# Patient Record
Sex: MALE | Race: WHITE
[De-identification: names, ages, dates, MRNs, and addresses within clinical notes are randomized per-mention and may not be internally consistent; named-entity substitution may affect disease eponyms.]

---

## 2022-02-19 ENCOUNTER — HOSPITAL ENCOUNTER (EMERGENCY)
Dept: HOSPITAL 97 - ER | Age: 37
Discharge: HOME | End: 2022-02-19
Payer: SELF-PAY

## 2022-02-19 VITALS — SYSTOLIC BLOOD PRESSURE: 136 MMHG | TEMPERATURE: 98.6 F | DIASTOLIC BLOOD PRESSURE: 89 MMHG | OXYGEN SATURATION: 100 %

## 2022-02-19 DIAGNOSIS — K02.7: ICD-10-CM

## 2022-02-19 DIAGNOSIS — F17.210: ICD-10-CM

## 2022-02-19 DIAGNOSIS — K05.00: Primary | ICD-10-CM

## 2022-02-19 PROCEDURE — 99283 EMERGENCY DEPT VISIT LOW MDM: CPT

## 2022-02-19 PROCEDURE — 96372 THER/PROPH/DIAG INJ SC/IM: CPT

## 2022-02-19 NOTE — ER
Nurse's Notes                                                                                     

 [facility]                                                                 

Name: Sohail Coughlin                                                                            

Age: 36 yrs                                                                                       

Sex: Male                                                                                         

: 1985                                                                                   

MRN: [id number]                                                                                   

Arrival Date: 2022                                                                          

Time: 09:49                                                                                       

Account#: [id number]                                                                            

Bed 15                                                                                            

Private MD:                                                                                       

Diagnosis: Acute gingivitis;Dental root caries                                                    

                                                                                                  

Presentation:                                                                                     

                                                                                             

10:02 Chief complaint: Patient states: C/O left bottom teeth pain x 3 days. Coronavirus       jl7 

      screen: At this time, the client does not indicate any symptoms associated with             

      coronavirus-19. Ebola Screen: No symptoms or risks identified at this time. Initial         

      Sepsis Screen: Does the patient meet any 2 criteria? No. Patient's initial sepsis           

      screen is negative. Does the patient have a suspected source of infection? No.              

      Patient's initial sepsis screen is negative. Risk Assessment: Do you want to hurt           

      yourself or someone else? Patient reports no desire to harm self or others. Onset of        

      symptoms was 2022.                                                             

10:02 Method Of Arrival: Ambulatory                                                           [facility] 

10:02 Acuity: ANUSHA 4                                                                           jl7 

                                                                                                  

Triage Assessment:                                                                                

10:06 General: Appears in no apparent distress. uncomfortable, Behavior is calm, cooperative, jl7 

      appropriate for age. Pain: Complains of pain in lower left third molar and lower left       

      cuspid. EENT: Reports pain in mouth.                                                        

                                                                                                  

Historical:                                                                                       

- Allergies:                                                                                      

10:06 No Known Allergies;                                                                     jl7 

- Home Meds:                                                                                      

10:06 None [Active];                                                                          jl7 

- PMHx:                                                                                           

10:06 None;                                                                                   jl7 

- PSHx:                                                                                           

10:06 None;                                                                                   jl7 

                                                                                                  

- Immunization history:: Client reports having NOT received the Covid vaccine.                    

- Social history:: Smoking status: Patient reports the use of cigarette tobacco                   

  products, smokes one-half pack cigarettes per day.                                              

- Family history:: not pertinent.                                                                 

- Hospitalizations: : No recent hospitalization is reported.                                      

                                                                                                  

                                                                                                  

Screening:                                                                                        

10:10 Abuse screen: Denies threats or abuse.                                                  6 

10:10 Nutritional screening: No deficits noted. Tuberculosis screening: No symptoms or risk   jh6 

      factors identified. Fall Risk None identified.                                              

                                                                                                  

Assessment:                                                                                       

10:10 General: Appears in no apparent distress. comfortable, Behavior is calm, cooperative.   jh6 

10:10 Pain: Complains of pain in right jaw Pain currently is 7 out of 10 on a pain scale.     jh6 

      Quality of pain is described as throbbing.                                                  

10:33 General: no reaction to med given. verbal understanding of dc instructions and meds. .  6 

                                                                                                  

Vital Signs:                                                                                      

10:02  / 100; Pulse 94; Resp 17; Temp 97.1(TE); Pulse Ox 98% on R/A; Weight 105.23 kg;  jl7 

      Height 6 ft. 1 in. (185.42 cm); Pain 9/10;                                                  

10:31  / 89; Pulse 86; Resp 17; Temp 98.6; Pulse Ox 100% ; Pain 7/10;                   jh6 

10:02 Body Mass Index 30.61 (105.23 kg, 185.42 cm)                                            7 

                                                                                                  

ED Course:                                                                                        

09:49 Patient arrived in ED.                                                                  as  

09:54 Conrado Allred MD is Attending Physician.                                                rn  

10:04 Marybeth Narayan, RN is Primary Nurse.                                                 6 

10:05 Triage completed.                                                                       7 

10:06 Arm band placed on right wrist.                                                         7 

10:06 Bed in low position. Call light in reach. Side rails up X 1.                            6 

10:32 No provider procedures requiring assistance completed. Patient did not have IV access   6 

      during this emergency room visit.                                                           

                                                                                                  

Administered Medications:                                                                         

10:08 Drug: Ketorolac 30 mg Route: IM; Site: right deltoid;                                   [facility] 

                                                                                                  

                                                                                                  

Outcome:                                                                                          

10:21 Discharge ordered by MD.                                                                rn  

10:32 Discharged to home ambulatory.                                                          6 

10:32 Condition: good                                                                             

10:32 Discharge instructions given to patient, Instructed on discharge instructions, follow       

      up and referral plans. Demonstrated understanding of instructions, follow-up care,          

      medications, Prescriptions given X 3.                                                       

10:36 Patient left the ED.                                                                    [facility] 

                                                                                                  

Signatures:                                                                                       

Nilda Escamilla                             as                                                   

Conrado Allred MD MD rn Leal, Jahala, RN RN   7                                                  

Marybeth Narayan RN                   RN   [facility]                                                  

                                                                                                  

**************************************************************************************************

## 2022-02-19 NOTE — EDPHYS
Physician Documentation                                                                           

 [facility]                                                                 

Name: Sohail Coughlin                                                                            

Age: 36 yrs                                                                                       

Sex: Male                                                                                         

: 1985                                                                                   

MRN: [id number]                                                                                   

Arrival Date: 2022                                                                          

Time: 09:49                                                                                       

Account#: [id number]                                                                            

Bed 15                                                                                            

Private MD:                                                                                       

ED Physician Conrado Allred                                                                         

HPI:                                                                                              

                                                                                             

10:04 This 36 yrs old Male presents to ER via [facility] with complaints of Toothache,        rn  

      Bleeding Gums.                                                                              

10:04 The patient presents with pain, redness. The problem is located in the Left upper and   rn  

      lower teeth. Onset: The symptoms/episode began/occurred 3 day(s) ago. Duration: The         

      symptoms are continuous. Modifying factors: The symptoms are alleviated by nothing, the     

      symptoms are aggravated by air, chewing, food. Severity of symptoms: At their worst the     

      symptoms were moderate, in the emergency department the symptoms are unchanged. The         

      patient has experienced similar episodes in the past, chronically. Reports has bad          

      teeth but worse over the last 3 days. Worse with air/fluid and food. Has not seen a         

      dentist. Has had this chronically but got worse recently. No difficulty swallowing or       

      breathing. No facial swelling.                                                              

                                                                                                  

Historical:                                                                                       

- Allergies:                                                                                      

10:06 No Known Allergies;                                                                     jl7 

- Home Meds:                                                                                      

10:06 None [Active];                                                                          jl7 

- PMHx:                                                                                           

10:06 None;                                                                                   jl7 

- PSHx:                                                                                           

10:06 None;                                                                                   jl7 

                                                                                                  

- Immunization history:: Client reports having NOT received the Covid vaccine.                    

- Social history:: Smoking status: Patient reports the use of cigarette tobacco                   

  products, smokes one-half pack cigarettes per day.                                              

- Family history:: not pertinent.                                                                 

- Hospitalizations: : No recent hospitalization is reported.                                      

                                                                                                  

                                                                                                  

ROS:                                                                                              

10:04 Constitutional: Negative for fever, chills, and weight loss, Eyes: Negative for injury, rn  

      pain, redness, and discharge, ENT: Positive for dental pain and caries                      

                                                                                                  

Exam:                                                                                             

10:20 Constitutional:  This is a well developed, well nourished patient who is awake, alert,  rn  

      and in no acute distress. ENT:  Poor dentition with inflamed gingiva, no sign of            

      intraoral abscess or drainage Neck:  Trachea midline, no masses palpated, and no            

      cervical lymphadenopathy.  Supple, full range of motion without nuchal rigidity, or         

      vertebral point tenderness.  No Meningismus.                                                

                                                                                                  

Vital Signs:                                                                                      

10:02  / 100; Pulse 94; Resp 17; Temp 97.1(TE); Pulse Ox 98% on R/A; Weight 105.23 kg;  jl7 

      Height 6 ft. 1 in. (185.42 cm); Pain 9/10;                                                  

10:31  / 89; Pulse 86; Resp 17; Temp 98.6; Pulse Ox 100% ; Pain 7/10;                   jh6 

10:02 Body Mass Index 30.61 (105.23 kg, 185.42 cm)                                            7 

                                                                                                  

MDM:                                                                                              

09:54 Patient medically screened.                                                             rn  

10:20 Differential diagnosis: dental caries, gingivitis, dental abscess. Data reviewed: vital rn  

      signs, nurses notes, and as a result, I will discharge patient. Counseling: I had a         

      detailed discussion with the patient and/or guardian regarding: the historical points,      

      exam findings, and any diagnostic results supporting the discharge/admit diagnosis, the     

      need for outpatient follow up, to return to the emergency department if symptoms worsen     

      or persist or if there are any questions or concerns that arise at home. Response to        

      treatment: the patient's symptoms have mildly improved after treatment, and as a            

      result, I will discharge patient. Special discussion: I discussed with the                  

      patient/guardian in detail that at this point there is no indication for admission to       

      the hospital. It is understood, however, that if the symptoms persist or worsen the         

      patient needs to return immediately for re-evaluation. Based on the history and exam        

      findings, there is no indication for further emergent testing or inpatient evaluation.      

      I discussed with the patient/guardian the need to see a dentist for further evaluation      

      of the symptoms.                                                                            

                                                                                                  

Administered Medications:                                                                         

10:08 Drug: Ketorolac 30 mg Route: IM; Site: right deltoid;                                   [facility] 

                                                                                                  

                                                                                                  

Disposition Summary:                                                                              

22 10:21                                                                                    

Discharge Ordered                                                                                 

      Location: Home                                                                          rn  

      Problem: chronic                                                                        rn  

      Symptoms: have improved                                                                 rn  

      Condition: Stable                                                                       rn  

      Diagnosis                                                                                   

        - Acute gingivitis                                                                    rn  

        - Dental root caries                                                                  rn  

      Followup:                                                                               rn  

        - With: Private Physician                                                                  

        - When: As needed                                                                          

        - Reason: Recheck today's complaints, Re-evaluation by your physician                      

      Discharge Instructions:                                                                     

        - Discharge Summary Sheet                                                             rn  

        - Dental Caries, Adult                                                                rn  

        - Dental Pain                                                                         rn  

      Forms:                                                                                      

        - Medication Reconciliation Form                                                      rn  

        - Thank You Letter                                                                    rn  

        - Antibiotic Education                                                                rn  

        - Prescription Opioid Use                                                             rn  

        - Work release form                                                                   eb  

      Prescriptions:                                                                              

        - Augmentin 875-125 mg Oral Tablet                                                         

            - take 1 tablet by ORAL route every 12 hours for 10 days; 20 tablet; Refills: 0,  rn  

      Product Selection Permitted                                                                 

        - Tramadol 50 mg Oral Tablet                                                               

            - take 1 tablet by ORAL route every 8 hours as needed; 12 tablet; Refills: 0,     rn  

      Product Selection Permitted                                                                 

        - Medrol (Kalin) 4 mg Oral Tablets, Dose Pack                                                

            - take 1 tablet by ORAL route as directed - follow package instructions; 1        rn  

      packet; Refills: 0, Product Selection Permitted                                             

Signatures:                                                                                       

Conrado Allred MD MD rn Leal, Jahala RN                        RN   jl7                                                  

Marybeth Narayan RN                   RN   jh6                                                  

                                                                                                  

**************************************************************************************************

## 2022-03-25 ENCOUNTER — HOSPITAL ENCOUNTER (EMERGENCY)
Dept: HOSPITAL 97 - ER | Age: 37
Discharge: HOME | End: 2022-03-25
Payer: SELF-PAY

## 2022-03-25 VITALS — TEMPERATURE: 98.1 F | SYSTOLIC BLOOD PRESSURE: 130 MMHG | OXYGEN SATURATION: 99 % | DIASTOLIC BLOOD PRESSURE: 107 MMHG

## 2022-03-25 DIAGNOSIS — F17.210: ICD-10-CM

## 2022-03-25 DIAGNOSIS — R10.32: Primary | ICD-10-CM

## 2022-03-25 DIAGNOSIS — Z04.89: ICD-10-CM

## 2022-03-25 DIAGNOSIS — Z87.442: ICD-10-CM

## 2022-03-25 DIAGNOSIS — R11.2: ICD-10-CM

## 2022-03-25 PROCEDURE — 99281 EMR DPT VST MAYX REQ PHY/QHP: CPT

## 2022-03-25 NOTE — EDPHYS
Physician Documentation                                                                           

 [facility]                                                                 

Name: Sohail Coughlin                                                                            

Age: 36 yrs                                                                                       

Sex: Male                                                                                         

: 1985                                                                                   

MRN: [id number]                                                                                   

Arrival Date: 2022                                                                          

Time: 10:01                                                                                       

Account#: [id number]                                                                            

Bed 20                                                                                            

Private MD:                                                                                       

ED Physician Conrado Allred                                                                         

HPI:                                                                                              

                                                                                             

10:15 This 36 yrs old Male presents to ER via Unassigned with complaints of Flank Pain,       cp  

      Possible Kidney Stone, Nausea/Vomiting.                                                     

10:15 The patient complains of pain in the left flank. The pain does not radiate. Onset: The  cp  

      symptoms/episode began/occurred this morning. Associated signs and symptoms: Pertinent      

      positives: nausea, Pertinent negatives: fever, pain radiating to the lower extremities,     

      vomiting. The patient has experienced similar episodes in the past, today's symptoms        

      are similar, to when the patient was apparently diagnosed with kidney stones. Patient       

      declines any testing at this time. Reports history of kidney stones in the past and         

      pain is similar. Requesting note for work due to missing work today.                        

                                                                                                  

Historical:                                                                                       

- Allergies:                                                                                      

10:21 No Known Allergies;                                                                     ph  

- PMHx:                                                                                           

10:21 kidney stones;                                                                          ph  

                                                                                                  

- Immunization history:: Adult Immunizations unknown.                                             

- Social history:: Smoking status: Patient reports the use of cigarette tobacco                   

  products, smokes one pack cigarettes per day.                                                   

                                                                                                  

                                                                                                  

ROS:                                                                                              

10:17 Constitutional: Negative for body aches, chills, fever, poor PO intake.                 cp  

10:17 Cardiovascular: Negative for chest pain.                                                    

10:17 Respiratory: Negative for cough, shortness of breath, wheezing.                             

10:17 Abdomen/GI: Positive for nausea, Negative for vomiting, diarrhea, constipation.             

10:17 Back: Positive for flank pain, on the left.                                                 

10:17 : Negative for difficulty urinating, testicular pain                                      

10:17 Neuro: Negative for headache, weakness.                                                     

10:17 All other systems are negative.                                                             

                                                                                                  

Exam:                                                                                             

10:18 Head/Face:  Normocephalic, atraumatic.                                                  cp  

10:18 Constitutional: The patient appears in no acute distress, alert, awake, non-toxic, well     

      developed, well nourished.                                                                  

10:18 Eyes: Periorbital structures: appear normal, Conjunctiva: normal, no exudate, no            

      injection, Lids and lashes: appear normal, bilaterally.                                     

10:18 ENT: External ear(s): are unremarkable, Nose: is normal, Mouth: Lips: moist, Oral           

      mucosa: moist, Posterior pharynx: Airway: no evidence of obstruction, patent.               

10:18 Chest/axilla: Inspection: normal.                                                           

10:18 Respiratory: the patient does not display signs of respiratory distress,  Respirations:     

      normal, no use of accessory muscles, no retractions, labored breathing, is not present,     

      Breath sounds: are clear throughout, no decreased breath sounds.                            

10:18 Abdomen/GI: Exam negative for discomfort, distension, guarding, Inspection: abdomen         

      appears normal.                                                                             

10:18 Back: CVA tenderness, is absent.                                                            

10:18 Neuro: Orientation: to person, place \T\ time. Mentation: is normal, Motor: moves all       

      fours, strength is normal.                                                                  

10:20 Cardiovascular: Rate: normal.                                                           cp  

                                                                                                  

Vital Signs:                                                                                      

10:17  / 107; Pulse 93; Resp 18; Temp 98.1; Pulse Ox 99% on R/A; Weight 105.23 kg;      ph  

      Height 6 ft. 2 in. (187.96 cm); Pain 7/10;                                                  

10:17 Body Mass Index 29.79 (105.23 kg, 187.96 cm)                                            ph  

                                                                                                  

MDM:                                                                                              

10:07 Patient medically screened.                                                             cp  

10:20 Data reviewed: vital signs, nurses notes, and as a result, I will discharge patient.    cp  

10:21 Differential diagnosis: nephrolithiasis, pyelonephritis, UTI. Counseling: I had a       cp  

      detailed discussion with the patient and/or guardian regarding: the historical points,      

      exam findings, and any diagnostic results supporting the discharge/admit diagnosis, the     

      presence of at least one elevated blood pressure reading (>120/80) during this              

      emergency department visit, to return to the emergency department if symptoms worsen or     

      persist or if there are any questions or concerns that arise at home.                       

                                                                                                  

Administered Medications:                                                                         

No medications were administered                                                                  

                                                                                                  

                                                                                                  

Disposition:                                                                                      

10:38 Co-signature as Attending Physician, Conrado Allred MD.                                    rn  

                                                                                                  

Disposition Summary:                                                                              

22 10:23                                                                                    

Discharge Ordered                                                                                 

      Location: Home                                                                          cp  

      Problem: new                                                                            cp  

      Symptoms: are unchanged                                                                 cp  

      Condition: Stable                                                                       cp  

      Diagnosis                                                                                   

        - Encounter for examination and observation for other specified reasons - work excuse cp  

      Followup:                                                                               cp  

        - With: Private Physician                                                                  

        - When: 1 - 2 days                                                                         

        - Reason: Worsening of condition                                                           

      Discharge Instructions:                                                                     

        - Discharge Summary Sheet                                                             cp  

        - Form - Excuse from Work, School, or Physical Activity                               cp  

      Forms:                                                                                      

        - Medication Reconciliation Form                                                      cp  

        - Thank You Letter                                                                    cp  

        - Antibiotic Education                                                                cp  

        - Work release form                                                                   ph  

        - Prescription Opioid Use                                                             cp  

Signatures:                                                                                       

Conrado Allred MD MD   rn                                                   

Kiana Schmitt RN                      RN   ph                                                   

Will Sim PA PA   cp                                                   

                                                                                                  

**************************************************************************************************

## 2022-03-25 NOTE — XMS REPORT
Continuity of Care Document

                            Created on:2022



Patient:KILO MELISSA

Sex:Male

:1985

External Reference #:[id number]





Demographics







                          Address                   [address]



                                                    [place] [address]

 

                          Home Phone                [phone]

 

                          Work Phone                [phone]

 

                          Email Address             NONE

 

                          Preferred Language        English

 

                          Marital Status            Unknown

 

                          Anabaptist Affiliation     Unknown

 

                          Race                      Unknown

 

                          Ethnic Group              Unknown









Author







                          Organization              [facility]

t

 

                          Address                   [address]



                                                    [place] [address]

 

                          Phone                     [phone]









Support







                Name            Relationship    Address         Phone

 

                SIRISHA          Unavailable     N/A             [phone]



                                                [place] [address] 

 

                SIRISHA          Unavailable     N/A             [phone]



                                                [place] [address] 

 

                OTHER           Unavailable     N/A             [phone]



                                                [place] [address] 

 

                BHATTROMARIO  Unavailable     [address] [facility]   [phone]



                                                [place] [address] 

 

                SIRISHA          Unavailable     [facility]             [phone]



                                                [place] [address] 









Care Team Providers







                    Name                Role                Phone

 

                    DAVID Das          Attending Clinician Unavailable

 

                    GORGE Wilson            Attending Clinician Unavailable

 

                    Physician,  Primary or Family Admitting Clinician Unavailabl

e









Payers







           Payer Name Policy Type Policy Number Effective Date Expiration Date S

ource







Problems

This patient has no known problems.



Allergies, Adverse Reactions, Alerts







       Allergy Allergy Status Severity Reaction(s) Onset  Inactive Treating Comm

ents 

Source



       Name   Type                        Date   Date   Clinician        

 

       adhesive DA     Active U      SWELLING -0                      [facility]



                                          4-17                        [facility]



                                          00:00:                      e



                                          00                          Medical



                                                                      Center

 

       adhesive DA     Active U             -0                      [facility]



                                          4-17                        [facility]



                                          00:00:                      e



                                          00                          Medical



                                                                      Center

 

       adhesive DA     Active U             2020-0                      [facility]



                                          3-10                        [facility]



                                          00:00:                      e



                                          00                          Medical



                                                                      Center

 

       adhesive DA     Active U      SWELLING 2020-0                      [facility]



                                          3-10                        [facility]



                                          00:00:                      e



                                          00                          Medical



                                                                      Center

 

       adhesive DA     Active U             2019-0                      [facility]



                                          8-22                        [place]



                                          00:00:                      [place]



                                          00                          [facility]

 

       adhesive DA     Active U      SWELLING -0                      [facility]



                                          8-22                        [facility]



                                          00:00:                      [place]



                                          00                          [facility]

 

       adhesive DA     Active U             -                      [facility]



                                          2-09                        [facility]



                                          00:00:                      [place]



                                          00                          [facility]







Medications

This patient has no known medications.



Procedures

This patient has no known procedures.



Encounters







        Start   End     Encounter Admission Attending Care    Care    Encounter 

Source



        Date/Time Date/Time Type    Type    Clinicians Facility Department ID   

   

 

        2021-05-15         Inpatient                 [facility]   [facility]    [id number] 

[facility]



        07:34:00                                                 [id number]  [facility]

 

        2021         Inpatient                 [facility]   [facility]    [id number] 

[facility]



        11:22:00                                                 [id number]  [facility]

 

        2021         Inpatient                 [facility]   [facility]    [id number] 

[facility]



        11:36:00                                                 [id number]  [facility]

 

        2021         Inpatient                 [facility]   [facility]    [id number] 

[facility]



        14:09:00                                                 [id number]  [facility]

 

        2021         Inpatient                 [facility]   [facility]    [id number] 

[facility]



        16:15:00                                                 [id number]  [facility]

 

        2020         Inpatient                 [facility]   [facility]    [id number] 

[facility]



        15:05:00                                                   [facility]

 

        2020         Inpatient                 [facility]   IRINA    [id number] 

[facility]



        09:31:00                                                 2008  [facility]

 

        2020-03-10         Inpatient                 [facility]   [facility]    [id number] 

[facility]



        12:54:00                                                   [facility]

 

        2020         Inpatient                 [facility]   [facility]    [id number] 

[facility]



        19:29:00                                                   [facility]

 

        2021 Emergency EM      Pippa [facility]   [facility]    [id number]

[id number] [facility]



        14:21:00 16:20:00                 Tommie                 [id number]  [facility]

 

        2021 Emergency EM      Katie   [facility]   [facility]    [id number]

[id number] [facility]



        18:50:00 20:01:00                 Gissel                  [id number]  [facility]







Results







           Test Description Test Time  Test Comments Results    Result     [facility]

e



                                                       Comments   

 

           - XR ANKLE 3 + V 2021            *********************         

   



           LT         15:48:00              *********************            



                                            ******************[facility]             



                                            ([facility])Name:            



                                            WORKIZERTHOM            



                                                : 1985            



                                                 Sex:             



                                            M********************            



                                            *********************            



                                            *******************            



                                            Name:                 



                                            WORKIZERTHOM            



                                            Imaging [facility]    :            



                                            1985 Age/S:36            



                                            /M            [address]            



                                            [facility]            



                                                 Unit#:[id number]            



                                                Loc: [place] [address]            



                                                          Phys:            



                                            Amauri Avalos NP            



                                                                  



                                                                  



                                             Acct#: [id number]            



                                            Dis Date:             



                                                  PHONE #:            



                                            [phone]            



                                            Status: PRE ER            



                                                                  



                                                 FAX #:            



                                            [phone]            



                                            Exam Date: 2021            



                                                      Reason:            



                                            LEFT ANKLE PAIN            



                                                                  



                                                    EXAMS:            



                                                                  



                                                             CPT            



                                            CODE:      [id number]            



                                            XR ANKLE 3 + V LT            



                                                                  



                                            [id number]                 



                                               HISTORY: Left            



                                            ankle pain.            



                                                COMPARISON: X-ray            



                                            from 2019.            



                                                                  



                                            Location: formerly [facility].            



                                                   No acute            



                                            fracture or            



                                            dislocation. Old            



                                            fracture deformity of            



                                            the medial            



                                            malleolus with            



                                            nonunion. Marginal            



                                            osteophytes from the            



                                            talus along its            



                                            lateral inferior            



                                            margin as well is            



                                            unchanged. Prominent            



                                            posterior       talar            



                                            process. Achilles and            



                                            plantar calcaneal            



                                            enthesophytes. No            



                                              osteochondral            



                                            defects of the talus.            



                                            Tibial plafond is            



                                            unremarkable. No            



                                             soft tissue            



                                            swelling. No joint            



                                            fluid.                



                                             IMPRESSION:            



                                                     No acute            



                                            fracture or            



                                            dislocation. DJD.            



                                                 **               



                                            Electronically Signed            



                                            by JED Cleary            



                                            on 2021 at 1548            



                                            **                    



                                              Reported and signed            



                                            by: Rc Cleary M.D.                  



                                                  CC:             



                                            Tommie Das MD; Amauri Avalos NP            



                                                                  



                                                                  



                                                                  



                                                                  



                                                Technologist:            



                                            WILFREDO ENRIQUE(R),RDMS,CT            



                                                                  



                                            Trnscrpt Data:            



                                            2021 (1548)            



                                            [id number]             



                                                         Orig            



                                            Print D/T: S:            



                                            2021 (7616)            



                                                                  



                                            PAGE  1               



                                                    Signed Report            



                                                                  



                                                                  

 

           - XR FOOT 3 + V RT 2021            *********************       

     



                      19:50:00              *********************            



                                            ******************[facility]             



                                            ([facility])Name:            



                                            THOM MELISSA            



                                                : 1985            



                                                 Sex:             



                                            M********************            



                                            *********************            



                                            *******************            



                                            Name:                 



                                            THOM MELISSAwood            



                                            Imaging [facility]    :            



                                            1985 Age/S:36            



                                            /M            [address]            



                                            [facility]            



                                                 Unit#:[id number]            



                                                Loc: [place] [address]            



                                                          Phys:            



                                            Lady Limon  NP            



                                                                  



                                                                  



                                             Acct#: [id number]            



                                            Dis Date:             



                                                  PHONE #:            



                                            [phone]            



                                            Status: REG ER            



                                                                  



                                                 FAX #:            



                                            [phone]            



                                            Exam Date: 2021            



                                                      Reason:            



                                            pain                  



                                                                  



                                                    EXAMS:            



                                                                  



                                                             CPT            



                                            CODE:      [id number]            



                                            XR FOOT 3 + V RT            



                                                                  



                                            [id number]                 



                                                                  



                                                 REASON FOR EXAM:            



                                            pain adn swelling            



                                                       EXAM ORDER            



                                            DATE: 2021 7:09            



                                            PM                    



                                            Ordering: Lady Limon NP            



                                            Location:formerly [facility]            



                                                 PROCEDURE:  - XR            



                                            ANKLE 3 + V RT,  - XR            



                                            FOOT 3 + V RT            



                                                  FINDINGS: 3            



                                            views of the right            



                                            ankle and 3 views of            



                                            the right foot            



                                            were obtained. The            



                                            osseous structures            



                                            are unremarkable in            



                                            size and       shape.            



                                            The joint spaces are            



                                            maintained. No            



                                            evidence of fracture.            



                                             The                  



                                            syndesmosis is            



                                            intact.               



                                                IMPRESSION: No            



                                            acute abnormalities.            



                                                    **            



                                            Electronically Signed            



                                            by Gino De La Rosa M.D.            



                                            on 2021 at 1950            



                                            **                    



                                              Reported and signed            



                                            by: Gino De La Rosa M.D.            



                                                                  



                                            CC: Lady Limon NP                    



                                                                  



                                                                  



                                                                  



                                                                  



                                                    Technologist:            



                                            WILFREDO ENRIQUE            



                                            RT(R),RDMS,CT            



                                                                  



                                            Trnscrpt Data:            



                                            2021 ()            



                                            [email or web address]            



                                                         Orig            



                                            Print D/T: S:            



                                            2021 ()            



                                                                  



                                            PAGE  1               



                                                    Signed Report            



                                                                  



                                                                  

 

           - XR ANKLE 3 + V 2021            *********************         

   



           RT         19:50:00              *********************            



                                            ******************[facility]             



                                            ([facility])Name:            



                                            THOM MELISSA            



                                                : 1985            



                                                 Sex:             



                                            M********************            



                                            *********************            



                                            *******************            



                                            Name:                 



                                            THOM MELISSAwood            



                                            Imaging [facility]    :            



                                            1985 Age/S:36            



                                            /M            [address]            



                                            [facility]            



                                                 Unit#:[id number]            



                                                Loc: [place]            



                                               [place] [address]            



                                                          Phys:            



                                            Lady Limon NP            



                                                                  



                                                                  



                                             Acct#: [id number]            



                                            Dis Date:             



                                                  PHONE #:            



                                            [phone]            



                                            Status: REG ER            



                                                                  



                                                 FAX #:            



                                            [phone]            



                                            Exam Date: 2021            



                                                      Reason:            



                                            pain adn swelling            



                                                                  



                                                    EXAMS:            



                                                                  



                                                             CPT            



                                            CODE:      [id number]            



                                            XR ANKLE 3 + V RT            



                                                                  



                                            [id number]                 



                                                                  



                                                 REASON FOR EXAM:            



                                            pain adn swelling            



                                                       EXAM ORDER            



                                            DATE: 2021 7:09            



                                            PM                    



                                            Ordering: Lady Limon NP            



                                            Location:formerly [facility]            



                                                 PROCEDURE:  - XR            



                                            ANKLE 3 + V RT,  - XR            



                                            FOOT 3 + V RT            



                                                  FINDINGS: 3            



                                            views of the right            



                                            ankle and 3 views of            



                                            the right foot            



                                            were obtained. The            



                                            osseous structures            



                                            are unremarkable in            



                                            size and       shape.            



                                            The joint spaces are            



                                            maintained. No            



                                            evidence of fracture.            



                                             The                  



                                            syndesmosis is            



                                            intact.               



                                                IMPRESSION: No            



                                            acute abnormalities.            



                                                    **            



                                            Electronically Signed            



                                            by Gino De La Rosa M.D.            



                                            on 2021 at             



                                            **                    



                                              Reported and signed            



                                            by: Gino De La Rosa M.D.            



                                                                  



                                            CC: Lady Limon NP                    



                                                                  



                                                                  



                                                                  



                                                                  



                                                    Technologist:            



                                            WILFREDO ENRIQUE            



                                            RT(R),RDMS,CT            



                                                                  



                                            Trnscrpt Data:            



                                            2021 ()            



                                            [id number]            



                                                         Orig            



                                            Print D/T: S:            



                                            2021 ()            



                                                                  



                                            PAGE  1               



                                                    Signed Report            



                                                                  



                                                                  









                    STREPTOCOCCUS PCR SCREEN 2021 04:00:00 









                      Test Item  Value      Reference Range Interpretation Comme

nts









             STREPTOCOCCUS DYSGALACTIAE (test code = STREPGC) NEGATIVE FOR G/C N

EGATIVE                  

 

             STREPA MOLECULAR (test code = STREPAMOL) NEGATIVE FOR GRP A NEGATIV

E                  



[id number] 09:03:00





             Test Item    Value        Reference Range Interpretation Comments

 

             D-DIMER (test code = DDIMER) < 100 ng/ml  < 600                    

 



COMPREHENSIVE METABOLIC PANEL2021-05-15 09:01:00





             Test Item    Value        Reference Range Interpretation Comments

 

             SODIUM (test code = 135 mmol/L   136-145      L            



             NA)                                                 

 

             POTASSIUM (test code = 4.1 mmol/L   3.5-5.1      N            



             K)                                                  

 

             CHLORIDE (test code = 100 mmol/L   101-109      L            



             CL)                                                 

 

             CARBON DIOXIDE (test 23.7 mmol/L  21-32        N            



             code = CO2)                                         

 

             ANION GAP (test code = 15 mmol/L    10-20        N            



             GAP)                                                

 

             GLUCOSE (test code = 217 mg/dL           H            



             GLU)                                                

 

             BLOOD UREA NITROGEN 5 mg/dL      3-21         N            



             (test code = BUN)                                        

 

             CREATININE (test code 1.00 mg/dL   0.55-1.3     N            



             = CREAT)                                            

 

             BUN/CREATININE RATIO 5.0          10-20        L            



             (test code = BUN/CREA)                                        

 

             TOTAL PROTEIN (test 7.5 g/dL     6.5-8.4      N            



             code = PROT)                                        

 

             ALBUMIN (test code = 4.0 g/dL     3.4-4.8      N            



             ALB)                                                

 

             GLOBULIN (test code = 3.5 G/DL     1-10         N            



             GLOB)                                               

 

             ALBUMIN/GLOBULIN RATIO 1.14 RATIO   0.75-1.50    N            



             (test code = A/G)                                        

 

             CALCIUM (test code = 8.8 mg/dL    8.4-10.2     N            



             CA)                                                 

 

             BILIRUBIN TOTAL (test 0.40 mg/dL   0.0-1.0      N            



             code = BILT)                                        

 

             SGOT/AST (test code = 22 U/L       6-32         N            



             AST)                                                

 

             SGPT/ALT (test code = 37 U/L       12-78        N            Note: 

Change in



             ALT)                                                REFERENCE RANGE

 due



                                                                 to new reagent



                                                                 method.

 

             ALKALINE PHOSPHATASE 83 U/L              N            



             TOTAL (test code =                                        



             ALKP)                                               



EXGRNELK--70-15 09:01:00





             Test Item    Value        Reference Range Interpretation Comments

 

             TROPONIN-I (test code = TROPI) <0.015 ng/mL 0.00-0.056   N         

   



COVID 19 INHOUSE AG2021-05-15 08:59:00





             Test Item    Value        Reference Range Interpretation Comments

 

             COVID 19 INHOUSE AG (test code = NEGATIVE     NEGATIVE             

     



             [id number])                                        



COMPREHENSIVE METABOLIC PANEL2021-05-15 08:52:00





             Test Item    Value        Reference Range Interpretation Comments

 

             SODIUM (test code = NA) 135 mmol/L   136-145      L            

 

             POTASSIUM (test code = K) 4.1 mmol/L   3.5-5.1      N            

 

             CHLORIDE (test code = CL) 100 mmol/L   101-109      L            

 

             CARBON DIOXIDE (test code = CO2) 23.7 mmol/L  21-32        N       

     

 

             ANION GAP (test code = GAP) 15 mmol/L    10-20        N            

 

             GLUCOSE (test code = GLU) 217 mg/dL           H            

 

             BLOOD UREA NITROGEN (test code = 5 mg/dL      3-21         N       

     



             BUN)                                                

 

             CREATININE (test code = CREAT) 1.00 mg/dL   0.55-1.3     N         

   

 

             BUN/CREATININE RATIO (test code = 5.0          10-20        L      

      



             BUN/CREA)                                           

 

             TOTAL PROTEIN (test code = PROT)  gram/dL     6.4-8.2              

     

 

             ALBUMIN (test code = ALB)  g/dL        3.4-5.0                   

 

             GLOBULIN (test code = GLOB)  g/dL        2.7-4.2                   

 

             ALBUMIN/GLOBULIN RATIO (test code              0.75-1.50           

      



             = A/G)                                              

 

             CALCIUM (test code = CA) 8.8 mg/dL    8.4-10.2     N            

 

             BILIRUBIN TOTAL (test code =  mg/dL       0.2-1.2                  

 



             BILT)                                               

 

             SGOT/AST (test code = AST)  IUnit/L     15-37                     

 

             SGPT/ALT (test code = ALT)  U/L         10-69                     

 

             ALKALINE PHOSPHATASE TOTAL (test  IUnit/L                    

     



             code = ALKP)                                        



[id number]--[id number] 08:52:00





             Test Item    Value        Reference Range Interpretation Comments

 

             TROPONIN-I (test code = TROPI)  ng/mL       0-0.045                

   



CBC W/AUTO DIFF2021-05-15 08:41:00





             Test Item    Value        Reference Range Interpretation Comments

 

             WHITE BLOOD CELL (test code = 7.3 K/mm3    4.5-12.5     N          

  



             WBC)                                                

 

             RED BLOOD CELL (test code = 5.05 mill/mm3 4.0-5.8      N           

 



             RBC)                                                

 

             HEMOGLOBIN (test code = HGB) 15.3 gram/dL 13.0-17.5    N           

 

 

             HEMATOCRIT (test code = HCT) 45.2 %       42.0-52.0    N           

 

 

             MEAN CELL VOLUME (test code = 89.5 fL      80-98        N          

  



             MCV)                                                

 

             MEAN CELL HGB (test code = MCH) 30.3 picogram 27.0-33.0    N       

     

 

             MEAN CELL HGB CONCETRATION 33.8 gram/dL 33.0-36.0    N            



             (test code = MCHC)                                        

 

             RED CELL DISTRIBUTION WIDTH 11.6 %       11.6-16.2    N            



             (test code = RDW)                                        

 

             RED CELL DISTRIBUTION WIDTH SD 38.8 fL      37.0-51.0    N         

   



             (test code = RDW-SD)                                        

 

             PLATELET COUNT (test code = 192 K/mm3    150-450      N            



             PLT)                                                

 

             MEAN PLATELET VOLUME (test code 10.2 fL      6.7-11.0     N        

    



             = MPV)                                              

 

             NEUTROPHIL % (test code = NT%) 53.6 %       39.0-69.0    N         

   

 

             LYMPHOCYTE % (test code = LY%) 36.0 %       25.0-55.0    N         

   

 

             MONOCYTE % (test code = MO%) 5.5 %        0.0-10.0     N           

 

 

             EOSINOPHIL % (test code = EO%) 3.4 %        0.0-5.0      N         

   

 

             BASOPHIL % (test code = BA%) 1.0 %        0.0-1.0      N           

 

 

             NEUTROPHIL # (test code = NT#) 3.93 K/mm3   1.8-7.7      N         

   

 

             LYMPHOCYTE # (test code = LY#) 2.64 K/mm3   1.0-5.0      N         

   

 

             MONOCYTE # (test code = MO#) 0.40 K/mm3   0-0.8        N           

 

 

             EOSINOPHIL # (test code = EO#) 0.25 K/mm3   0.0-0.5      N         

   

 

             BASOPHIL # (test code = BA#) 0.07 K/mm3   0.0-0.2      N           

 

 

             MANUAL DIFF REQUIRED (test code NO                                 

    



             = MDIFF)                                            



- XR CHEST 2 -05-15 08:13:00
************************************************************[facility])Name: WORKIZERTHOM         : 1985  
     Sex: M************************************************************  Name: 
THOM MELISSA [facility]    : 
1985 Age/S:35  /M            [address]           
Unit#:[id number]     Loc: [place] [address]               Phys:
Tommie Das MD                Acct#: [id number] Dis Date:             
     PHONE #: [phone]      Status: PRE ER                                  
FAX #: [phone]      Exam Date: 05/15/2021           Reason: SOB            
                                    EXAMS:       CPT CODE:      [id number] XR 
CHEST 2 V                               [id number]       REASON FOR EXAM: SOB        
      Exam Order Date: 5/15/2021 7:50 AM               Ordering:Tommie Das MD       Attending:Tommie Das MD       Location:formerly [facility]            
  PROCEDURE:  - XR CHEST 2 V               COMPARISON: 2021               
FINDINGS:  PA and lateral views of the chest show patchy airspace       opacity 
of the bases. No evidence of effusion. The heart size is       within normal 
limits. Pulmonary vasculatures are unremarkable. The       osseous structures 
are grossly intact.                 IMPRESSION: Atelectasis of the bases        
 ** Electronically Signed by JED Moreno on 05/15/2021 at 0813 **              
       Reported and signed by: Girish Moreno M.D.                     CC: 
Tommie Das MD                                                          
        Technologist: Alfreda Muro                                   Trnscrpt 
Data: 05/15/2021 (813) [email or web address].VTL  Orig Print D/T: S: 05/15/2021 (0817)         
               PAGE  1                       Signed Report- XR RIBS UNI W/CXR 
3+V [id number] 13:25:00
************************************************************[facility]         : 1985        Sex:
M************************************************************                   
                               [place]:   St: REG-------------------
------------------------------------------------------------  Name:   
WORKIZERTHOM [place]                    : 1985  Age/S: 
35/M           [address] [place] [address]       Unit #: [id number]      Loc: [place] [address]                 Phys: Norberto Fortune MD                
                                 Acct: [id number] Dis Date:               
atus: REG ER                                 PHONE #: [phone]     Exam 
Date:                        FAX #: [phone]     Reason: pain 
s/p fall     EXAMS:                                               CPT CODE:     
[id number] XR RIBS UNI W/CXR3+V LT                   55330                    
Procedure: Left Rib Series.               ClinicalIndication: Left rib pain post
fall.               Comparison: Chest radiograph 2020.    FINDINGS:        
       The AP and oblique views of the left ribs, 5 views, show no definite   
rib fractures. The costovertebral junctions are unremarkable.               
There are no associated pleural effusions or pneumothorax. There are       no 
underlying pulmonary contusions noted.          IMPRESSION:         1. 
Unremarkable exam.                                       SL: OCO-H              
                                ** Electronically Signed by JED Dalton **         **              on 2021 at 1325             **        
             Reported and signed by: Eitan Dalton M.D.                CC:  
                                                                                
Technologist: RT Jackie(R); RT Gerard(R)          Mariah 
Date/Time/By: 2021 (1254) : By: Fatou           Orig Print D/T: S: 
2021 (7177)                         PAGE  1      Signed Report- CT 
HEAD/BRAIN W/O [id number] 14:40:00
************************************************************[facility] ([facility])Name: THOM MELISSA         : 1985  
     Sex: M************************************************************  Name: 
THOM MELISSA                  [facility]     : 
1985 Age/S: 35  / M         [address]           Unit #: 
[id number]     Loc:          [place] [address]                Phys: Elver Sandhu DO                 Acct: [id number]  Dis Date:               Status: PRE ER
       PHONE #: [phone]     Exam Date: 2021  1428                    
FAX #: [phone]      Reason: blunt trauma                                   
    EXAMS:                  CPT CODE:      [id number] CT HEAD/BRAIN W/O CONT     
               [address]          HISTORY:  blunt trauma               TECHNIQUE: 
Noncontrast 2.5 mm axial CT of the head. Examination       acquired within 24 
hours of arrival. Automated exposure control for       dose reduction.          
    COMPARISON: CT scan of the brain 2019               FINDINGS:    
No lacerations or contusions of the scalp or facial soft tissues.        
Calvarium and skull base are intact.               No acute hemorrhage. No 
intracranial mass, mass effect, or midlineshift. No effacement of the sulci or 
grey-white matter interface.                No cortical atrophy.  No signs of 
white matter small-vessel disease.               No hydrocephalus.. No extra-
axial fluid collection.                Mucosal thickening in the ethmoid 
sinuses. Remaining paranasal sinuses     appear clear.        Postsurgical 
changes of mastoidectomy on the right side. Partial       opacification of the 
left mastoid air cells appear similar to the       prior exam.       Orbital 
contents are unremarkable.                          IMPRESSION:                 
 Negative CT head.          Location:           ** Electronically Signed by 
Mariano Milton MD on 2021 at 1440 **                    Reported and signed 
by: Mariano Milton MD      CC: Elver Sandhu DO  Technologist:Alfreda Muro          
          CTDI:        DLP:        Trnscb Date/Time: 2021(7060) [id number]
                      Orig Print D/T: S: 2021 (2805)      PAGE  1         
 Signed [id number] 13:33:00





             Test Item    Value        Reference Range Interpretation Comments

 

             GLUBED (test code = 313 mg/dL           H            Performe

d by certified



             GLUBED)                                              at [facility]



- XR CHEST 1 -32-68 11:32:00 FAX:         Calli Pacheco NP               
   [place]:    St: REG-------------------------------
------------------------------------------------  Name:   CLARAIZERTHOM      
          [facility]                     : 1985  Age/S: 35/M       
   [address] [facility] [facility]                Unit#: [id number]      Loc: [place]  [address]              Phys: Calli Pacheco NP                      
                     Acct: [id number] Dis Date:               Status: REG ER  
                              PHONE #: [phone]     Exam Date:     
2020     1115           FAX #: [phone]     Reason: SHORTNESS OF 
BREATH                                EXAMS:                                    
         CPT CODE:      [id number] XR CHEST 1 V             [address]83                
   HISTORY: Shortness of breath.               COMPARISON: Chest x-ray from 
2020.               Location: formerly [facility].               No acute 
infiltrates, effusion or congestion is noted. Suboptimal       inspiration with 
dependent changes. Mild cardiomegaly.             IMPRESSION:                   
No acute infiltrates, effusion or congestion.          ** Electronically Signed 
by JED Cleary on 2020 at 1132 **                      Reported and 
signed by: Rc Cleary M.D.                            CC: Calli Pacheco NP 
                                                                                
               Technologist: DENISA WORKMAN, RT(R)                        
   Trnscrd Date/Time/By: 2020 (3361) : By: [email or web address]           Orig Print 
D/T: S: 2020 (1131)                         PAGE  1                     
Signed Report- CT ABD PELVIS [id number] 11:23:00  Name: THOM MELISSA [facility]                     : 1985 Age/S: 35  / M
        [address]                Unit #: [id number]     Loc:             
 [place]  [address]              Phys: Calli Pacheco  NP                     
                            Acct: [id number]  Dis Date:               Status: 
REG ER                                  PHONE #: [phone]     Exam Date: 
2020  1111                     FAX #: [phone]      Reason: abdominal 
pain                                      EXAMS:                                
              CPTCODE:      [id number] CT ABD PELVIS W/CONT                      
[address]                    HISTORY: Abdominal pain.               COMPARISON: 
2019 and 2019.               Location: formerly [facility].               CT
abdomen and pelvis with IV contrast: 100 mL of Isovue-370.       Automated 
exposure control.               CT of abdomen:               The lung bases are 
clear.               Hepatic parenchyma enhances homogeneously. No parenchymal 
mass or       nodules. Mild fatty infiltration. Portal vein and hepatic artery 
are       patent. Gallbladder is without radiopaque stones. The liver is       
measuring 25.4 cm in length.               The spleen is unremarkable. The 
stomach distended incompletely with       food however it is normal in 
appearance.               Pancreas is enhancing homogeneously. Unremarkable 
adrenals.               Kidneys are free from hydroureteronephrosis. Homogeneous
enhancement.       Bilateral excretion.               No pathologic adenopathy. 
Well-opacified abdominal and pelvic       vasculature.               No bowel 
obstruction. No diverticulitis. Circumferential wall       thickening of the 
left colon and to lesser extent on the right side       suggestive of colitis. 
No enteritis with normal small bowel loops.               CT PELVIS:Appendix is 
normal. Pelvic bowel loops are unobstructed. Thickened       right and left 
colonic walland the rectal wall. These findings       suggesting acute colitis. 
No free fluid or free air or abscess. Small       bowel loops are unremarkable. 
             Unremarkable incompletely distended urinary bladder. The prostate 
is       not enlarged. No pelvic pathologic adenopathy.               Subcu
taneous tissues and musculature are unremarkable. No lytic or       blastic 
lesions are noted withinthe bony skeleton.     PAGE  1                       
Signed Report                    (CONTINUED)   Name: THOM MELISSA [facility]                     : 1985 Age/S: 35/ M         
[address]                Unit #: [id number]     Loc:               
[place]  [id number]              Phys: Junior,Calli  NP                        
                         Acct: [id number]  Dis Date:               Status: REG
ER                                  PHONE #: [phone]     Exam Date: 
2020  1111                     FAX #: [phone]      Reason: abdominal 
pain                                      EXAMS:                                
              CPT CODE:      [id number] CT ABD PELVIS W/CONT                     
 [address]               &lt;Continued&gt;               IMPRESSION:                
  Mild circumferential wall thickening of the left colon and to lesser         
extent the right colon and rectum suggestive of segmental colitis.         Mild
inflammation. No free fluid, free air or abscess. Normal         appendix.      
            Fibrofatty infiltrated liver with hepatomegaly. No pathologic       
 adenopathy. No hydroureteronephrosis with unremarkable incompletely         
distended urinary bladder.          ** Electronically Signed byJED Cleary
on 2020 at 1123 **                      Reported and signed by: Rc Cleary M.D.                           CC: Calli Pacheco NP                   
                                                   Technologist:Ar Washington 
RT(R),(MR),(CT)   CTDI:        DLP:        Trnscb Date/Time: 2020 (1123) 
t.SDR.TH4        Orig Print D/T: S: 2020 (1123)      PAGE  2              
        Signed ReportB-TYPE NATRIURETIC [id number] 11:04:00





             Test Item    Value        Reference Range Interpretation Comments

 

             B-TYPE NATRIURETIC PEPTIDE 3.56 pgram/mL 0-100        N            



             (test code = BNP)                                        



BASIC METABOLIC [id number] 10:18:00





             Test Item    Value        Reference Range Interpretation Comments

 

             SODIUM (test code = 134 mmol/L   136-145      L            



             NA)                                                 

 

             POTASSIUM (test code 3.9 mmol/L   3.5-5.1      N            



             = K)                                                

 

             CHLORIDE (test code = 101.0 mmol/L        N            



             CL)                                                 

 

             CARBON DIOXIDE (test 22.0 mmol/L  21-32        N            



             code = CO2)                                         

 

             ANION GAP (test code 14.9         10-20        N            



             = GAP)                                              

 

             GLUCOSE (test code = 367 mg/dL           H            



             GLU)                                                

 

             BLOOD UREA NITROGEN 9 mg/dL      7-18         N            



             (test code = BUN)                                        

 

             GLOMERULAR FILTRATION > 60 mL/min  >=60                      Estima

abelardo GFR by



             RATE (test code =                                        using Madi

fied MDRD



             GFR)                                                formula.Chronic



                                                                 kidney disease 

is



                                                                 defined as eith

er



                                                                 kidney damageor

 GFR



                                                                 <60 mL/min/1.73

 m2



                                                                 for >3 months.

 

             CREATININE (test code 1.20 mg/dL   0.7-1.3      N            



             = CREAT)                                            

 

             BUN/CREATININE RATIO 7.5          10-20        L            



             (test code =                                        



             BUN/CREA)                                           

 

             CALCIUM (test code = 8.7 mg/dL    8.5-10.1     N            



             CA)                                                 



HEPATIC FUNCTION [id number] 10:18:00





             Test Item    Value        Reference Range Interpretation Comments

 

             TOTAL PROTEIN (test 7.3 gram/dL  6.4-8.2      N            



             code = PROT)                                        

 

             ALBUMIN (test code = 3.7 g/dL     3.4-5.0      N            



             ALB)                                                

 

             GLOBULIN (test code = 3.6 gram/dL  2.7-4.2      N            



             GLOB)                                               

 

             ALBUMIN/GLOBULIN RATIO 1.0          0.75-1.50    N            



             (test code = A/G)                                        

 

             BILIRUBIN TOTAL (test 0.50 mg/dL   0.0-1.0      N            



             code = BILT)                                        

 

             BILIRUBIN DIRECT (test 0.12 mg/dL   0.0-0.20     N            



             code = BILD)                                        

 

             SGOT/AST (test code = 31 IUnit/L   15-37        N            



             AST)                                                

 

             SGPT/ALT (test code = 55 IUnit/L   12-78        N            



             ALT)                                                

 

             ALKALINE PHOSPHATASE 70 IUnit/L          N            **Note 

change in



             TOTAL (test code =                                        reference

 range due



             ALKP)                                               to change in



                                                                 reagent.**



LACTIC DEHYDROGENASE(LDH)2020 10:18:00





             Test Item    Value        Reference Range Interpretation Comments

 

             LACTIC DEHYDROGENASE(LDH) (test 119 IUnit/L         N        

    



             code = LDH)                                         



[id number] 10:18:00





             Test Item    Value        Reference Range Interpretation Comments

 

             LIPASE (test code = LIP) 159 U/L      73.0-393.0   N            



[id number] 10:18:00





             Test Item    Value        Reference Range Interpretation Comments

 

             TROPONIN-I (test code = TROPI) <0.015 ng/mL 0-0.045      N         

   



[id number] 10:18:00





             Test Item    Value        Reference Range Interpretation Comments

 

             FERRITIN (test code = KASIA) 151 ng/mL    8-388        N            



COVID 19 INHOUSE [id number] 10:18:00





             Test Item    Value        Reference Range Interpretation Comments

 

             COVID 19 INHOUSE AG (test code = NEGATIVE                          

     



             [id number])                                        



CBC W/AUTO [id number] 10:17:00





             Test Item    Value        Reference Range Interpretation Comments

 

             WHITE BLOOD CELL (test code = 6.1 K/mm3    4.5-12.5     N          

  



             WBC)                                                

 

             RED BLOOD CELL (test code = 4.62 mill/mm3 4.0-5.8      N           

 



             RBC)                                                

 

             HEMOGLOBIN (test code = HGB) 14.4 gram/dL 13.0-17.5    N           

 

 

             HEMATOCRIT (test code = HCT) 41.9 %       42.0-52.0    L           

 

 

             MEAN CELL VOLUME (test code = 90.7 fL      80-98        N          

  



             MCV)                                                

 

             MEAN CELL HGB (test code = MCH) 31.2 picogram 27.0-33.0    N       

     

 

             MEAN CELL HGB CONCETRATION 34.4 gram/dL 33.0-36.0    N            



             (test code = MCHC)                                        

 

             RED CELL DISTRIBUTION WIDTH 11.9 %       11.6-16.2    N            



             (test code = RDW)                                        

 

             RED CELL DISTRIBUTION WIDTH SD 39.0 fL      37.0-51.0    N         

   



             (test code = RDW-SD)                                        

 

             PLATELET COUNT (test code = 182 K/mm3    150-450      N            



             PLT)                                                

 

             MEAN PLATELET VOLUME (test code 11.1 fL      6.7-11.0     H        

    



             = MPV)                                              

 

             NEUTROPHIL % (test code = NT%) 52.0 %       39.0-69.0    N         

   

 

             IMMATURE GRANULOCYTE % (test 0.8 %        0.0-5.0      N           

 



             code = IG%)                                         

 

             LYMPHOCYTE % (test code = LY%) 37.4 %       25.0-55.0    N         

   

 

             MONOCYTE % (test code = MO%) 6.3 %        0.0-10.0     N           

 

 

             EOSINOPHIL % (test code = EO%) 2.5 %        0.0-5.0      N         

   

 

             BASOPHIL % (test code = BA%) 1.0 %        0.0-1.0      N           

 

 

             NUCLEATED RBC % (test code = 1.0 %        0-0          H           

 



             NRBC%)                                              

 

             NEUTROPHIL # (test code = NT#) 3.15 K/mm3   1.8-7.7      N         

   

 

             IMMATURE GRANULOCYTE # (test 0.05 x10 3/uL 0-0.03       H          

  



             code = IG#)                                         

 

             LYMPHOCYTE # (test code = LY#) 2.26 K/mm3   1.0-5.0      N         

   

 

             MONOCYTE # (test code = MO#) 0.38 K/mm3   0-0.8        N           

 

 

             EOSINOPHIL # (test code = EO#) 0.15 K/mm3   0.0-0.5      N         

   

 

             BASOPHIL # (test code = BA#) 0.06 K/mm3   0.0-0.2      N           

 

 

             NUCLEATED RBC # (test code = 0.06 K/mm3   0.0-0.1      N           

 



             NRBC#)                                              

 

             MANUAL DIFF REQUIRED (test code NO                                 

    



             = MDIFF)                                            



C REACTIVE [id number] 10:14:00





             Test Item    Value        Reference Range Interpretation Comments

 

             C REACTIVE PROTEIN (test code = 0.93 mg/dL   0-0.3        H        

    



             CRP)                                                



CBC W/AUTO [id number] 10:13:00





             Test Item    Value        Reference Range Interpretation Comments

 

             WHITE BLOOD CELL (test code =  K/mm3       4.5-12.5                

  



             WBC)                                                

 

             RED BLOOD CELL (test code = RBC)  mill/mm3    4.0-5.8              

     

 

             HEMOGLOBIN (test code = HGB) 14.4 gram/dL 13.0-17.5    N           

 

 

             HEMATOCRIT (test code = HCT)  %           42.0-52.0                

 

 

             MEAN CELL VOLUME (test code =  fL          80-98                   

  



             MCV)                                                

 

             MEAN CELL HGB (test code = MCH)  picogram    27.0-33.0             

    

 

             MEAN CELL HGB CONCETRATION (test  gram/dL     33.0-36.0            

     



             code = MCHC)                                        

 

             RED CELL DISTRIBUTION WIDTH  %           11.6-16.2                 



             (test code = RDW)                                        

 

             RED CELL DISTRIBUTION WIDTH SD  fL          37.0-51.0              

   



             (test code = RDW-SD)                                        

 

             PLATELET COUNT (test code = PLT) 182 K/mm3    150-450      N       

     

 

             MEAN PLATELET VOLUME (test code  fL          6.7-11.0              

    



             = MPV)                                              

 

             NEUTROPHIL % (test code = NT%)  %           39.0-69.0              

   

 

             IMMATURE GRANULOCYTE % (test  %           0.0-5.0                  

 



             code = IG%)                                         

 

             LYMPHOCYTE % (test code = LY%)  %           25.0-55.0              

   

 

             MONOCYTE % (test code = MO%)  %           0.0-10.0                 

 

 

             EOSINOPHIL % (test code = EO%)  %           0.0-5.0                

   

 

             BASOPHIL % (test code = BA%)  %           0.0-1.0                  

 

 

             NEUTROPHIL # (test code = NT#)  K/mm3       1.8-7.7                

   

 

             LYMPHOCYTE # (test code = LY#)  K/mm3       1.0-5.0                

   

 

             MONOCYTE # (test code = MO#)  K/mm3       0-0.8                    

 

 

             EOSINOPHIL # (test code = EO#)  K/mm3       0.0-0.5                

   

 

             BASOPHIL # (test code = BA#)  K/mm3       0.0-0.2                  

 



BASIC METABOLIC [id number] 10:06:00





             Test Item    Value        Reference Range Interpretation Comments

 

             SODIUM (test code = NA) 134 mmol/L   136-145      L            

 

             POTASSIUM (test code = K) 3.9 mmol/L   3.5-5.1      N            

 

             CHLORIDE (test code = CL) 101.0 mmol/L        N            

 

             CARBON DIOXIDE (test code = CO2)  mmol/L      21-32                

     

 

             ANION GAP (test code = GAP)              10-20                     

 

             GLUCOSE (test code = GLU)  mg/dL                           

 

             BLOOD UREA NITROGEN (test code =  mg/dL       7-18                 

     



             BUN)                                                

 

             GLOMERULAR FILTRATION RATE (test  mL/min      >=60                 

     



             code = GFR)                                         

 

             CREATININE (test code = CREAT)  mg/dL       0.7-1.3                

   

 

             BUN/CREATININE RATIO (test code              1020                 

    



             = BUN/CREA)                                         

 

             CALCIUM (test code = CA)  mg/dL       8.5-10.1                  



HEPATIC FUNCTION [id number] 10:06:00





             Test Item    Value        Reference Range Interpretation Comments

 

             TOTAL PROTEIN (test code = PROT)  gram/dL     6.4-8.2              

     

 

             ALBUMIN (test code = ALB)  g/dL        3.4-5.0                   

 

             GLOBULIN (test code = GLOB)  gram/dL     2.7-4.2                   

 

             ALBUMIN/GLOBULIN RATIO (test code =              0.75-1.50         

        



             A/G)                                                

 

             BILIRUBIN TOTAL (test code = BILT)  mg/dL       0.0-1.0            

       

 

             BILIRUBIN DIRECT (test code = BILD)  mg/dL       0.0-0.20          

        

 

             SGOT/AST (test code = AST)  IUnit/L     15-37                     

 

             SGPT/ALT (test code = ALT)  IUnit/L     12-78                     

 

             ALKALINE PHOSPHATASE TOTAL (test  IUnit/L                    

     



             code = ALKP)                                        



LACTIC DEHYDROGENASE(LDH)2020 10:06:00





             Test Item    Value        Reference Range Interpretation Comments

 

             LACTIC DEHYDROGENASE(LDH) (test code  IUnit/L                

         



             = LDH)                                              



[id number] 10:06:00





             Test Item    Value        Reference Range Interpretation Comments

 

             LIPASE (test code = LIP)  U/L         73.0-393.0                



[id number] 10:06:00





             Test Item    Value        Reference Range Interpretation Comments

 

             TROPONIN-I (test code = TROPI)  ng/mL       0-0.045                

   



[id number] 10:06:00





             Test Item    Value        Reference Range Interpretation Comments

 

             FERRITIN (test code = KASIA)  ng/mL       8-388                     



- XR FOOT 3 + V RT2020-03-10 14:53:00 FAX: Jordon Doll DO   
[phone]    [place]:   St: REG-------------------------------
------------------------------------------------  Name:   WORKFLETCHERTHOM      
          [facility]                    : 1985  Age/S: 34/M       
   [address] [place] [address]         Unit#: [id number]      [place]: [place] [address]                 Phys: Jordon Barrow DO                      
                     Acct: [id number] Dis Date:               Status: REG ER  
                              PHONE #: [phone]     Exam Date:     
03/10/2020     1435           FAX #: [phone]     Reason: FOOT PAIN         
                                EXAMS:                                          
   CPT CODE:      [id number] XR FOOT 3 + V RT             77014                  
 PROCEDURE: Right foot 3 views               INDICATION: Foot pain post trauma. 
10 foot fall from a ladder.               COMPARISON: There are no previous 
relevant studies available for       correlation.               FINDINGS: 3 
views of the right foot were obtained utilizing portable       equipment.  The 
films are technically suboptimal.  Limited assessment     of the toes.  There is
no fracture, dislocation or other acute       skeletal abnormality.  Smoothly 
marginated osteophyte dorsal talus,       etiology undetermined.  There are 
small osteophyte anterior corner of       the tibia.  The joint space is 
maintained.  No gross joint effusion.        Small calcaneal enthesophyte at the
insertion of plantar fascia.  The       soft tissues are unremarkable and 
limitations of technique.                 IMPRESSION:          1.  Limited right
foot radiographs demonstrating no acute abnormality.         2.  Talar 
osteophyte, etiology undetermined.  Degenerative changes of         the ankle 
are possible.  If there is continued clinical concern,         further imaging 
options would include MRI.                   SL:  OLE                   ** 
Electronically Signed by JED Escamilla **           **             on 
03/10/2020 at 1453             **                  Reported and signed by: 
Shan Escamilla M.D.                CC: Jordon Barrow DO                 
Technologist: Ramona Fajardo RT(R); Danita PorterRT(R)         Trnscrd 
Date/Time/By: 03/10/2020 (3410) : By: Yash           Orig Print D/T: S: 
03/10/2020 (2827)           PAGE  1                       Signed Report- XR 
TIBIA/FIBULA 2 V RT2020-03-10 14:50:00 FAX: Jordon Doll DO   
[phone]    [place]:   St: REG-------------------------------
------------------------------------------------  Name:   THOM MELISSA      
          [facility]                    : 1985  Age/S: 34/M       
   [address] [place] [address]         Unit#: [id number]      Loc: [place] [address]                 Phys: Jordon Barrow DO                      
                     Acct: [id number] Dis Date:               Status: REG ER  
                              PHONE #: [phone]     Exam Date:     
03/10/2020     1435           FAX #: [phone]     Reason: LEG PAIN          
                                EXAMS:                                          
   CPT CODE:      [id number] XR TIBIA/FIBULA 2 V RT             14741            
       PROCEDURE: Right leg 2 views               INDICATION: Leg pain post 
trauma.  10 foot fall from ladder.               COMPARISON: There are no 
previous relevant studies available for       correlation.               
FINDINGS: No bone, joint or soft tissue abnormality demonstrated.               
 IMPRESSION: Normal radiographs.                   SL:  OLE          ** 
Electronically Signed by JED Escamilla **           **             on 
03/10/2020at 1450             **                      Reported and signed by: 
Shan Escamilla M.D.               CC: Jordon Barrow DO                       
                              Technologist: RT Carrol(R); Danita LariosRT(R)         Trnscrd Date/Time/By: 03/10/2020 (4009) : By: Yash   
       Orig Print D/T: S: 03/10/2020 (3614)                         PAGE  1     
                 Signed Report- XR FEMUR MIN 2 VWS RT2020-03-10 14:49:00 FAX: Jordon Doll DO   [phone]    [place]:   St: 
REG-------------------------------
------------------------------------------------  Name:   WORKIZERTHOM      
          [facility]                    : 1985  Age/S: 34/M       
   [address] [place] [address]         Unit#: [id number]      Loc: [place] [address]                 Phys: Jordon Barrow DO                      
                     Acct: [id number] Dis Date:               Status: REG ER  
                              PHONE #: [phone]     Exam Date:     
03/10/2020     1435           FAX #: [phone]     Reason: THIGH PAIN        
                                EXAMS:                                          
   CPT CODE:      [id number] XR FEMUR MIN 2 VWS RT             57700             
      PROCEDURE: Right femur 2 views               INDICATION: Thighpain post 
trauma.  10 foot fall from a ladder.               COMPARISON: Current pelvic 
radiographs             FINDINGS: No bone, joint or soft tissue abnormality 
demonstrated.                 IMPRESSION: Normal radiographs.                   
SL:  OLE                   ** Electronically Signed by JED Escamilla ** 
         **             on 03/10/2020 at 1362             **     Reported and 
signed by: Shan Escamilla M.D.                            CC: Jordon Barrow DO                Technologist: Ramona Fajardo RT(R); Danita PorterRT(R)  
      Trnscrd Date/Time/By: 03/10/2020 (8556) : By: Yash           Orig 
Print D/T: S: 03/10/2020 (0649)          PAGE  1                       Signed 
Report- XR L-SPINE 2/3 VIEWS2020-03-10 14:47:00 FAX: Jordon Doll DO 
 [phone]    [place]:   St: REG-------------------------------
------------------------------------------------  Name:   WORKIZERTHOM                    : 1985  Age/S: 34/M       
   [address] [place] [address]         Unit#: [id number]      Loc: [place] [address]                 Phys: Jordon Barrow DO                      
                     Acct: [id number] Dis Date:               Status: REG ER  
                              PHONE #: [phone]     Exam Date:     
03/10/2020     1436           FAX #: [phone]     Reason: BACK PAIN         
                                EXAMS:                                          
   CPT CODE:      [id number] XR L-SPINE 2/3 VIEWS             47824              
     PROCEDURE: Lumbar spine AP and lateral radiographs.  3 views.             
INDICATION: BACK PAIN.               COMPARISON: CT abdomen and pelvis dated 
2019,2016.               FINDINGS: Normal alignment of the spine.  No 
evidence for acute bony       fracture or dislocation.  Mild degenerative 
changes.  Irregular       sclerotic osteophyte along the anterior inferior L1 
vertebral body       appears stable.  Surrounding soft tissues appear 
unremarkable.                 IMPRESSION: No acute abnormality identified.      
            SL: [id number]              ** Electronically Signed by JED Duran **         **                on 03/10/2020 at 1447         
     **                      Reported and signed by: Thom Duran M.D. 
                       CC: Jordon Barrow DO                                    
                                Technologist: Ramona Fajardo, RT(R); RT Froilan(R)         Trnscrd Date/Time/By: 03/10/2020 (1447) : By: [email or web address] 
Orig Print D/T: S: 03/10/2020 (2184)                         PAGE  1            
          Signed Report- XR PELVIS 1/2 VIEWS2020-03-10 14:47:00 FAX: Jordon Doll DO   [phone]    [place]:   St: 
REG-------------------------------
------------------------------------------------  Name:   THOM MELISSA                    : 1985  Age/S: 34/M       
   [address] [place] [address]         Unit#: [id number]      Loc: [place] [address]                 Phys: Jordon Barrow DO                      
                     Acct: [id number] Dis Date:               Status: REG ER  
                              PHONE #: [phone]     Exam Date:     
03/10/2020     1435           FAX #: [phone]     Reason: PELVIC PAIN       
                                EXAMS:                                          
   CPT CODE:      [id number] XR PELVIS 1/2 VIEWS             55507               
    PROCEDURE: PELVIS SINGLE VIEW               INDICATION: Pelvicpain post 
trauma.  10 foot fall from ladder.               COMPARISON: Current right femur
radiographs               FINDINGS: The pelvic ring is intact.  The proximal 
femora are intact       and located.  No soft tissue abnormality demonstrated.  
            COMMENTS: If there is continued clinical concern, further imaging   
   options include CT and MRI.                 IMPRESSION: Normal radiograph.   
               SL:  OLE                   ** Electronically Signed by JED Escamilla **           **             on 03/10/2020 at 3817             **
                     Reported and signed by: Shan Escamilla M.D.              
         CC: Jordon Barrow DO                                                  
                                        Technologist: RT Carrol(R); 
RT Froilan(R)         Trnscmandy Date/Time/By: 03/10/2020 (7771) :By: 
Yash           Orig Print D/T: S: 03/10/2020 (0266)                        
PAGE  1              Signed Report- XR KNEE 1 OR 2 V RT2020-03-10 14:46:00 FAX: 
Jordon Doll DO   [phone]    [place]: [employer]  St: 
REG-------------------------------
------------------------------------------------  Name:   THOM MELISSA                    : 1985  Age/S: 34/M       
   [address] [place] [address]         Unit#: [id number]      Loc: [place] [address]                 Phys: Jordon Barrow                        
                     Acct: [id number] Dis Date:               Status: REG ER  
                              PHONE #: [phone]     Exam Date:     
03/10/2020     1435           FAX #: [phone]     Reason: KNEE PAIN         
                                EXAMS:                                          
   CPT CODE:      [id number] XR KNEE 1 OR 2 V RT             80300               
    PROCEDURE: Right knee 2 views               INDICATION: Knee pain post 
trauma, fall from a ladder.               COMPARISON: There are no previous 
relevant studiesavailable for       correlation.               FINDINGS: No 
bone, joint or soft tissue abnormality demonstrated.                 IMPRESSION:
Normal radiographs.                   SL:  KL-H     ** Electronically Signed by 
JED Escamilla **           **             on 03/10/2020 at 1446          
  **                      Reported and signed by: Shan Escamilla M.D.         
CC: Jordon Barrow DO                                                 
Technologist: Ramona Fajardo RT(R); RT Froilan(R)         [facility] 
Date/Time/By: 03/10/2020 (0956) : By: JaylinL           Joseph Print D/T: S: 
03/10/2020 (4991)                         PAGE  1                       Signed 
Report- XR CHEST 2 -22-84 20:23:00 FAX: Michel Knowles MD 
[phone]    [place]:   St: PRE-------------------------------
------------------------------------------------  Name:   THOM MELISSA                    : 1985  Age/S: 34/M       
   [address] [place] [address]         Unit#: [id number]      Loc: [place] [address]                 Phys: Michel Umanzor MD                    
                       Acct: [id number] Dis Date:               Status: PRE ER
                                PHONE #: [phone]     Exam Date:     
2020           FAX #: [phone]     Reason: productive cough  
                                EXAMS:                                          
   CPT CODE:      [id number] XR CHEST 2 V             74693                    
PROCEDURE: Chest Radiograph.                Clinical Indication: Productive 
cough, chest congestion.               Comparison: Chest radiograph 2019.  
  FINDINGS:                The chest shows normal lung volumes without 
interstitial or airspace    opacities, pleural effusions or pneumothorax.       
                The heart size and pulmonary vasculature are normal. The trachea
is       midline. There are no clinically significant osseous abnormalities     
 noted.                  IMPRESSION:         1. No chest radiographic evidence 
of acute cardiopulmonary disease.                                       SL: 
OCO-H    ** Electronically Signed by JED Dalton **           **      
       on 2020 at              **                      Reported and 
signed by: Eitan Dalton M.D. CC: Michel Umanzor MD                       
                Technologist: RT Michelle(MIKEL)        Trnscrd 
Date/Time/By: 2020 () : By: SteffiTDO           Orig Print D/T: S: 
2020 ()                         PAGE  1                       Signed 
Report- CT ABD PELVIS W/YOUP8577-01-14 15:11:00  Name: THOM MELISSA [facility]                     : 1985 Age/S: 34  / M       
 [address] [facility]                Unit #: [id number]     Loc:               
[place]  [address]              Phys: Lady Limon NP              
                           Acct: [id number]  Dis Date:               Status: 
REG ER                                  PHONE #: [phone]     Exam Date: 
2019  1424                     FAX #: [phone]      Reason: L SIDE ABD
PAIN                                     EXAMS:                                 
             CPTCODE:      [id number] CT ABD PELVIS W/CONT                       
13516                    REASON FOR EXAM: L SIDE ABD PAIN               EXAM 
ORDER DATE: 2019 10:33 AM               Ordering M.D.: Lady Limon NP               PROCEDURE:  - CT ABD PELVIS W/CONT  contrast-enhanced
axial CT images       were acquired through the abdomen/pelvis at 5 mm 
intervals.  Sagittal       and coronal reformatted images were generated.  
Automated exposure       control was utilized for this reduction.               
Phases of contrast: venous and delayed               COMPARISON: CT of the 
abdomen and pelvis 2019               FINDINGS:                
Visualized thorax: Normal.   Hepatobiliary system: Hepatomegaly. No masses or 
contour abnormality.       Gallbladder is withinnormal limits.               
Pancreas: Normal               Spleen: Normal               Adrenal glands: 
Normal               Genitourinary system: Normal               Gastrointestinal
tract and appendix: There are areas of fatty       metaplasia of the colonic 
submucosa which may be sequela of previous       infectious or inflammatory 
episodes. No acute findings. Specifically       no abnormal bowel distention or 
mural thickening or adjacent fat       stranding. Stomach and small intestine 
are within normal limits.               Abdominal vascular structures: Normal   
           Peritoneum and retroperitoneum: No free fluid or free air.  No 
omental       or mesenteric masses.  No abnormal lymphnodes.               
Musculoskeletal structures and abdominal wall: There are mild       degenerative
changes in the spine.                 IMPRESSION:     PAGE  1                   
   Signed Report                 (CONTINUED)   Name: THOM MELISSA [facility]     : 1985 Age/S: 34  / M         [address] [facility]
               Unit #: [id number]     Loc:               [place]  [address]   
          Phys: Lady Limon NP                        Acct: 
[id number]  Dis Date:               Status: REG ER               PHONE #: 
[phone]     Exam Date: 2019  1424                     FAX #: [phone]
[phone]      Reason: L SIDE ABD PAIN                                     EXAMS:    
                    CPT CODE:      [id number] CT ABD PELVIS W/CONT               
       [address]            &lt;Continued&gt;          No acute intra-abdominal 
process.         Fatty metaplasia ofthe colonic submucosa may represent sequela 
of         previous episodes of infection and/or inflammation.          ** 
Electronically Signed by Mariano Milton MD on 2019 at 1511 **   Reported and 
signed by: Mariano Milton MD                                   CC: Lady Limon  NP; Elver Sandhu DO              Technologist:Zabrina Naik 
RT(R)(CT)     CTDI:        DLP:        Trnscb Date/Time: 2019 (1511) 
t.SDR.RR31                       Orig Print D/T: S: 2019 (2004)      PAGE 
2                       Signed Report- CT HEAD/BRAIN W/O [id number] 14:36:00
 Name: WORKIZERTHOM [facility]                     :
1985 Age/S: 34  / M         [address] [facility]                Unit #: 
[id number]     Loc:               [place]  [address]              Phys: 
Lady Limon NP                                          Acct: 
[id number]  Dis Date:               Status: REG ER                            
     PHONE #: [phone]     Exam Date: 2019  3555                     
FAX #: [phone]      Reason: HEADACHE                                       
    EXAMS:                                               CPTCODE:      [id number]
CT HEAD/BRAIN W/O CONT                     03178                    HISTORY:  HE
ADACHE               TECHNIQUE: Noncontrast 2.5 mm axial CT of the head. 
Examination       acquired within 24 hours of arrival. Automated exposure 
control for       dose reduction.               COMPARISON: Noncontrast CT brain
2019               FINDINGS:               No lacerations or contusions
of the scalp or facial soft tissues..        Calvarium and skull base are 
intact. No acute hemorrhage. No intracranial mass, mass effect, or midline      
shift. No effacement of the sulci or grey-white matter interface to       
suggest acute infarct.                No cortical atrophy.  No signs of white 
matter small-vessel disease.               No hydrocephalus.. No extra-axial
fluid collection.                Polypoid mucosal thickening in the bilateral 
maxillary sinuses, worse       on the left side.        Postsurgical changes of 
right-sided mastoidectomy. Partial       opacification of the left mastoid air 
cells, similar to prior exam.       Orbital contents are unremarkable.          
               IMPRESSION:                   No acute findings and no 
significant change from previous exam.         Partial opacification of the left
mastoid air cells. Correlate clinically for mastoiditis.          ** 
Electronically Signed by Mariano Milton MD on 2019 at 1436 **                
     Reported and signed by: Mariano Milton MD      CC: Lady Limon  NP;
Elver Sandhu DO        Technologist:Zabrina Naik RT(R)(CT)     CTDI:      
 DLP:        Trnscb Date/Time: 2019 (2496) t.SDR.RR31                     
 Orig Print D/T: S: 2019 (1370)      PAGE  1                 Signed Report
URINALYSIS [id number] 13:08:00





             Test Item    Value        Reference Range Interpretation Comments

 

             UA COLOR (test code = COLU) YELLOW       YELLOW                    

 

             UA APPEARANCE (test code = CLEAR        CLEAR                     



             APPU)                                               

 

             UA GLUCOSE DIPSTICK (test >1000 (4+) mg/dL NEGATIVE                

  



             code = DGLUU)                                        

 

             UA BILIRUBIN DIPSTICK (test NEGATIVE mg/dL NEGATIVE                

  



             code = BILU)                                        

 

             UA KETONE DIPSTICK (test NEGATIVE mg/dL NEGATIVE                  



             code = KETU)                                        

 

             UA SPECIFIC GRAVITY (test 1.028        1.001-1.035               



             code = SGU)                                         

 

             UA BLOOD DIPSTICK (test code Negative mg/dL NEGATIVE               

   



             = INDIO)                                              

 

             UA PH DIPSTICK (test code = 5.5          5.0-8.0                   



             GERRY)                                                

 

             UA PROTEIN DIPSTICK (test 30 (1+) mg/dL NEGATIVE     A            



             code = PROU)                                        

 

             UA UROBILINIOGEN DIPSTICK Normal mg/dL NEGATIVE                  



             (test code = URO)                                        

 

             UA NITRITE DIPSTICK (test NEGATIVE     NEGATIVE                  



             code = KRISTIN)                                        

 

             UA LEUKOCYTE ESTERASE W NEGATIVE Blayne/uL NEGATIVE                  



             REFLEX (test code = LEUUR)                                        

 

             UA WBC (test code = WBCU) 0-5 per HPF  0-5                       

 

             UA RBC (test code = RBCU) 0-2 #/HPF    0-5                       

 

             UA EPITHELIAL CELLS (test FEW per HPF  FEW                       



             code = EPIU)                                        

 

             UA BACTERIA (test code = FEW #/HPF    NONE                      



             BACU)                                               

 

             UA URIC ACID CRYSTALS (test FEW #/HPF    NONE                      



             code = URIU)                                        

 

             UA MUCUS (test code = MUCU) FEW #/LPF    FEW                       



Urine Source? Clean CatchDRUGS OF ABUSE SCREEN [id number] 13:08:00





             Test Item    Value        Reference Range Interpretation Comments

 

             URN COCAINE (test code = COCAURN) NEGATIVE     <300 ng/mL          

      

 

             URN CANNABINOIDS (test code = NEGATIVE     <50 ng/mL               

  



             CANNABURN)                                          

 

             URN AMPHETAMINE (test code = NEGATIVE     <1000 ng/mL              

 



             AMPHETURN)                                          

 

             URN BARBITURATE (test code = NEGATIVE     <200 ng/mL               

 



             BARBITURN)                                          

 

             URN BENZODIAZEPINE (test code = NEGATIVE     <200 ng/mL            

    



             BENZOURN)                                           

 

             URN OPIATES (test code = OPIATURN) NEGATIVE     <300 ng/mL         

       

 

             URN PHENCYCLIDINE (PCP) (test code = NEGATIVE     <25 ng/mL        

         



             PHENCURN)                                           

 

             URN METHADONE (test code = METHAURN) NEGATIVE     <300 ng/mL       

         



Urine Source? Clean CatchURINALYSIS [id number] 12:39:00





             Test Item    Value        Reference Range Interpretation Comments

 

             UA COLOR (test code = COLU)              YELLOW                    

 

             UA APPEARANCE (test code = APPU)              CLEAR                

     

 

             UA BILIRUBIN DIPSTICK (test code =              NEGATIVE           

       



             BILU)                                               

 

             UA SPECIFIC GRAVITY (test code =              1.001-1.035          

     



             SGU)                                                

 

             UA PH DIPSTICK (test code = EGRRY)              5.0-8.0              

     

 

             UA UROBILINIOGEN DIPSTICK (test code  mg/dL       0.0-0.2          

         



             = URO)                                              

 

             UA NITRITE DIPSTICK (test code =              NEGATIVE             

     



             KRISTIN)                                               

 

             UA LEUKOCYTE ESTERASE W REFLEX (test              NEGATIVE         

         



             code = LEUUR)                                        

 

             UA WBC (test code = WBCU)  per HPF     0-5                       

 

             UA RBC (test code = RBCU)  per HPF     0-5                       

 

             UA EPITHELIAL CELLS (test code =  per HPF     Few                  

     



             EPIU)                                               

 

             UA BACTERIA (test code = BACU)  per HPF     NONE                   

   



Urine Source? Clean CatchDRUGS OF ABUSE SCREEN [id number] 12:39:00





             Test Item    Value        Reference Range Interpretation Comments

 

             URN COCAINE (test code = COCAURN) NEGATIVE     <300 ng/mL          

      

 

             URN CANNABINOIDS (test code = NEGATIVE     <50 ng/mL               

  



             CANNABURN)                                          

 

             URN AMPHETAMINE (test code = NEGATIVE     <1000 ng/mL              

 



             AMPHETURN)                                          

 

             URN BARBITURATE (test code = NEGATIVE     <200 ng/mL               

 



             BARBITURN)                                          

 

             URN BENZODIAZEPINE (test code = NEGATIVE     <200 ng/mL            

    



             BENZOURN)                                           

 

             URN OPIATES (test code = OPIATURN) NEGATIVE     <300 ng/mL         

       

 

             URN PHENCYCLIDINE (PCP) (test code = NEGATIVE     <25 ng/mL        

         



             PHENCURN)                                           

 

             URN METHADONE (test code = METHAURN) NEGATIVE     <300 ng/mL       

         



Urine Source? Clean CatchBASIC METABOLIC [id number] 11:59:00





             Test Item    Value        Reference Range Interpretation Comments

 

             SODIUM (test code = 134 mmol/L   136-145      L            



             NA)                                                 

 

             POTASSIUM (test code 4.9 mmol/L   3.5-5.1      N            



             = K)                                                

 

             CHLORIDE (test code = 101.0 mmol/L        N            



             CL)                                                 

 

             CARBON DIOXIDE (test 23.0 mmol/L  21-32        N            



             code = CO2)                                         

 

             ANION GAP (test code 14.9         10-20        N            



             = GAP)                                              

 

             GLUCOSE (test code = 251 mg/dL           H            



             GLU)                                                

 

             BLOOD UREA NITROGEN 9 mg/dL      7-18         N            



             (test code = BUN)                                        

 

             GLOMERULAR FILTRATION > 60 mL/min  >=60                      Estima

abelardo GFR by



             RATE (test code =                                        using Madi

fied MDRD



             GFR)                                                formula.Chronic



                                                                 kidney disease 

is



                                                                 defined as [facility]



                                                                 kidney damageor

 GFR



                                                                 <60 mL/min/1.73

 m2



                                                                 for >3 months.

 

             CREATININE (test code 1.10 mg/dL   0.7-1.3      N            



             = CREAT)                                            

 

             BUN/CREATININE RATIO 8.2          10-20        L            



             (test code =                                        



             BUN/CREA)                                           

 

             CALCIUM (test code = 9.3 mg/dL    8.5-10.1     N            



             CA)                                                 



HEPATIC FUNCTION [id number] 11:59:00





             Test Item    Value        Reference Range Interpretation Comments

 

             TOTAL PROTEIN (test 8.2 gram/dL  6.4-8.2      N            



             code = PROT)                                        

 

             ALBUMIN (test code = 3.9 g/dL     3.4-5.0      N            



             ALB)                                                

 

             GLOBULIN (test code = 4.3 gram/dL  2.7-4.2      H            



             GLOB)                                               

 

             ALBUMIN/GLOBULIN RATIO 0.9          0.75-1.50    N            



             (test code = A/G)                                        

 

             BILIRUBIN TOTAL (test 0.70 mg/dL   0.0-1.0      N            



             code = BILT)                                        

 

             BILIRUBIN DIRECT (test 0.08 mg/dL   0.0-0.20     N            



             code = BILD)                                        

 

             SGOT/AST (test code = 92 IUnit/L   15-37        H            



             AST)                                                

 

             SGPT/ALT (test code = 84 IUnit/L   12-78        H            



             ALT)                                                

 

             ALKALINE PHOSPHATASE 70 IUnit/L          N            **Note 

change in



             TOTAL (test code =                                        reference

 range due



             ALKP)                                               to change in



                                                                 reagent.**



[id number] 11:59:00





             Test Item    Value        Reference Range Interpretation Comments

 

             LIPASE (test code = LIP) 129 U/L      73.0-393.0   N            



BASIC METABOLIC [id number] 11:58:00





             Test Item    Value        Reference Range Interpretation Comments

 

             SODIUM (test code = 134 mmol/L   136-145      L            



             NA)                                                 

 

             POTASSIUM (test code 4.9 mmol/L   3.5-5.1      N            



             = K)                                                

 

             CHLORIDE (test code = 101.0 mmol/L        N            



             CL)                                                 

 

             CARBON DIOXIDE (test 23.0 mmol/L  21-32        N            



             code = CO2)                                         

 

             ANION GAP (test code 14.9         10-20        N            



             = GAP)                                              

 

             GLUCOSE (test code = 251 mg/dL           H            



             GLU)                                                

 

             BLOOD UREA NITROGEN 9 mg/dL      7-18         N            



             (test code = BUN)                                        

 

             GLOMERULAR FILTRATION > 60 mL/min  >=60                      Estima

abelardo GFR by



             RATE (test code =                                        using Madi

fied MDRD



             GFR)                                                formula.Chronic



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 THOM MELISSA                    : 1985 Age/S: 34  / M         [address] [place] [address]         Unit #: [id number]     Loc:               [place] [address]     Phys: Onur Mendez MD                                                 
      Acct: [id number]  Dis Date:               Status: REG ER                
                 PHONE #: [phone]     ExamDate: 2019           
         FAX #: [phone]      Reason: HEADACHE                           
EXAMS:                                               CPT CODE:      [id number] CT
HEAD/BRAIN W/O CONT                     [id number]               &lt;Continued&gt;   
    The lungapices reveal no acute process.                   IMPRESSION:       
           CT HEAD:      1.  There is no acute intracranial process.         2. 
There is decreased chronic paranasal sinus disease.         3.  There is a 
stable moderate left mastoid air cell and middle ear         effusion.          
        CT CERVICAL SPINE:                   1.  There is no acute cervical 
spine fracture or dislocation.         2.  There is moderate palatine tonsillar 
hypertrophy, likely reactive.                                       ** 
Electronically Signed by NAV Minor **            **            on 
2019 at 194            **                      Reported and signed by: 
NAV Mullins                    CC: Onur Mendez MD                           
                                           Technologist:George Garrido, RT(R); 
Marisela BEAN  CTDI:        DLP:        Trnscb Date/Time: 2019 () 
[id number]        Orig Print D/T: S: 2019 ()      PAGE  2             
         Signed Report- CT C-SPINE W/O [id number] 19:41:00  Name: 
THOM MELISSA                    : 
1985 Age/S: 34  / M         [address] [place] [address]         Unit #: 
[id number]     Loc:               [place], [id number]                 Phys: 
Onur Mendez MD                                                        Acct: 
[id number]  Dis Date:               Status: REG ER                            
     PHONE #: [phone]     Exam Date: 2019                     
FAX #: [phone]      Reason: NECK PAIN                                      
    EXAMS:                                               CPTCODE:      [id number]
CT C-SPINE W/O CONT                        00878                    UNENHANCED C
T HEAD, UNENHANCED CT CERVICAL SPINE               INDICATION: Head pain and 
neck pain after an injury.  Altered mental       status               TECHNIQUE:
Unenhanced CT was performed from the skull vertex to the       foramen magnum 
with axial, coronal and sagittal reconstructions.        Radiationdose length 
product 472 mGy-cm.  Unenhanced CT was performed       of the cervical spine 
with axial,coronal and sagittal       reconstructions.  CT imaging performed at 
this location utilizes       radiation dose optimization technique which 
includes one or more of       the followin) Automated exposure control; 2) 
Adjustment of the mA       and/or kV according to patient's size; 3) Use of 
iterative       reconstruction techniques.  DLP (mGy-cm): 330               
COMPARISONS: CT head and cervical spine 2010               FINDINGS:       
       CT HEAD:       There is decreased paranasalsinus disease with mild 
mucosal       thickening of the maxillary, ethmoid and frontal sinuses but no 
fluid       level.               There chronic surgical changes of right 
mastoidectomy.  There is a      stable moderate left mastoid air cell and middle
ear effusion.                There is no acute depressed skull fracture.        
      The cerebral ventricles are normal caliber. There is no cerebral mass     
 effect, midline shift, intracranial hemorrhage or acute large vessel       
territorycerebral cortical edema.                 CT CERVICAL SPINE:       There
is an incidental pseudoarthrosis of the left craniocervical       junction, a 
developmental segmentation anomaly.  There is no acute       cervical spine 
fracture or dislocation.  There is no spinal canal       stenosis.    There is 
moderate palatine tonsillar hypertrophy.  There is no       peritonsillar 
abscess detected.               There is no cervical lymphadenopathy, mass or 
organized fluid       collection.         PAGE  1                       Signed 
Report                    (CONTINUED)   Name: THOM MELISSA                  
[facility]                    : 1985 Age/S: 34  / M         [address] [place] [address]         Unit #: [id number]     Loc:               [place] [address]     Phys: Onur Mendez MD                                                 
      Acct: [id number]  Dis Date:               Status: REG ER                
                 PHONE #: [phone]     ExamDate: 2019           
         FAX #: [phone]      Reason: NECK PAIN                           
EXAMS:                                               CPT CODE:      [id number] CT
C-SPINE W/O CONT                        91280               &lt;Continued&gt;   
    The lungapices reveal no acute process.                   IMPRESSION:       
           CT HEAD:      1.  There is no acute intracranial process.         2. 
There is decreased chronic paranasal sinus disease.         3.  There is a 
stable moderate left mastoid air cell and middle ear         effusion.          
        CT CERVICAL SPINE:                   1.  There is no acute cervical 
spine fracture or dislocation.         2.  There is moderate palatine tonsillar 
hypertrophy, likely reactive.                                       ** 
Electronically Signed by NAV Minor **            **            on 
2019 at 1941            **                      Reported and signed by: 
NAV Mullins                    CC: Onur Mendez MD                           
                                           Technologist:George Garrido, RT(R); 
Marisela BEAN  CTDI:        DLP:        Trnscb Date/Time: 2019 () 
[id number]        Orig Print D/T: S: 2019 ()      PAGE  2             
         Signed [id number] 18:52:00





             Test Item    Value        Reference Range Interpretation Comments

 

             ALCOHOL (test code < 0.003 G/dL <0.003                    Ethyl Alc

ohol



             = ALC)                                              Interpretation:



                                                                  0.100 gm/dL   

    -



                                                                 Legally Intoxic

ated



                                                                      0.300-0.40

0 gm/dL



                                                                 - Severely Into

xicated



                                                                         >0.400 

gm/dL



                                                                   - Potentially



                                                                 LethalResults a

re for



                                                                 Medical purpose

s only,



                                                                 and not for Leg

al



                                                                 orEmployment ev

aluation



                                                                 purposes.



CHEMISTRY 8 [id number] 18:37:00





             Test Item    Value        Reference Range Interpretation Comments

 

             ISTAT-SODIUM (test code = NAP)  MMOL/L      134-147                

   

 

             ISTAT-POTASSIUM (test code = KP)  MMOL/L      3.4-5.0              

     

 

             ISTAT-CHLORIDE (test code = CLP)  MMOL/L      100-108              

     

 

             ISTAT CARBON DIOXIDE (test code =  mmol/L      21-33        N      

      



             ISTAT-CO2)                                          

 

             ISTAT CALCIUM IONIZED (test code =  MG/DL       1.12-1.32          

       



             ISTAT-[place])                                          

 

             ISTAT-GLUCOSE (test code = GLUP)  MG/DL              H       

     

 

             ISTAT-BUN (test code = BUNP)  MG/DL       7-18         N           

 

 

             BEDSIDE CREATININE (test code =  MG/DL       0.6-1.3      N        

    



             CREATBED)                                           

 

             GLOMERULAR FILTRATION RATE POC 82 ML/MIN                           

   



             (test code = GFRBED)                                        



CHEMISTRY 8 [id number] 18:37:00





             Test Item    Value        Reference Range Interpretation Comments

 

             ISTAT-SODIUM (test 133 MMOL/L   134-147      L            



             code = NAP)                                         

 

             ISTAT-POTASSIUM (test 4.2 MMOL/L   3.4-5.0      N            



             code = KP)                                          

 

             ISTAT-CHLORIDE (test 101 MMOL/L   100-108      N            Perform

ed by



             code = CLP)                                         certified opera

tor



                                                                 at  [facility]

 

             ISTAT CARBON DIOXIDE 22.0 mmol/L  21-33        N            



             (test code =                                        



             ISTAT-CO2)                                          

 

             ISTAT CALCIUM IONIZED 1.15 MG/DL   1.12-1.32    N            



             (test code =                                        



             ISTAT-CARR[place])                                          

 

             ISTAT-GLUCOSE (test 174 MG/DL           H            



             code = GLUP)                                        

 

             ISTAT-BUN (test code = 9 MG/DL      7-18         N            



             BUNP)                                               

 

             BEDSIDE CREATININE 1.1 MG/DL    0.6-1.3      N            



             (test code = CREATBED)                                        

 

             GLOMERULAR FILTRATION 82 ML/MIN                              



             RATE POC (test code =                                        



             GFRBED)                                             



- XR CHEST 1 -90-89 18:37:00 FAX: Onur Kapoor MD         [phone]
   [place]:   St: REG-------------------------------
------------------------------------------------  Name:   THOM MELISSA      
          [facility]                    : 1985  Age/S: 34/M       
   [address] [place] [address]         Unit#: [id number]      Loc: [place] [address]                 Phys: Onur Mendez MD                            
               Acct: [id number] Dis Date:               Status: REG ER        
                        PHONE #: [phone]     Exam Date:     2019     
1836           FAX #: [phone]     Reason: CHEST PAIN                       
                 EXAMS:                                              CPT CODE:  
   [id number] XR CHEST 1 V             13496                    Single portable 
AP chest               INDICATION: Acute chestpain post injury.               
COMPARISON: 2015 chest radiographs               FINDINGS: Examlimited by 
patient body habitus and portable technique.        The cardiomediastinal 
silhouette is upper limits of normal for size       or mildly enlarged.  The 
lungs are underinflated crowding the   bronchovascular markings but otherwise 
appear clear.  No pleural       effusion identified.  No acute bony finding 
identified.                 IMPRESSION: No acute findings.   SL: SG-H           
        ** Electronically Signed by JED Quiroz **            **       
on 2019 at 1837            **                      Reported and signed by:
Celestino Quiroz M.D.                       CC: Onur Mendez MD                      
                                              Technologist: MIKEL Martins(R)R                                Trnscrd Date/Time/By: 2019 () : 
By: [email or web address]  Orig Print D/T: S: 2019 (1840)                         PAGE
 1                       Signed ReportCBC W/AUTO [id number] 18:36:00





             Test Item    Value        Reference Range Interpretation Comments

 

             WHITE BLOOD CELL (test code = 9.94 x10 3/uL 4.5-11.0     N         

   



             WBC)                                                

 

             RED BLOOD CELL (test code = 4.65 x10 6/uL 4.00-5.60    N           

 



             RBC)                                                

 

             HEMOGLOBIN (test code = HGB) 14.4 g/dL    12.5-16.9    N           

 

 

             HEMATOCRIT (test code = HCT) 41.7 %       37.5-50.7    N           

 

 

             MEAN CELL VOLUME (test code = 89.7 fL      81.0-99.0    N          

  



             MCV)                                                

 

             MEAN CELL HGB (test code = MCH) 31.0 pg      27.0-33.0    N        

    

 

             MEAN CELL HGB CONCETRATION 34.5 g/dL    33.0-37.0    N            



             (test code = MCHC)                                        

 

             RED CELL DISTRIBUTION WIDTH CV 11.9 %       11.5-14.5    N         

   



             (test code = RDW)                                        

 

             RED CELL DISTRIBUTION WIDTH SD 39.2 fL      37.0-54.0    N         

   



             (test code = RDW-SD)                                        

 

             PLATELET COUNT (test code = 214 x10 3/uL 150-400      N            



             PLT)                                                

 

             MEAN PLATELET VOLUME (test code 10.9 fL      7.0-9.0      H        

    



             = MPV)                                              

 

             NEUTROPHIL % (test code = NT%) 54.4 %       56.0-77.0    L         

   

 

             IMMATURE GRANULOCYTE % (test 0.8 %        0.0-2.0      N           

 



             code = IG%)                                         

 

             LYMPHOCYTE % (test code = LY%) 34.4 %       14.0-32.0    H         

   

 

             MONOCYTE % (test code = MO%) 7.1 %        4.8-9.0      N           

 

 

             EOSINOPHIL % (test code = EO%) 2.1 %        0.3-3.7      N         

   

 

             BASOPHIL % (test code = BA%) 1.2 %        0.0-2.0      N           

 

 

             NUCLEATED RBC % (test code = 0.0 %        0-0          N           

 



             NRBC%)                                              

 

             NEUTROPHIL # (test code = NT#) 5.40 x10 3/uL 2.0-7.6      N        

    

 

             IMMATURE GRANULOCYTE # (test 0.08 x10 3/uL 0.00-0.03    H          

  



             code = IG#)                                         

 

             LYMPHOCYTE # (test code = LY#) 3.42 x10 3/uL 1.0-3.8      N        

    

 

             MONOCYTE # (test code = MO#) 0.71 x10 3/uL 0.1-0.8      N          

  

 

             EOSINOPHIL # (test code = EO#) 0.21 x10 3/uL 0.0-0.2      H        

    

 

             BASOPHIL # (test code = BA#) 0.12 x10 3/uL 0.0-0.2      N          

  

 

             NUCLEATED RBC # (test code = 0.00 x10 3/uL 0.0-0.1      N          

  



             NRBC#)                                              

 

             MANUAL DIFF REQUIRED (test code NO                                 

    



             = MDIFF)                                            



COMPREHENSIVE METABOLIC [id number] 11:48:00





             Test Item    Value        Reference Range Interpretation Comments

 

             SODIUM (test code = 138 mmol/L   135-148      N            



             NA)                                                 

 

             POTASSIUM (test code = 4.0 mmol/L   3.5-5.1      N            



             K)                                                  

 

             CHLORIDE (test code = 103 mmol/L   101-109      N            



             CL)                                                 

 

             CARBON DIOXIDE (test 25.3 mmol/L  21-32        N            



             code = CO2)                                         

 

             ANION GAP (test code = 14 mmol/L    10-20        N            



             GAP)                                                

 

             GLUCOSE (test code = 185 mg/dL           H            



             GLU)                                                

 

             BLOOD UREA NITROGEN 12 mg/dL     3-21         N            



             (test code = BUN)                                        

 

             CREATININE (test code 1.32 mg/dL   0.55-1.3     H            



             = CREAT)                                            

 

             BUN/CREATININE RATIO 9.1          10-20        L            



             (test code = BUN/CREA)                                        

 

             TOTAL PROTEIN (test 7.8 g/dL     6.5-8.4      N            



             code = PROT)                                        

 

             ALBUMIN (test code = 4.0 g/dL     3.4-4.8      N            



             ALB)                                                

 

             GLOBULIN (test code = 3.8 G/DL     1-10         N            



             GLOB)                                               

 

             ALBUMIN/GLOBULIN RATIO 1.1 RATIO    0.75-1.50    N            



             (test code = A/G)                                        

 

             CALCIUM (test code = 8.4 mg/dL    8.4-10.2     N            



             CA)                                                 

 

             BILIRUBIN TOTAL (test 0.50 mg/dL   0.0-1.0      N            



             code = BILT)                                        

 

             SGOT/AST (test code = 27 U/L       6-32         N            



             AST)                                                

 

             SGPT/ALT (test code = 53 U/L       12-78        N            Note: 

Change in



             ALT)                                                REFERENCE RANGE

 due



                                                                 to new reagent



                                                                 method.

 

             ALKALINE PHOSPHATASE 55 U/L              N            



             TOTAL (test code =                                        



             ALKP)                                               



[id number] 11:48:00





             Test Item    Value        Reference Range Interpretation Comments

 

             LIPASE (test code = LIP) 168 U/L      128-270      N            



[id number] 11:48:00





             Test Item    Value        Reference Range Interpretation Comments

 

             TROPONIN-I (test code = TROPI) <0.015 ng/mL 0.00-0.056   N         

   



COMPREHENSIVE METABOLIC [id number] 11:23:00





             Test Item    Value        Reference Range Interpretation Comments

 

             SODIUM (test code = 138 mmol/L   135-148      N            



             NA)                                                 

 

             POTASSIUM (test code = 4.0 mmol/L   3.5-5.1      N            



             K)                                                  

 

             CHLORIDE (test code = 103 mmol/L   101-109      N            



             CL)                                                 

 

             CARBON DIOXIDE (test 25.3 mmol/L  21-32        N            



             code = CO2)                                         

 

             ANION GAP (test code = 14 mmol/L    10-20        N            



             GAP)                                                

 

             GLUCOSE (test code = 185 mg/dL           H            



             GLU)                                                

 

             BLOOD UREA NITROGEN 12 mg/dL     3-21         N            



             (test code = BUN)                                        

 

             CREATININE (test code 1.32 mg/dL   0.55-1.3     H            



             = CREAT)                                            

 

             BUN/CREATININE RATIO 9.1          10-20        L            



             (test code = BUN/CREA)                                        

 

             TOTAL PROTEIN (test 7.8 g/dL     6.5-8.4      N            



             code = PROT)                                        

 

             ALBUMIN (test code = 4.0 g/dL     3.4-4.8      N            



             ALB)                                                

 

             GLOBULIN (test code = 3.8 G/DL     1-10         N            



             GLOB)                                               

 

             ALBUMIN/GLOBULIN RATIO 1.1 RATIO    0.75-1.50    N            



             (test code = A/G)                                        

 

             CALCIUM (test code = 8.4 mg/dL    8.4-10.2     N            



             CA)                                                 

 

             BILIRUBIN TOTAL (test 0.50 mg/dL   0.0-1.0      N            



             code = BILT)                                        

 

             SGOT/AST (test code = 27 U/L       6-32         N            



             AST)                                                

 

             SGPT/ALT (test code = 53 U/L       12-78        N            Note: 

Change in



             ALT)                                                REFERENCE RANGE

 due



                                                                 to new reagent



                                                                 method.

 

             ALKALINE PHOSPHATASE 55 U/L              N            



             TOTAL (test code =                                        



             ALKP)                                               



[id number] 11:23:00





             Test Item    Value        Reference Range Interpretation Comments

 

             LIPASE (test code = LIP) 168 U/L      128-270      N            



[id number] 11:23:00





             Test Item    Value        Reference Range Interpretation Comments

 

             TROPONIN-I (test code = TROPI)  ng/mL       0.00-0.056   N         

   



- CT ABD PELVIS W/O [id number] 11:21:00  Name: [facility]     : 1985 Age/S: 33  / M         
[address] [facility]           Unit #: [id number]     Loc:               
[place] [address]                Phys: Elise Batres MD                      
                           Acct: [id number]  Dis Date:               Status: 
REG ER                                  PHONE #: [phone]     Exam Date: 
2019  1107                     FAX #: [phone]      Reason: severe l
eft sided pain radiating to abdomen         EXAMS:                              
                CPTCODE:      [id number] CT ABD PELVIS W/O CONT                  
  [id number]                    HISTORY: Severe left-sided pain radiating to the 
abdomen.               COMPARISON: February 3, 2019.   CT abdomen and pelvis: 
Stone protocol. Automated exposure control.               CT ABDOMEN:         
The lung bases are clear.               Noncontrast liver is within normal 
limits. No discrete mass is noted.       The liver is measuring 25.4 cm in 
length. Gallbladder is without       radiopaque stones.               
Unremarkable spleen. The stomach distended incompletely but it is       normal 
in appearance.               Noncontrast pancreas is unremarkable. Adrenals are 
normal.      Right kidney is free from hydroureteronephrosis. No calyceal 
stones.       Worsening moderate left hydroureteronephrosis from previous exam. 
             No pathologic adenopathy. No bowel obstruction or colitis or       
diverticulitis or enteritis.               CT PELVIS:               Appendix is 
normal. Pelvic bowel loops are unobstructed.                Left hydroureter 
with 4.8 mm obstructing stone in the mid iliac level       noted again is 
unchanged from previous examination with worsening       hydroureter. 
Unremarkable incompletely distended urinary bladder. The       prostate is not 
enlarged. No pelvic pathologic adenopathy. No free       fluid or free air.     
         Subcutaneous tissues and the musculature are normal in appearance. No  
    lytic or blastic lesions are noted within the bony skeleton.                
IMPRESSION:                   Worsening moderate and lefthydroureteronephrosis 
with obstructing         stone measuring 4.8 mm in the distal left ureter at the
mid iliac         level which is also unchanged in position. No calyceal stones.
No         hydroureteronephrosis on the right. Unremarkable incompletely        
distended urinary bladder.     PAGE  1                       Signed Report      
             (CONTINUED)   Name: [facility]     : 1985 Age/S: 33  / M         [address] [facility]       
   Unit #: [id number]     Loc:               [place] [address]                
Phys: Elise Braxton MD                                                  Acct: 
[id number]  Dis Date:      Status: REG ER                                  
PHONE #: [phone]     Exam Date: 2019  1107                     FAX #:
[phone]      Reason: severe left sided pain radiating to abdomen         
EXAMS:                                               CPT CODE:      [id number] CT
ABD PELVIS W/O CONT                     [id number]               &lt;Continued&gt;   
                Normal appendix without bowel obstruction or colitis or 
diverticulitis         or enteritis. Hepatomegaly.  ** Electronically Signed by 
JED Cleary on 2019 at 1121 **                      Reported and 
signed by: Rc Cleary M.D.                                   CC: Elise Batres MD        Technologist:SHEMAR GILLIS, RT(R),CT       CTDI:        
DLP:        Trnscb Date/Time: 2019 (1121) t.SDR.TH4                       
Orig Print D/T: S: 2019 (1124)       CTDI:   DLP:          PAGE  2        
              Signed ReportCOMPREHENSIVE METABOLIC [id number] 11:12:00





             Test Item    Value        Reference Range Interpretation Comments

 

             SODIUM (test code = NA) 138 mmol/L   135-148      N            

 

             POTASSIUM (test code = K) 4.0 mmol/L   3.5-5.1      N            

 

             CHLORIDE (test code = CL) 103 mmol/L   101-109      N            

 

             CARBON DIOXIDE (test code = CO2) 25.3 mmol/L  21-32        N       

     

 

             ANION GAP (test code = GAP) 14 mmol/L    10-20        N            

 

             GLUCOSE (test code = GLU) 185 mg/dL           H            

 

             BLOOD UREA NITROGEN (test code = 12 mg/dL     3-21         N       

     



             BUN)                                                

 

             CREATININE (test code = CREAT) 1.32 mg/dL   0.55-1.3     H         

   

 

             BUN/CREATININE RATIO (test code = 9.1          10-20        L      

      



             BUN/CREA)                                           

 

             TOTAL PROTEIN (test code = PROT)  gram/dL     6.4-8.2              

     

 

             ALBUMIN (test code = ALB)  g/dL        3.4-5.0                   

 

             GLOBULIN (test code = GLOB)  g/dL        2.7-4.2                   

 

             ALBUMIN/GLOBULIN RATIO (test code              0.75-1.50           

      



             = A/G)                                              

 

             CALCIUM (test code = CA) 8.4 mg/dL    8.4-10.2     N            

 

             BILIRUBIN TOTAL (test code =  mg/dL       0.2-1.2                  

 



             BILT)                                               

 

             SGOT/AST (test code = AST)  IUnit/L     15-37                     

 

             SGPT/ALT (test code = ALT)  U/L         10-69                     

 

             ALKALINE PHOSPHATASE TOTAL (test  IUnit/L                    

     



             code = ALKP)                                        



[id number] 11:12:00





             Test Item    Value        Reference Range Interpretation Comments

 

             LIPASE (test code = LIP)  Unit/L      144-286                   



[id number] 11:12:00





             Test Item    Value        Reference Range Interpretation Comments

 

             TROPONIN-I (test code = TROPI)  ng/mL       0-0.045                

   



URINALYSIS [id number] 11:04:00





             Test Item    Value        Reference Range Interpretation Comments

 

             UA COLOR (test code STRAW        YELLOW                    



             = COLU)                                             

 

             UA APPEARANCE (test CLEAR        CLEAR                     



             code = APPU)                                        

 

             UA GLUCOSE DIPSTICK NORMAL mg/dL NEGATIVE                  



             (test code = DGLUU)                                        

 

             UA BILIRUBIN NEGATIVE mg/dL NEGATIVE                  



             DIPSTICK (test code                                        



             = BILU)                                             

 

             UA KETONE DIPSTICK neg mg/dL    NEGATIVE                  



             (test code = KETU)                                        

 

             UA SPECIFIC GRAVITY 1.010        1.001-1.035               



             (test code = SGU)                                        

 

             UA BLOOD DIPSTICK 150 (3+) Gume/uL NEGATIVE     A            



             (test code = INDIO)                                        

 

             UA PH DIPSTICK (test 5.0          5.0-8.0                   



             code = GERRY)                                         

 

             UA PROTEIN DIPSTICK neg mg/dL    Neg-15                    



             (test code = PROU)                                        

 

             UA UROBILINIOGEN norm mg/dL   0.0-0.2                   



             DIPSTICK (test code                                        



             = URO)                                              

 

             UA NITRITE DIPSTICK NEGATIVE     NEGATIVE                  



             (test code = KRISTIN)                                        

 

             UA LEUKOCYTE NEGATIVE uL  NEGATIVE                  



             ESTERASE DIPSTICK                                        



             (test code = LEUU)                                        

 

             UA WBC (test code = 0-5 per HPF  0-5                       IN SOME 

URINARY



             WBCU)                                               TRACT INFECTION

S



                                                                 THERE MAY NOT B

E



                                                                 ENOUGHWBCs IN T

HE



                                                                 URINE TO TRIGGE

R AN



                                                                 AUTOMATIC (REFL

EX)



                                                                 URINECULTURE. A



                                                                 SEPERATE ORDER 

FOR



                                                                 URINE CULTURE I

S



                                                                 RECOMMENDEDIF T

HERE



                                                                 IS STRONG SUPPO

RT



                                                                 FOR A URINARY T

RACT



                                                                 INFECTIONCLINIC

ALLY



                                                                 .

 

             UA RBC (test code = 6-10 per HPF 0-5                       



             RBCU)                                               

 

             UA EPITHELIAL CELLS Rare (0-1/hpf) Few                       



             (test code = EPIU) per HPF                                

 

             UA BACTERIA (test TRACE per HPF NONE                      



             code = BACU)                                        

 

             UA MUCUS (test code FEW per LPF  NONE-FEW                  



             = MUCU)                                             



Urine Source? Clean CatchDRUGS OF ABUSE SCREEN [id number] 11:04:00





             Test Item    Value        Reference Range Interpretation Comments

 

             URN COCAINE (test code = COCAURN) NEGATIVE     NEGATIVE            

      

 

             URN CANNABINOIDS (test code = NEGATIVE     NEGATIVE                

  



             CANNABURN)                                          

 

             URN AMPHETAMINE (test code = NEGATIVE     NEGATIVE                 

 



             AMPHETURN)                                          

 

             URN BARBITURATE (test code = NEGATIVE     NEGATIVE                 

 



             BARBITURN)                                          

 

             URN BENZODIAZEPINE (test code = NEGATIVE     NEGATIVE              

    



             BENZOURN)                                           

 

             URN OPIATES (test code = OPIATURN) NEGATIVE     NEGATIVE           

       

 

             URN PHENCYCLIDINE (PCP) (test code = NEGATIVE     NEGATIVE         

         



             PHENCURN)                                           



Urine Source? Clean CatchCBC W/AUTO [id number] 11:00:00





             Test Item    Value        Reference Range Interpretation Comments

 

             WHITE BLOOD CELL (test code = 7.7 K/mm3    4.5-12.5     N          

  



             WBC)                                                

 

             RED BLOOD CELL (test code = 4.26 mill/mm3 4.0-5.8      N           

 



             RBC)                                                

 

             HEMOGLOBIN (test code = HGB) 13.1 gram/dL 13.0-17.5    N           

 

 

             HEMATOCRIT (test code = HCT) 38.5 %       42.0-52.0    L           

 

 

             MEAN CELL VOLUME (test code = 90.4 fL      80-98        N          

  



             MCV)                                                

 

             MEAN CELL HGB (test code = MCH) 30.8 picogram 27.0-33.0    N       

     

 

             MEAN CELL HGB CONCETRATION 34.0 gram/dL 33.0-36.0    N            



             (test code = MCHC)                                        

 

             RED CELL DISTRIBUTION WIDTH 12.3 %       11.6-16.2    N            



             (test code = RDW)                                        

 

             RED CELL DISTRIBUTION WIDTH SD 39.8 fL      39.2-49.5    N         

   



             (test code = RDW-SD)                                        

 

             PLATELET COUNT (test code = 178 K/mm3    150-450      N            



             PLT)                                                

 

             MEAN PLATELET VOLUME (test code 10.6 fL      6.7-11.0     N        

    



             = MPV)                                              

 

             NEUTROPHIL % (test code = NT%) 53.9 %       39.0-69.0    N         

   

 

             LYMPHOCYTE % (test code = LY%) 33.6 %       25.0-55.0    N         

   

 

             MONOCYTE % (test code = MO%) 7.8 %        0.0-10.0     N           

 

 

             EOSINOPHIL % (test code = EO%) 3.9 %        0.0-5.0      N         

   

 

             BASOPHIL % (test code = BA%) 0.8 %        0.0-1.0      N           

 

 

             NEUTROPHIL # (test code = NT#) 4.17 K/mm3   1.8-7.7      N         

   

 

             LYMPHOCYTE # (test code = LY#) 2.60 K/mm3   1.0-5.0      N         

   

 

             MONOCYTE # (test code = MO#) 0.60 K/mm3   0-0.8        N           

 

 

             EOSINOPHIL # (test code = EO#) 0.30 K/mm3   0.0-0.5      N         

   

 

             BASOPHIL # (test code = BA#) 0.06 K/mm3   0.0-0.2      N           

 

 

             MANUAL DIFF REQUIRED (test code NO                                 

    



             = MDIFF)                                            



URINALYSIS [id number] 10:59:00





             Test Item    Value        Reference Range Interpretation Comments

 

             UA COLOR (test code STRAW        YELLOW                    



             = COLU)                                             

 

             UA APPEARANCE (test CLEAR        CLEAR                     



             code = APPU)                                        

 

             UA GLUCOSE DIPSTICK NORMAL mg/dL NEGATIVE                  



             (test code = DGLUU)                                        

 

             UA BILIRUBIN NEGATIVE mg/dL NEGATIVE                  



             DIPSTICK (test code                                        



             = BILU)                                             

 

             UA KETONE DIPSTICK neg mg/dL    NEGATIVE                  



             (test code = KETU)                                        

 

             UA SPECIFIC GRAVITY 1.010        1.001-1.035               



             (test code = SGU)                                        

 

             UA BLOOD DIPSTICK 150 (3+) Gume/uL NEGATIVE     A            



             (test code = INDIO)                                        

 

             UA PH DIPSTICK (test 5.0          5.0-8.0                   



             code = GERRY)                                         

 

             UA PROTEIN DIPSTICK neg mg/dL    Neg-15                    



             (test code = PROU)                                        

 

             UA UROBILINIOGEN norm mg/dL   0.0-0.2                   



             DIPSTICK (test code                                        



             = URO)                                              

 

             UA NITRITE DIPSTICK NEGATIVE     NEGATIVE                  



             (test code = KRISTIN)                                        

 

             UA LEUKOCYTE NEGATIVE uL  NEGATIVE                  



             ESTERASE DIPSTICK                                        



             (test code = LEUU)                                        

 

             UA WBC (test code = 0-5 per HPF  0-5                       IN SOME 

URINARY



             WBCU)                                               TRACT INFECTION

S



                                                                 THERE MAY NOT B

E



                                                                 ENOUGHWBCs IN T

HE



                                                                 URINE TO TRIGGE

R AN



                                                                 AUTOMATIC (REFL

EX)



                                                                 URINECULTURE. A



                                                                 SEPERATE ORDER 

FOR



                                                                 URINE CULTURE I

S



                                                                 RECOMMENDEDIF T

HERE



                                                                 IS STRONG SUPPO

RT



                                                                 FOR A URINARY T

RACT



                                                                 INFECTIONCLINIC

ALLY



                                                                 .

 

             UA RBC (test code = 6-10 per HPF 0-5                       



             RBCU)                                               

 

             UA EPITHELIAL CELLS Rare (0-1/hpf) Few                       



             (test code = EPIU) per HPF                                

 

             UA BACTERIA (test TRACE per HPF NONE                      



             code = BACU)                                        

 

             UA MUCUS (test code FEW per LPF  NONE-FEW                  



             = MUCU)                                             



Urine Source? Clean CatchDRUGS OF ABUSE SCREEN [id number] 10:59:00





             Test Item    Value        Reference Range Interpretation Comments

 

             URN COCAINE (test code = COCAURN)              NEGATIVE            

      

 

             URN CANNABINOIDS (test code =              NEGATIVE                

  



             CANNABURN)                                          

 

             URN AMPHETAMINE (test code = AMPHETURN)              NEGATIVE      

            

 

             URN BARBITURATE (test code = BARBITURN)              NEGATIVE      

            

 

             URN BENZODIAZEPINE (test code =              NEGATIVE              

    



             BENZOURN)                                           

 

             URN OPIATES (test code = OPIATURN)              NEGATIVE           

       

 

             URN PHENCYCLIDINE (PCP) (test code =              NEGATIVE         

         



             PHENCURN)                                           



Urine Source? Clean CatchURINALYSIS [id number] 10:56:00





             Test Item    Value        Reference Range Interpretation Comments

 

             UA COLOR (test code = COLU) STRAW        YELLOW                    

 

             UA APPEARANCE (test code = CLEAR        CLEAR                     



             APPU)                                               

 

             UA GLUCOSE DIPSTICK (test NORMAL mg/dL NEGATIVE                  



             code = DGLUU)                                        

 

             UA BILIRUBIN DIPSTICK (test NEGATIVE mg/dL NEGATIVE                

  



             code = BILU)                                        

 

             UA KETONE DIPSTICK (test code neg mg/dL    NEGATIVE                

  



             = KETU)                                             

 

             UA SPECIFIC GRAVITY (test 1.010        1.001-1.035               



             code = SGU)                                         

 

             UA BLOOD DIPSTICK (test code 150 (3+) Gume/uL NEGATIVE     A        

    



             = INDIO)                                              

 

             UA PH DIPSTICK (test code = 5.0          5.0-8.0                   



             GERRY)                                                

 

             UA PROTEIN DIPSTICK (test neg mg/dL    Neg-15                    



             code = PROU)                                        

 

             UA UROBILINIOGEN DIPSTICK norm mg/dL   0.0-0.2                   



             (test code = URO)                                        

 

             UA NITRITE DIPSTICK (test NEGATIVE     NEGATIVE                  



             code = KRISTIN)                                        

 

             UA LEUKOCYTE ESTERASE NEGATIVE uL  NEGATIVE                  



             DIPSTICK (test code = LEUU)                                        

 

             UA WBC (test code = WBCU)  per HPF     0-5                       



Urine Source? Clean CatchDRUGS OF ABUSE SCREEN [id number] 10:56:00





             Test Item    Value        Reference Range Interpretation Comments

 

             URN COCAINE (test code = COCAURN)              NEGATIVE            

      

 

             URN CANNABINOIDS (test code =              NEGATIVE                

  



             CANNABURN)                                          

 

             URN AMPHETAMINE (test code = AMPHETURN)              NEGATIVE      

            

 

             URN BARBITURATE (test code = BARBITURN)              NEGATIVE      

            

 

             URN BENZODIAZEPINE (test code =              NEGATIVE              

    



             BENZOURN)                                           

 

             URN OPIATES (test code = OPIATURN)              NEGATIVE           

       

 

             URN PHENCYCLIDINE (PCP) (test code =              NEGATIVE         

         



             PHENCURN)                                           



Urine Source? Clean CatchURINALYSIS [id number] 20:23:00





             Test Item    Value        Reference Range Interpretation Comments

 

             UA COLOR (test code LIGHT YELLOW YELLOW                    



             = COLU)                                             

 

             UA APPEARANCE (test HAZY         CLEAR        A            



             code = APPU)                                        

 

             UA GLUCOSE DIPSTICK 50 (Trace)   NEGATIVE     A            



             (test code = DGLUU) mg/dL                                  

 

             UA BILIRUBIN NEGATIVE mg/dL NEGATIVE                  



             DIPSTICK (test code                                        



             = BILU)                                             

 

             UA KETONE DIPSTICK neg mg/dL    NEGATIVE                  



             (test code = KETU)                                        

 

             UA SPECIFIC GRAVITY 1.020        1.001-1.035               



             (test code = SGU)                                        

 

             UA BLOOD DIPSTICK 250 (4+) Gume/uL NEGATIVE     A            



             (test code = INDIO)                                        

 

             UA PH DIPSTICK (test 5.0          5.0-8.0                   



             code = GERRY)                                         

 

             UA PROTEIN DIPSTICK 100 (2+) mg/dL Neg-15       A            



             (test code = PROU)                                        

 

             UA UROBILINIOGEN norm mg/dL   0.0-0.2                   



             DIPSTICK (test code                                        



             = URO)                                              

 

             UA NITRITE DIPSTICK NEGATIVE     NEGATIVE                  



             (test code = KRISTIN)                                        

 

             UA LEUKOCYTE 25 (Trace) uL NEGATIVE     A            



             ESTERASE DIPSTICK                                        



             (test code = LEUU)                                        

 

             UA WBC (test code = 0-5 per HPF  0-5                       IN SOME 

URINARY



             WBCU)                                               TRACT INFECTION

S



                                                                 THERE MAY NOT B

E



                                                                 ENOUGHWBCs IN T

HE



                                                                 URINE TO TRIGGE

R AN



                                                                 AUTOMATIC (REFL

EX)



                                                                 URINECULTURE. A



                                                                 SEPERATE ORDER 

FOR



                                                                 URINE CULTURE I

S



                                                                 RECOMMENDEDIF T

HERE



                                                                 IS STRONG SUPPO

RT



                                                                 FOR A URINARY T

RACT



                                                                 INFECTIONCLINIC

ALLY



                                                                 .

 

             UA RBC (test code = 25-50 per HPF 0-5          A            



             RBCU)                                               

 

             UA EPITHELIAL CELLS Rare (0-1/hpf) Few                       



             (test code = EPIU) per HPF                                

 

             UA BACTERIA (test TRACE per HPF NONE                      



             code = BACU)                                        

 

             UA URIC ACID MANY per LPF NONE         A            



             CRYSTALS (test code                                        



             = URIU)                                             

 

             UA TRIPLE PHOSPHATE MODERATE per NONE         A            



             CRYSTALS (test code LPF                                    



             = TRPHOSU)                                          



Urine Source? Clean CatchURINALYSIS [id number] 20:09:00





             Test Item    Value        Reference Range Interpretation Comments

 

             UA COLOR (test code = COLU) LIGHT YELLOW YELLOW                    

 

             UA APPEARANCE (test code = HAZY         CLEAR        A            



             APPU)                                               

 

             UA GLUCOSE DIPSTICK (test 50 (Trace) mg/dL NEGATIVE     A          

  



             code = DGLUU)                                        

 

             UA BILIRUBIN DIPSTICK (test NEGATIVE mg/dL NEGATIVE                

  



             code = BILU)                                        

 

             UA KETONE DIPSTICK (test neg mg/dL    NEGATIVE                  



             code = KETU)                                        

 

             UA SPECIFIC GRAVITY (test 1.020        1.001-1.035               



             code = SGU)                                         

 

             UA BLOOD DIPSTICK (test code 250 (4+) Gume/uL NEGATIVE     A        

    



             = INDIO)                                              

 

             UA PH DIPSTICK (test code = 5.0          5.0-8.0                   



             GERRY)                                                

 

             UA PROTEIN DIPSTICK (test 100 (2+) mg/dL Neg-15       A            



             code = PROU)                                        

 

             UA UROBILINIOGEN DIPSTICK norm mg/dL   0.0-0.2                   



             (test code = URO)                                        

 

             UA NITRITE DIPSTICK (test NEGATIVE     NEGATIVE                  



             code = KRISTIN)                                        

 

             UA LEUKOCYTE ESTERASE 25 (Trace) uL NEGATIVE     A            



             DIPSTICK (test code = LEUU)                                        

 

             UA WBC (test code = WBCU)  per HPF     0-5                       



Urine Source? Clean CatchURINALYSIS [id number] 13:10:00





             Test Item    Value        Reference Range Interpretation Comments

 

             UA COLOR (test code YELLOW       YELLOW                    



             = COLU)                                             

 

             UA APPEARANCE (test CLEAR        CLEAR                     



             code = APPU)                                        

 

             UA GLUCOSE DIPSTICK NORMAL mg/dL NEGATIVE                  



             (test code = DGLUU)                                        

 

             UA BILIRUBIN NEGATIVE mg/dL NEGATIVE                  



             DIPSTICK (test code                                        



             = BILU)                                             

 

             UA KETONE DIPSTICK neg mg/dL    NEGATIVE                  



             (test code = KETU)                                        

 

             UA SPECIFIC GRAVITY 1.025        1.001-1.035               



             (test code = SGU)                                        

 

             UA BLOOD DIPSTICK 250 (4+) Gume/uL NEGATIVE     A            



             (test code = INDIO)                                        

 

             UA PH DIPSTICK (test 5.0          5.0-8.0                   



             code = GERRY)                                         

 

             UA PROTEIN DIPSTICK 100 (2+) mg/dL Neg-15       A            



             (test code = PROU)                                        

 

             UA UROBILINIOGEN norm mg/dL   0.0-0.2                   



             DIPSTICK (test code                                        



             = URO)                                              

 

             UA NITRITE DIPSTICK NEGATIVE     NEGATIVE                  



             (test code = KRISTIN)                                        

 

             UA LEUKOCYTE 25 (Trace) uL NEGATIVE     A            



             ESTERASE DIPSTICK                                        



             (test code = LEUU)                                        

 

             UA WBC (test code = 0-5 per HPF  0-5                       IN SOME 

URINARY



             WBCU)                                               TRACT INFECTION

S



                                                                 THERE MAY NOT B

E



                                                                 ENOUGHWBCs IN T

HE



                                                                 URINE TO TRIGGE

R AN



                                                                 AUTOMATIC (REFL

EX)



                                                                 URINECULTURE. A



                                                                 SEPERATE ORDER 

FOR



                                                                 URINE CULTURE I

S



                                                                 RECOMMENDEDIF T

HERE



                                                                 IS STRONG SUPPO

RT



                                                                 FOR A URINARY T

RACT



                                                                 INFECTIONCLINIC

ALLY



                                                                 .

 

             UA RBC (test code = 11-20 per HPF 0-5                       



             RBCU)                                               

 

             UA EPITHELIAL CELLS None seen per Few                       



             (test code = EPIU) HPF                                    

 

             UA BACTERIA (test FEW per HPF  NONE                      



             code = BACU)                                        

 

             UA URIC ACID MANY per LPF NONE                      



             CRYSTALS (test code                                        



             = URIU)                                             



URINALYSIS W/O [id number] 13:10:00





             Test Item    Value        Reference Range Interpretation Comments

 

             UA MICROSCOPIC NEEDED? (test code = YES                            

        



             UAMICRO)                                            



URINALYSIS [id number] 13:05:00





             Test Item    Value        Reference Range Interpretation Comments

 

             UA COLOR (test code = COLU) YELLOW       YELLOW                    

 

             UA APPEARANCE (test code = CLEAR        CLEAR                     



             APPU)                                               

 

             UA GLUCOSE DIPSTICK (test NORMAL mg/dL NEGATIVE                  



             code = DGLUU)                                        

 

             UA BILIRUBIN DIPSTICK (test NEGATIVE mg/dL NEGATIVE                

  



             code = BILU)                                        

 

             UA KETONE DIPSTICK (test code neg mg/dL    NEGATIVE                

  



             = KETU)                                             

 

             UA SPECIFIC GRAVITY (test 1.025        1.001-1.035               



             code = SGU)                                         

 

             UA BLOOD DIPSTICK (test code 250 (4+) Gume/uL NEGATIVE     A        

    



             = INDIO)                                              

 

             UA PH DIPSTICK (test code = 5.0          5.0-8.0                   



             GERRY)                                                

 

             UA PROTEIN DIPSTICK (test 100 (2+) mg/dL Neg-15       A            



             code = PROU)                                        

 

             UA UROBILINIOGEN DIPSTICK norm mg/dL   0.0-0.2                   



             (test code = URO)                                        

 

             UA NITRITE DIPSTICK (test NEGATIVE     NEGATIVE                  



             code = KRISTIN)                                        

 

             UA LEUKOCYTE ESTERASE 25 (Trace) uL NEGATIVE     A            



             DIPSTICK (test code = LEUU)                                        

 

             UA WBC (test code = WBCU)  per HPF     0-5                       



URINALYSIS W/O [id number] 13:05:00





             Test Item    Value        Reference Range Interpretation Comments

 

             UA MICROSCOPIC NEEDED? (test code = YES                            

        



             UAMICRO)                                            



URINALYSIS [id number] 13:05:00





             Test Item    Value        Reference Range Interpretation Comments

 

             UA COLOR (test code = COLU) YELLOW       YELLOW                    

 

             UA APPEARANCE (test code = CLEAR        CLEAR                     



             APPU)                                               

 

             UA GLUCOSE DIPSTICK (test NORMAL mg/dL NEGATIVE                  



             code = DGLUU)                                        

 

             UA BILIRUBIN DIPSTICK (test NEGATIVE mg/dL NEGATIVE                

  



             code = BILU)                                        

 

             UA KETONE DIPSTICK (test code neg mg/dL    NEGATIVE                

  



             = KETU)                                             

 

             UA SPECIFIC GRAVITY (test 1.025        1.001-1.035               



             code = SGU)                                         

 

             UA BLOOD DIPSTICK (test code 250 (4+) Gume/uL NEGATIVE     A        

    



             = INDIO)                                              

 

             UA PH DIPSTICK (test code = 5.0          5.0-8.0                   



             GERRY)                                                

 

             UA PROTEIN DIPSTICK (test 100 (2+) mg/dL Neg-15       A            



             code = PROU)                                        

 

             UA UROBILINIOGEN DIPSTICK norm mg/dL   0.0-0.2                   



             (test code = URO)                                        

 

             UA NITRITE DIPSTICK (test NEGATIVE     NEGATIVE                  



             code = KRISTIN)                                        

 

             UA LEUKOCYTE ESTERASE 25 (Trace) uL NEGATIVE     A            



             DIPSTICK (test code = LEUU)                                        

 

             UA WBC (test code = WBCU)  per HPF     0-5                       



URINALYSIS W/O [id number] 13:05:00





             Test Item    Value        Reference Range Interpretation Comments

 

             UA MICROSCOPIC NEEDED? (test code = YES                            

        



             UAMICRO)                                            



[id number] 12:37:00





             Test Item    Value        Reference Range Interpretation Comments

 

             LIPASE (test code = LIP) 214 U/L      128-270      N            



[id number] 12:37:00





             Test Item    Value        Reference Range Interpretation Comments

 

             MAGNESIUM (test code = MAG) 1.9 mg/dL    1.6-2.3      N            



[id number] 12:37:00





             Test Item    Value        Reference Range Interpretation Comments

 

             TROPONIN-I (test code = TROPI) <0.015 ng/mL 0.00-0.056   N         

   



COMPREHENSIVE METABOLIC [id number] 12:30:00





             Test Item    Value        Reference Range Interpretation Comments

 

             SODIUM (test code = 138 mmol/L   135-148      N            



             NA)                                                 

 

             POTASSIUM (test code = 4.1 mmol/L   3.5-5.1      N            



             K)                                                  

 

             CHLORIDE (test code = 100 mmol/L   101-109      L            



             CL)                                                 

 

             CARBON DIOXIDE (test 23.4 mmol/L  21-32        N            



             code = CO2)                                         

 

             ANION GAP (test code = 19 mmol/L    10-20        N            



             GAP)                                                

 

             GLUCOSE (test code = 177 mg/dL           H            



             GLU)                                                

 

             BLOOD UREA NITROGEN 18 mg/dL     3-21         N            



             (test code = BUN)                                        

 

             CREATININE (test code 1.79 mg/dL   0.55-1.3     H            



             = CREAT)                                            

 

             BUN/CREATININE RATIO 10.1         10-20        N            



             (test code = BUN/CREA)                                        

 

             TOTAL PROTEIN (test 8.4 g/dL     6.5-8.4      N            



             code = PROT)                                        

 

             ALBUMIN (test code = 4.2 g/dL     3.4-4.8      N            



             ALB)                                                

 

             GLOBULIN (test code = 4.2 G/DL     1-10         N            



             GLOB)                                               

 

             ALBUMIN/GLOBULIN RATIO 1.0 RATIO    0.75-1.50    N            



             (test code = A/G)                                        

 

             CALCIUM (test code = 8.2 mg/dL    8.4-10.2     L            



             CA)                                                 

 

             BILIRUBIN TOTAL (test 0.40 mg/dL   0.0-1.0      N            



             code = BILT)                                        

 

             SGOT/AST (test code = 52 U/L       6-32         H            



             AST)                                                

 

             SGPT/ALT (test code = 75 U/L       12-78        N            Note: 

Change in



             ALT)                                                REFERENCE RANGE

 due



                                                                 to new reagent



                                                                 method.

 

             ALKALINE PHOSPHATASE 69 U/L              N            



             TOTAL (test code =                                        



             ALKP)                                               



[id number] 12:20:00





             Test Item    Value        Reference Range Interpretation Comments

 

             D-DIMER (test code = DDIMER) < 100 ng/ml  < 600                    

 



- CT ABD PELVIS W/O [id number] 12:16:00  [facility] 1985 Age/S: 33  / M         
[facility]           Unit #: [id number]     Loc:               
[place] [address]                Phys: Tommie Das MD                  
                           Acct: [id number]  Dis Date:               Status: 
REG ER                                  PHONE #: [phone]     Exam Date: 
2019  1206                     FAX #: [phone]      Reason: L flank
pain, h/o kidney stone                      EXAMS:                              
                CPTCODE:      [id number] CT ABD PELVIS W/O CONT                  
  [address]                    HISTORY: Left flank pain, h/o kidney stone           
   TECHNIQUE:  5mm axial CT images were obtained through the abdomen and       
pelvis without contrast. Sagittal and coronal reformatted images were       
generated. Automated exposure control for dose reduction.                
COMPARISON: 18 FINDINGS:                Mild right basilar dependent 
subsegmental atelectasis. Normal heart       size.               Hepatomegaly 
with diffuse fatty infiltration. Nonenhanced gallbladder,       pancreas, 
spleen, and adrenal glands are unremarkable.               Obstructing 3 mm left
ureteral calculus at the level of the iliac       vessels; mild left 
hydronephrosis and renal parenchymal edema.           Nonenhanced right kidney 
and collecting system unremarkable.               Limited evaluation the GI 
tract without oral contrast. Stomach, small       bowel, appendix, and colon are
unremarkable.               No free air or free fluid. No lymphadenopathy. 
Abdominal aorta is       normal incaliber.               Urinary bladder is 
unremarkable. Seminal vesicles and prostate gland       are unremarkable. No 
pelvic free fluid.               Mild degenerative changes of the spine, 
sacroiliac joints, and hips.                         IMPRESSION:                
   Obstructing 3 mm left ureteral calculus at the level of the iliac         
vessels; mild left hydronephrosis and renal parenchymal edema                   
          ** Electronically Signed by Adwoa Reyes D.O. on 2019 at 1216 **   
                  Reported and signed by: Adwoa Reyes D.O.       PAGE  1          
            Signed Report                    (CONTINUED)   Name: 
THOM MELISSA [facility]     : 
1985 Age/S: 33  / M         [facility]           Unit #: [id number]
[id number]     Loc:               [place] [address]                Phys: 
Tommie Das MD                                       Acct: [id number] 
Dis Date:               Status: REG ER                              PHONE #: 
[phone]     Exam Date: 2019  1208   FAX #: [phone]      Reason: 
L flank pain, h/o kidney stone                      EXAMS:                      
                 CPT CODE:      [id number] CT ABD PELVIS W/O CONT       [address]    
          &lt;Continued&gt;                                       CC: 
Tommie Das MD                     Technologist:Alfreda Muro             
       CTDI:        DLP:        Trnscb Date/Time: 2019 (7356) t.[id number].[email or web address]  
                    Orig Print D/T: S: 2019 (3320) CTDI:          DLP:    
     PAGE  2                       Signed ReportCBC W/AUTO [id number] 
12:07:00





             Test Item    Value        Reference Range Interpretation Comments

 

             WHITE BLOOD CELL (test code = 8.0 K/mm3    4.5-12.5     N          

  



             WBC)                                                

 

             RED BLOOD CELL (test code = 4.73 mill/mm3 4.0-5.8      N           

 



             RBC)                                                

 

             HEMOGLOBIN (test code = HGB) 14.7 gram/dL 13.0-17.5    N           

 

 

             HEMATOCRIT (test code = HCT) 42.4 %       42.0-52.0    N           

 

 

             MEAN CELL VOLUME (test code = 89.6 fL      80-98        N          

  



             MCV)                                                

 

             MEAN CELL HGB (test code = MCH) 31.1 picogram 27.0-33.0    N       

     

 

             MEAN CELL HGB CONCETRATION 34.7 gram/dL 33.0-36.0    N            



             (test code = MCHC)                                        

 

             RED CELL DISTRIBUTION WIDTH 12.5 %       11.6-16.2    N            



             (test code = RDW)                                        

 

             RED CELL DISTRIBUTION WIDTH SD 40.2 fL      39.2-49.5    N         

   



             (test code = RDW-SD)                                        

 

             PLATELET COUNT (test code = 183 K/mm3    150-450      N            



             PLT)                                                

 

             MEAN PLATELET VOLUME (test code 10.7 fL      6.7-11.0     N        

    



             = MPV)                                              

 

             NEUTROPHIL % (test code = NT%) 55.3 %       39.0-69.0    N         

   

 

             LYMPHOCYTE % (test code = LY%) 30.6 %       25.0-55.0    N         

   

 

             MONOCYTE % (test code = MO%) 10.9 %       0.0-10.0     H           

 

 

             EOSINOPHIL % (test code = EO%) 2.9 %        0.0-5.0      N         

   

 

             BASOPHIL % (test code = BA%) 0.3 %        0.0-1.0      N           

 

 

             NEUTROPHIL # (test code = NT#) 4.41 K/mm3   1.8-7.7      N         

   

 

             LYMPHOCYTE # (test code = LY#) 2.44 K/mm3   1.0-5.0      N         

   

 

             MONOCYTE # (test code = MO#) 0.87 K/mm3   0-0.8        H           

 

 

             EOSINOPHIL # (test code = EO#) 0.23 K/mm3   0.0-0.5      N         

   

 

             BASOPHIL # (test code = BA#) 0.02 K/mm3   0.0-0.2      N           

 

 

             MANUAL DIFF REQUIRED (test code NO                                 

    



             = MDIFF)

## 2022-03-25 NOTE — ER
Nurse's Notes                                                                                     

 [facility]                                                                 

Name: Sohail Cortezizer                                                                            

Age: 36 yrs                                                                                       

Sex: Male                                                                                         

: 1985                                                                                   

MRN: [id number]                                                                                   

Arrival Date: 2022                                                                          

Time: 10:01                                                                                       

Account#: [id number]                                                                            

Bed 20                                                                                            

Private MD:                                                                                       

Diagnosis: Encounter for examination and observation for other specified reasons-work excuse      

                                                                                                  

Presentation:                                                                                     

                                                                                             

10:17 Chief complaint: Patient states: L sided flank pain radiating to LLQ, N/V that started  ph  

      at 0400 this morning, reports hx of kidney stones and reports that the pain is the          

      same, states, " I've had like 50 of these things and have passed them all." Denies          

      fever or difficulty urinating. Coronavirus screen: Vaccine status: Patient reports          

      being unvaccinated. Ebola Screen: No symptoms or risks identified at this time. Initial     

      Sepsis Screen: Does the patient meet any 2 criteria? No. Patient's initial sepsis           

      screen is negative. Does the patient have a suspected source of infection? No.              

      Patient's initial sepsis screen is negative. Risk Assessment: Do you want to hurt           

      yourself or someone else? Patient reports no desire to harm self or others.                 

10:17 Method Of Arrival: Ambulatory                                                           ph  

10:20 Onset of symptoms was 2022.                                                   ph  

10:20 Acuity: ANUSHA 3                                                                           ph  

                                                                                                  

Triage Assessment:                                                                                

10:22 Pain: Complains of pain in anterior aspect of left lateral abdomen and posterior aspect ph  

      of left lateral abdomen Pain radiates to left lower quadrant Pain currently is 7 out of     

      10 on a pain scale. Neuro: Level of Consciousness is awake, alert, obeys commands,          

      Oriented to person, place, time, situation. GI: Reports lower abdominal pain, nausea.       

      : Reports pain in left flank(s), lower quadrant(s). Musculoskeletal: Circulation,         

      motion, and sensation intact. Range of motion: intact in all extremities.                   

                                                                                                  

Historical:                                                                                       

- Allergies:                                                                                      

10:21 No Known Allergies;                                                                     ph  

- PMHx:                                                                                           

10:21 kidney stones;                                                                          ph  

                                                                                                  

- Immunization history:: Adult Immunizations unknown.                                             

- Social history:: Smoking status: Patient reports the use of cigarette tobacco                   

  products, smokes one pack cigarettes per day.                                                   

                                                                                                  

                                                                                                  

Screening:                                                                                        

10:25 Abuse screen: Denies threats or abuse. Nutritional screening: No deficits noted.        ag7 

      Tuberculosis screening: No symptoms or risk factors identified. Fall Risk No fall in        

      past 12 months (0 pts). No secondary diagnosis (0 pts). No IV (0 pts). Ambulatory Aid-      

      None/Bed Rest/Nurse Assist (0 pts). Gait- Normal/Bed Rest/Wheelchair (0 pts) Mental         

      Status- Oriented to own ability (0 pts). Total Blanton Fall Scale indicates No Risk (0-24     

      pts).                                                                                       

                                                                                                  

Assessment:                                                                                       

10:20 Reassessment: ERP at bedside to assess pt, pt states that he does not wish to have CT,  ph  

      IV, ort pain medications at this time, states, " I've passed so many of these things I      

      know that's what it is. I can pass it at home on my own. I just drink a lot of water        

      and take Tylenol. I just need a note for work because I had to take off today.".            

10:20 General: Appears in no apparent distress. comfortable, well groomed, Behavior is calm,  ag7 

      cooperative, appropriate for age. Pain: Complains of pain in left lower quadrant Pain       

      does not radiate. Pain currently is 7 out of 10 on a pain scale. Quality of pain is         

      described as sharp, Pain began suddenly, Is continuous, Alleviated by. Neuro: Level of      

      Consciousness is awake, alert, obeys commands, Oriented to person, place, time,             

      situation, Appropriate for age. Cardiovascular: Heart tones S1 S2 present Capillary         

      refill < 3 seconds Patient's skin is warm and dry. Respiratory: Airway is patent            

      Trachea midline Respiratory effort is even, unlabored, Respiratory pattern is regular,      

      symmetrical, Breath sounds are clear bilaterally. GI: Bowel sounds present X 4 quads.       

      Abd is soft and non tender X 4 quads. Reports lower abdominal pain. : Reports pain in     

      left lower quadrant(s) with urination, and sediments.                                       

10:28 Reassessment: Pt provided work note and d/c home, agrees that he will return to ED if   ph  

      he has any issues passing stone at home.                                                    

                                                                                                  

Vital Signs:                                                                                      

10:17  / 107; Pulse 93; Resp 18; Temp 98.1; Pulse Ox 99% on R/A; Weight 105.23 kg;      ph  

      Height 6 ft. 2 in. (187.96 cm); Pain 7/10;                                                  

10:17 Body Mass Index 29.79 (105.23 kg, 187.96 cm)                                              

                                                                                                  

ED Course:                                                                                        

10:01 Patient arrived in ED.                                                                  melanie  

10:04 Will Sim PA is PHCP.                                                                cp  

10:04 Conrado Allred MD is Attending Physician.                                                cp  

10:16 Roxana Iglesias, RN is Primary Nurse.                                                     ag7 

10:20 Triage completed.                                                                       ph  

10:21 Arm band placed on Patient placed in an exam room.                                      ph  

10:26 Patient has correct armband on for positive identification. Bed in low position. Call   ag7 

      light in reach.                                                                             

10:26 No provider procedures requiring assistance completed.                                  ag7 

10:29 Patient did not have IV access during this emergency room visit.                        ph  

                                                                                                  

Administered Medications:                                                                         

No medications were administered                                                                  

                                                                                                  

                                                                                                  

Outcome:                                                                                          

10:23 Discharge ordered by MD.                                                                cp  

10:28 Discharged to home ambulatory.                                                          ph  

10:28 Condition: good                                                                             

10:28 Discharge instructions given to patient, Instructed on discharge instructions, follow       

      up and referral plans. Demonstrated understanding of instructions, follow-up care.          

10:29 Patient left the ED.                                                                    ph  

                                                                                                  

Signatures:                                                                                       

Kiana Schmitt RN                      RN                                                      

Will Sim PA PA cp Zapata, Kelly                                                                                   

Roxana Iglesias, RN                       RN   [facility]                                                  

                                                                                                  

**************************************************************************************************

## 2022-05-16 ENCOUNTER — HOSPITAL ENCOUNTER (EMERGENCY)
Dept: HOSPITAL 97 - ER | Age: 37
Discharge: HOME | End: 2022-05-16
Payer: SELF-PAY

## 2022-05-16 VITALS — OXYGEN SATURATION: 99 % | SYSTOLIC BLOOD PRESSURE: 138 MMHG | TEMPERATURE: 97.6 F | DIASTOLIC BLOOD PRESSURE: 94 MMHG

## 2022-05-16 DIAGNOSIS — R51.9: ICD-10-CM

## 2022-05-16 DIAGNOSIS — E08.65: ICD-10-CM

## 2022-05-16 DIAGNOSIS — R53.1: Primary | ICD-10-CM

## 2022-05-16 DIAGNOSIS — F31.9: ICD-10-CM

## 2022-05-16 DIAGNOSIS — F17.210: ICD-10-CM

## 2022-05-16 LAB
ALBUMIN SERPL BCP-MCNC: 3.8 G/DL (ref 3.4–5)
ALP SERPL-CCNC: 66 U/L (ref 45–117)
ALT SERPL W P-5'-P-CCNC: 40 U/L (ref 12–78)
AST SERPL W P-5'-P-CCNC: 19 U/L (ref 15–37)
BUN BLD-MCNC: 9 MG/DL (ref 7–18)
GLUCOSE SERPLBLD-MCNC: 220 MG/DL (ref 74–106)
HCT VFR BLD CALC: 43.3 % (ref 39.6–49)
LIPASE SERPL-CCNC: 379 U/L (ref 73–393)
LYMPHOCYTES # SPEC AUTO: 2.8 K/UL (ref 0.7–4.9)
PMV BLD: 8.3 FL (ref 7.6–11.3)
POTASSIUM SERPL-SCNC: 3.9 MMOL/L (ref 3.5–5.1)
RBC # BLD: 4.71 M/UL (ref 4.33–5.43)
TROPONIN I SERPL HS-MCNC: 4.8 PG/ML (ref ?–58.9)

## 2022-05-16 PROCEDURE — 70450 CT HEAD/BRAIN W/O DYE: CPT

## 2022-05-16 PROCEDURE — 82947 ASSAY GLUCOSE BLOOD QUANT: CPT

## 2022-05-16 PROCEDURE — 83690 ASSAY OF LIPASE: CPT

## 2022-05-16 PROCEDURE — 93005 ELECTROCARDIOGRAM TRACING: CPT

## 2022-05-16 PROCEDURE — 96374 THER/PROPH/DIAG INJ IV PUSH: CPT

## 2022-05-16 PROCEDURE — 85025 COMPLETE CBC W/AUTO DIFF WBC: CPT

## 2022-05-16 PROCEDURE — 80053 COMPREHEN METABOLIC PANEL: CPT

## 2022-05-16 PROCEDURE — 99284 EMERGENCY DEPT VISIT MOD MDM: CPT

## 2022-05-16 PROCEDURE — 82010 KETONE BODYS QUAN: CPT

## 2022-05-16 PROCEDURE — 96375 TX/PRO/DX INJ NEW DRUG ADDON: CPT

## 2022-05-16 PROCEDURE — 81015 MICROSCOPIC EXAM OF URINE: CPT

## 2022-05-16 PROCEDURE — 36415 COLL VENOUS BLD VENIPUNCTURE: CPT

## 2022-05-16 PROCEDURE — 81003 URINALYSIS AUTO W/O SCOPE: CPT

## 2022-05-16 PROCEDURE — 83036 HEMOGLOBIN GLYCOSYLATED A1C: CPT

## 2022-05-16 PROCEDURE — 84484 ASSAY OF TROPONIN QUANT: CPT

## 2022-05-16 PROCEDURE — 96361 HYDRATE IV INFUSION ADD-ON: CPT

## 2022-05-16 NOTE — EDPHYS
Physician Documentation                                                                           

 [facility]                                                                 

Name: Sohail Cortezizer                                                                            

Age: 36 yrs                                                                                       

Sex: Male                                                                                         

: 1985                                                                                   

MRN: [id number]                                                                                   

Arrival Date: 2022                                                                          

Time: 13:03                                                                                       

Account#: [id number]                                                                            

Bed 3                                                                                             

Private MD:                                                                                       

ED Physician Will Stringer                                                                      

HPI:                                                                                              

                                                                                             

15:00 This 36 yrs old Male presents to ER via Ambulatory with complaints of Blood Sugar Issue.cp  

15:00 The patient has been recently seen by a physician: in [place], with similar presenting    cp  

      complaints, and was sent to the [facility] Emergency Department      

      for further evaluation.                                                                     

15:00 Patient reports feeling "groggy" and generally weak at work today. Was seen at [place]    cp  

      today and referred to ED for elevated blood glucose level. Patient denies history of        

      diabetes.                                                                                   

                                                                                                  

Historical:                                                                                       

- Allergies:                                                                                      

13:36 Adhesives;                                                                              ll1 

- PMHx:                                                                                           

13:36 Kidney stones; ADD/ADHD; Bipolar disorder;                                              ll1 

- PSHx:                                                                                           

13:36 R ear tumor removed;                                                                    ll1 

                                                                                                  

- Immunization history:: Client reports having NOT received the Covid vaccine. Flu                

  vaccine status is unknown.                                                                      

- Social history:: Smoking status: Patient reports the use of cigarette tobacco                   

  products, smokes one-half pack cigarettes per day.                                              

                                                                                                  

                                                                                                  

ROS:                                                                                              

15:05 Constitutional: Positive for fatigue, Negative for body aches, chills, fever, poor PO   cp  

      intake.                                                                                     

15:05 Eyes: Negative for injury, pain, redness, and discharge.                                cp  

15:05 ENT: Negative for drainage from ear(s), ear pain, sore throat, difficulty swallowing,       

      difficulty handling secretions.                                                             

15:05 Cardiovascular: Negative for chest pain, edema, palpitations.                               

15:05 Respiratory: Negative for cough, shortness of breath, wheezing.                             

15:05 Abdomen/GI: Negative for abdominal pain, vomiting, diarrhea, constipation.                  

15:05 Back: Negative for pain at rest, pain with movement.                                        

15:05 : Positive for urinary frequency, Negative for burning with urination.                    

15:05 Neuro: Positive for headache, weakness, Negative for altered mental status, numbness,       

      syncope.                                                                                    

15:05 All other systems are negative.                                                             

                                                                                                  

Exam:                                                                                             

15:10 Constitutional: The patient appears in no acute distress, alert, awake,                 cp  

      non-diaphoretic, non-toxic, well developed, well nourished, overweight                      

15:10 Head/Face:  Normocephalic, atraumatic.                                                  cp  

15:10 Eyes: Periorbital structures: appear normal, Pupils: equal, round, and reactive to      cp  

      light and accomodation, Extraocular movements: intact throughout, Conjunctiva: normal,      

      no exudate, no injection, Sclera: no appreciated abnormality, Lids and lashes: appear       

      normal, bilaterally.                                                                        

15:10 ENT: External ear(s): are unremarkable, Nose: is normal, Mouth: Lips: moist, Oral           

      mucosa: pink and intact, moist, Posterior pharynx: Airway: no evidence of obstruction,      

      patent.                                                                                     

15:10 Neck: ROM/movement: is normal, is supple, without pain, no range of motions                 

      limitations, no meningismus.                                                                

15:10 Chest/axilla: Inspection: normal, Palpation: is normal, no crepitus, no tenderness.         

15:10 Cardiovascular: Rate: normal, Rhythm: regular, Edema: is not appreciated, JVD: is not       

      appreciated.                                                                                

15:10 Respiratory: the patient does not display signs of respiratory distress,  Respirations:     

      normal, no use of accessory muscles, no retractions, labored breathing, is not present,     

      Breath sounds: are clear throughout, no decreased breath sounds, no stridor, no             

      wheezing.                                                                                   

15:10 Abdomen/GI: Inspection: abdomen appears normal, Palpation: abdomen is soft and              

      non-tender, in all quadrants.                                                               

15:10 Back: pain, is absent, ROM is normal.                                                       

15:10 Neuro: Orientation: to person, place \T\ time. Mentation: is normal, Motor: is normal,      

      Sensation: no obvious gross deficits, Gait: is steady, at a normal pace, without            

      difficulty.                                                                                 

15:55 ECG was reviewed by the Attending Physician.                                            cp  

                                                                                                  

Vital Signs:                                                                                      

13:32  / 88; Pulse 92; Resp 18; Temp 98.0; Pulse Ox 99% ; Weight 108.86 kg; Height 6    ll1 

      ft. 2 in. (187.96 cm); Pain 5/10;                                                           

15:40  / 109; Pulse 91; Resp 18; Pulse Ox 97% on R/A;                                   ph  

17:35  / 94; Pulse 87; Resp 18; Temp 97.6; Pulse Ox 99% on R/A;                         ph  

13:32 Body Mass Index 30.81 (108.86 kg, 187.96 cm)                                            ll1 

                                                                                                  

MDM:                                                                                              

14:31 Patient medically screened.                                                               

17:30 Data reviewed: vital signs, nurses notes, lab test result(s), EKG, radiologic studies,  cp  

      CT scan.                                                                                    

17:30 Test interpretation: by ED physician or midlevel provider: ECG. Counseling: I had a     cp  

      detailed discussion with the patient and/or guardian regarding: the historical points,      

      exam findings, and any diagnostic results supporting the discharge/admit diagnosis, the     

      presence of at least one elevated blood pressure reading (>120/80) during this              

      emergency department visit, lab results, radiology results, the need for outpatient         

      follow up, for definitive care, a family practitioner, to return to the emergency           

      department if symptoms worsen or persist or if there are any questions or concerns that     

      arise at home. Response to treatment: the patient's symptoms have markedly improved         

      after treatment. ED course: VSS. Discussed results of labs and radiology studies.           

      Discussed elevated blood glucose level and elevated A1C. Patient stable to be               

      discharged to home with understanding of need to f/u with a family physician.               

                                                                                                  

                                                                                             

13:46 Order name: Glucose, Ancillary Testing; Complete Time: 14:31                            EDMS

                                                                                             

14:31 Interpretation: Reviewed.                                                                 

                                                                                             

14:45 Order name: CBC with Diff; Complete Time: 16:02                                           

                                                                                             

14:45 Order name: CMP; Complete Time: 16:02                                                     

                                                                                             

16:02 Interpretation: Normal except: ; GLUC 220; GLOB 3.9; A/G 1.0.                       

                                                                                             

14:45 Order name: Lipase; Complete Time: 16:02                                                  

                                                                                             

16:07 Interpretation: Reviewed.                                                                 

                                                                                             

14:45 Order name: Urine Microscopic Only; Complete Time: 16:02                                  

                                                                                             

16:03 Interpretation: Normal except: URCRY MODERATE.                                            

                                                                                             

14:45 Order name: Ketone, Serum; Complete Time: 16:02                                           

                                                                                             

16:03 Interpretation: Reviewed.                                                                 

                                                                                             

14:46 Order name: Troponin High Sensitivity; Complete Time: 16:02                               

                                                                                             

15:21 Order name: Urine Dipstick-Ancillary; Complete Time: 16:02                              [facility]

                                                                                             

16:03 Interpretation: Normal except: UGLUC 2+; UPROT Trace.                                     

                                                                                             

16:07 Order name: CT Head Brain wo Cont; Complete Time: 17:28                                 cp  

                                                                                             

16:09 Order name: Hemoglobin A1c                                                              cp  

                                                                                             

16:33 Order name: Hemoglobin A1c; Complete Time: 17:07                                        EDMS

                                                                                             

17:07 Interpretation: Abnormal: HA1C 9.1.                                                     cp  

                                                                                             

14:45 Order name: IV Saline Lock; Complete Time: 15:38                                        cp  

                                                                                             

14:45 Order name: Labs collected and sent; Complete Time: 15:38                               cp  

                                                                                             

14:45 Order name: Urine Dipstick-Ancillary (obtain specimen); Complete Time: 15:38            cp  

                                                                                             

14:46 Order name: EKG; Complete Time: 14:46                                                   cp  

                                                                                             

14:46 Order name: EKG - Nurse/Tech; Complete Time: 15:38                                      cp  

                                                                                                  

ECG:                                                                                              

15:55 Rate is 82 beats/min. Rhythm is regular. KS interval is normal. QRS interval is         cp  

      prolonged at 102 msec. QT interval is normal. T waves are Inverted in lead aVR.             

      Interpreted by me. Reviewed by me.                                                          

                                                                                                  

Administered Medications:                                                                         

15:38 Drug: NS 0.9% 1000 ml Route: IV; Rate: 1 bolus; Site: left antecubital;                 ph  

17:35 Follow up: Response: No adverse reaction; IV Status: Completed infusion; IV Intake:     ph  

      1000ml                                                                                      

15:39 Drug: Zofran (Ondansetron) 4 mg Route: IVP; Site: left antecubital;                     ph  

17:36 Follow up: Response: No adverse reaction; Nausea is decreased                           ph  

15:39 Drug: Ativan (LORazepam) 1 mg Route: IVP; Site: left antecubital;                       ph  

17:35 Follow up: Response: No adverse reaction                                                ph  

15:40 Drug: Tylenol 1000 mg Route: PO;                                                        ph  

17:35 Follow up: Response: No adverse reaction                                                ph  

                                                                                                  

                                                                                                  

Disposition Summary:                                                                              

22 17:37                                                                                    

Discharge Ordered                                                                                 

      Location: Home                                                                          cp  

      Problem: new                                                                            cp  

      Symptoms: have improved                                                                 cp  

      Condition: Stable                                                                       cp  

      Diagnosis                                                                                   

        - Diabetes mellitus due to underlying condition with hyperglycemia                    cp  

        - Weakness                                                                            cp  

        - Headache                                                                            cp  

      Followup:                                                                               cp  

        - With: Private Physician                                                                  

        - When: 1 - 2 days                                                                         

        - Reason: diabetes mellitus                                                                

      Discharge Instructions:                                                                     

        - Discharge Summary Sheet                                                             ph  

        - Type 2 Diabetes Mellitus, Diagnosis, Adult                                          cp  

        - Hyperglycemia                                                                       cp  

        - Weakness                                                                            cp  

        - Fatigue                                                                             cp  

        - Blood Glucose Monitoring, Adult                                                     cp  

        - Diabetes Mellitus and Nutrition, Adult                                              cp  

      Forms:                                                                                      

        - Work release form                                                                   ph  

        - Family Work Release                                                                 ph  

        - Medication Reconciliation Form                                                      cp  

        - Thank You Letter                                                                    cp  

        - Antibiotic Education                                                                cp  

        - Prescription Opioid Use                                                             cp  

      Prescriptions:                                                                              

        - Metformin 500 mg Oral Tablet                                                             

            - take 1 tablet by ORAL route once daily for 7 days Then take 1 tablet with       cp  

      morning meals AND evening meals; 60 tablet; Refills: 0, Product Selection Permitted         

Signatures:                                                                                       

Dispatcher [employer]                           Kiana Dobson RN RN ph Page, Corey, PA PA cp Lewis, Lynsay RN                       RN   ll1                                                  

                                                                                                  

Corrections: (The following items were deleted from the chart)                                    

                                                                                             

15:16 05 15:05 Neuro: Positive for weakness, Negative for altered mental status, headache, cp  

      numbness, syncope, cp                                                                       

                                                                                                  

**************************************************************************************************

## 2022-05-16 NOTE — RAD REPORT
EXAM DESCRIPTION:  CT - Head Brain Wo Cont - 5/16/2022 5:11 pm

 

CLINICAL HISTORY:  weakness

Headache, drowsiness

 

COMPARISON:  No comparisons

 

TECHNIQUE:  All CT scans are performed using dose optimization technique as appropriate and may inclu
de automated exposure control or mA/KV adjustment according to patient size.

 

FINDINGS:  No intracranial hemorrhage, hydrocephalus or extra-axial fluid collection.No areas of brai
n edema or evidence of midline shift.

 

The paranasal sinuses and mastoids are clear. The calvarium is intact.

 

IMPRESSION:  No acute intracranial abnormality.

## 2022-05-16 NOTE — ER
Nurse's Notes                                                                                     

 [facility]                                                                 

Name: Sohail Cortezizer                                                                            

Age: 36 yrs                                                                                       

Sex: Male                                                                                         

: 1985                                                                                   

MRN: [id number]                                                                                   

Arrival Date: 2022                                                                          

Time: 13:03                                                                                       

Account#: [id number]                                                                            

Bed 3                                                                                             

Private MD:                                                                                       

Diagnosis: Diabetes mellitus due to underlying condition with hyperglycemia;Weakness;Headache     

                                                                                                  

Presentation:                                                                                     

                                                                                             

13:32 Chief complaint: Patient states: Blood sugar 252 just PTA at [place] just PTA. Sent here  ll1 

      for further eval. Felt groggy, weak at work today. Fingerstick 250 now, not a known         

      diabetic. Coronavirus screen: Vaccine status: Patient reports being unvaccinated.           

      Client denies travel out of the [place] in the last 14 days. At this time, the client does     

      not indicate any symptoms associated with coronavirus-19. Ebola Screen: Patient denies      

      travel to an Ebola-affected area in the 21 days before illness onset. Initial Sepsis        

      Screen: Does the patient meet any 2 criteria? HR > 90 bpm. No. Patient's initial sepsis     

      screen is negative. Does the patient have a suspected source of infection? No.              

      Patient's initial sepsis screen is negative. Risk Assessment: Do you want to hurt           

      yourself or someone else? Patient reports no desire to harm self or others. Onset of        

      symptoms was May 16, 2022.                                                                  

13:32 Method Of Arrival: Ambulatory                                                           [facility] 

13:32 Acuity: ANUSHA 2                                                                           ll1 

                                                                                                  

Historical:                                                                                       

- Allergies:                                                                                      

13:36 Adhesives;                                                                              ll1 

- PMHx:                                                                                           

13:36 Kidney stones; ADD/ADHD; Bipolar disorder;                                              ll1 

- PSHx:                                                                                           

13:36 R ear tumor removed;                                                                    ll1 

                                                                                                  

- Immunization history:: Client reports having NOT received the Covid vaccine. Flu                

  vaccine status is unknown.                                                                      

- Social history:: Smoking status: Patient reports the use of cigarette tobacco                   

  products, smokes one-half pack cigarettes per day.                                              

                                                                                                  

                                                                                                  

Screenin:45 Abuse screen: Denies threats or abuse. Denies injuries from another. Nutritional        ph  

      screening: No deficits noted. Tuberculosis screening: No symptoms or risk factors           

      identified. Fall Risk None identified.                                                      

                                                                                                  

Assessment:                                                                                       

15:40 General: Appears in no apparent distress. comfortable, Behavior is calm, cooperative,   ph  

      appropriate for age, Denies fever, feeling ill. Pain: Complains of pain in headache.        

      Neuro: Level of Consciousness is awake, alert, obeys commands, Oriented to person,          

      place, time, situation, Reports headache. Cardiovascular: Denies chest pain, shortness      

      of breath, Capillary refill < 3 seconds in bilateral fingers Patient's skin is warm and     

      dry. Respiratory: Airway is patent Respiratory effort is even, unlabored, Respiratory       

      pattern is regular, symmetrical. GI: Reports nausea, Patient currently denies abdominal     

      pain, vomiting. Derm: Skin is intact, is healthy with good turgor, Skin is pink, warm \T\   

      dry. Musculoskeletal: Circulation, motion, and sensation intact. Range of motion:           

      intact in all extremities.                                                                  

16:20 Reassessment: Patient appears in no apparent distress at this time. Patient and/or      ph  

      family updated on plan of care and expected duration. Pain level reassessed. Patient is     

      alert, oriented x 3, equal unlabored respirations, skin warm/dry/pink.                      

17:34 Reassessment: Patient appears in no apparent distress at this time. Patient and/or      ph  

      family updated on plan of care and expected duration. Pain level reassessed. Patient is     

      alert, oriented x 3, equal unlabored respirations, skin warm/dry/pink.                      

18:01 Reassessment: Patient appears in no apparent distress at this time. Patient and/or      ph  

      family updated on plan of care and expected duration. Pain level reassessed. Patient is     

      alert, oriented x 3, equal unlabored respirations, skin warm/dry/pink.                      

                                                                                                  

Vital Signs:                                                                                      

13:32  / 88; Pulse 92; Resp 18; Temp 98.0; Pulse Ox 99% ; Weight 108.86 kg; Height 6    ll1 

      ft. 2 in. (187.96 cm); Pain 5/10;                                                           

15:40  / 109; Pulse 91; Resp 18; Pulse Ox 97% on R/A;                                   ph  

17:35  / 94; Pulse 87; Resp 18; Temp 97.6; Pulse Ox 99% on R/A;                         ph  

13:32 Body Mass Index 30.81 (108.86 kg, 187.96 cm)                                            ll1 

                                                                                                  

ED Course:                                                                                        

13:03 Patient arrived in ED.                                                                  ds1 

13:28 Will Sim PA is PHCP.                                                                cp  

13:28 Will Stringer MD is Attending Physician.                                             cp  

13:36 Triage completed.                                                                       ll1 

13:37 Arm band placed on.                                                                     ll1 

14:45 Kiana Schmitt, RN is Primary Nurse.                                                    ph  

15:20 Initial lab(s) drawn, by me, sent to lab. Inserted saline lock: 20 gauge in left        ph  

      antecubital area, using aseptic technique. Blood collected.                                 

15:41 Patient has correct armband on for positive identification. Bed in low position. Call   ph  

      light in reach. Side rails up X 1. Client placed on continuous cardiac and pulse            

      oximetry monitoring. NIBP monitoring applied.                                               

17:12 CT Head Brain wo Cont In Process Unspecified.                                           EDMS

17:35 No provider procedures requiring assistance completed.                                  ph  

18:02 IV discontinued, intact, bleeding controlled, No redness/swelling at site. Pressure     ph  

      dressing applied.                                                                           

                                                                                                  

Administered Medications:                                                                         

15:38 Drug: NS 0.9% 1000 ml Route: IV; Rate: 1 bolus; Site: left antecubital;                 ph  

17:35 Follow up: Response: No adverse reaction; IV Status: Completed infusion; IV Intake:     ph  

      1000ml                                                                                      

15:39 Drug: Zofran (Ondansetron) 4 mg Route: IVP; Site: left antecubital;                     ph  

17:36 Follow up: Response: No adverse reaction; Nausea is decreased                           ph  

15:39 Drug: Ativan (LORazepam) 1 mg Route: IVP; Site: left antecubital;                       ph  

17:35 Follow up: Response: No adverse reaction                                                ph  

15:40 Drug: Tylenol 1000 mg Route: PO;                                                        ph  

17:35 Follow up: Response: No adverse reaction                                                ph  

                                                                                                  

                                                                                                  

Medication:                                                                                       

14:45 VIS not applicable for this client.                                                     ph  

                                                                                                  

Intake:                                                                                           

17:35 IV: 1000ml; Total: 1000ml.                                                              ph  

                                                                                                  

Outcome:                                                                                          

17:37 Discharge ordered by MD.                                                                  

18:02 Discharged to home ambulatory.                                                          ph  

18:02 Condition: good                                                                             

18:02 Discharge instructions given to patient, Instructed on discharge instructions, follow       

      up and referral plans. medication usage, Demonstrated understanding of instructions,        

      follow-up care, medications, Prescriptions given X 1.                                       

18:02 Patient left the ED.                                                                    ph  

                                                                                                  

Signatures:                                                                                       

Dispatcher [employer]                           [facility]                                                 

Tahmina Reynolds                                ds1                                                  

Kiana Schmitt, RN                      RN   ph                                                   

Will Sim PA PA   cp                                                   

Bubba Donato RN                       RN   ll1                                                  

                                                                                                  

Corrections: (The following items were deleted from the chart)                                    

13:37 13:32 Chief complaint: Patient states: Blood sugar 252 just PTA at [place] just PTA. Sent ll1 

      here for further eval. Felt groggy, weak at work today ll1                                  

13:37 13:32 Pulse 92bpm; Resp 18bpm; Pulse Ox 99%; Temp 98.0F; 108.86 kg; Height 6 ft. 2 in.; ll1 

      BMI: 30.8; Pain 5/10; ll1                                                                   

13:39 13:32 [facility] 

                                                                                                  

**************************************************************************************************

## 2022-05-16 NOTE — XMS REPORT
Continuity of Care Document

                             Created on:May 16, 2022



Patient:KILO MELISSA

Sex:Male

:1985

External Reference #:[id number]





Demographics







                          Address                   [address]



                                                    [place] [address]

 

                          Home Phone                [phone]

 

                          Work Phone                [phone]

 

                          Email Address             DECLINED

 

                          Preferred Language        English

 

                          Marital Status            Unknown

 

                          Taoist Affiliation     Unknown

 

                          Race                      Unknown

 

                          Ethnic Group              Unknown









Author







                          Organization              [facility]

t

 

                          Address                   [address]



                                                    [place] [address]

 

                          Phone                     [phone]









Support







                Name            Relationship    Address         Phone

 

                SIRISHA          Unavailable     N/A             [phone]



                                                [place] [address] 

 

                SIRISHA          Unavailable     N/A             [phone]



                                                [place] [address] 

 

                OTHER           Unavailable     N/A             [phone]



                                                [place] [address] 

 

                BHATTROMARIO  Unavailable     [address] [facility]   [phone]



                                                [place] [address] 

 

                SIRISHA          Unavailable     [facility]             [phone]



                                                [place] [address] 









Care Team Providers







                    Name                Role                Phone

 

                    DAVID Das          Attending Clinician Unavailable

 

                    GORGE Wilson            Attending Clinician Unavailable

 

                    Physician,  Primary or Family Admitting Clinician Unavailabl

e









Payers







           Payer Name Policy Type Policy Number Effective Date Expiration Date S

ource







Problems

This patient has no known problems.



Allergies, Adverse Reactions, Alerts







       Allergy Allergy Status Severity Reaction(s) Onset  Inactive Treating Comm

ents 

Source



       Name   Type                        Date   Date   Clinician        

 

       adhesive DA     Active U      SWELLING -0                      [facility]



                                          4-17                        [facility]



                                          00:00:                      e



                                          00                          Medical



                                                                      Center

 

       adhesive DA     Active U             -0                      [facility]



                                          4-17                        [facility]



                                          00:00:                      e



                                          00                          Medical



                                                                      Center

 

       adhesive DA     Active U             2020-0                      [facility]



                                          3-10                        [facility]



                                          00:00:                      e



                                          00                          Medical



                                                                      Center

 

       adhesive DA     Active U      SWELLING 2020-0                      [facility]



                                          3-10                        [facility]



                                          00:00:                      e



                                          00                          Medical



                                                                      Center

 

       adhesive DA     Active U             2019-0                      [facility]



                                          8-22                        [facility]



                                          00:00:                      [place]



                                          00                          [facility]

 

       adhesive DA     Active U      SWELLING -0                      [facility]



                                          8-22                        [facility]



                                          00:00:                      [place]



                                          00                          [facility]

 

       adhesive DA     Active U             -                      [facility]



                                          2-09                        [facility]



                                          00:00:                      [place]



                                          00                          [facility]







Medications

This patient has no known medications.



Procedures

This patient has no known procedures.



Encounters







        Start   End     Encounter Admission Attending Care    Care    Encounter 

Source



        Date/Time Date/Time Type    Type    Clinicians Facility Department ID   

   

 

        2021-05-15         Inpatient                 [facility]   [facility]    [id number] 

[facility]



        07:34:00                                                 [id number]  [facility]

 

        2021         Inpatient                 [facility]   [facility]    [id number] 

[facility]



        11:22:00                                                 [id number]  [facility]

 

        2021         Inpatient                 [facility]   [facility]    [id number] 

[facility]



        11:36:00                                                 [id number]  [facility]

 

        2021         Inpatient                 [facility]   [facility]    [id number] 

[facility]



        14:09:00                                                 [id number]  [facility]

 

        2021         Inpatient                 [facility]   [facility]    [id number] 

[facility]



        16:15:00                                                 2101  [facility]

 

        2020         Inpatient                 [facility]   [facility]    [id number] 

[facility]



        15:05:00                                                   [facility]

 

        2020         Inpatient                 [facility]   [facility]    [id number] 

[facility]



        09:31:00                                                 2008  [facility]

 

        2020-03-10         Inpatient                 [facility]   [facility]    [id number] 

[facility]



        12:54:00                                                   [facility]

 

        2020         Inpatient                 [facility]   [facility]    [id number] 

[facility]



        19:29:00                                                   [facility]

 

        2021 Emergency EM      [facility]   [facility]    [id number]

[id number] [facility]



        14:21:00 16:20:00                 Tommie                 [id number]  [facility]

 

        2021 Emergency EM      Katie   [facility]   [facility]    [id number]

[id number] [facility]



        18:50:00 20:01:00                 Gissel                  [id number]  [facility]







Results







           Test Description Test Time  Test Comments Results    Result     [facility]

e



                                                       Comments   

 

           - XR ANKLE 3 + V 2021            *********************         

   



           LT         15:48:00              *********************            



                                            ******************[facility]             



                                            ([facility])Name:            



                                            WORKIZERTHOM            



                                                : 1985            



                                                 Sex:             



                                            M********************            



                                            *********************            



                                            *******************            



                                            Name:                 



                                            WORKIZERTHOM            



                                            [facility]    :            



                                            1985 Age/S:36            



                                            /M            [address]            



                                            [facility]            



                                                 Unit#:[id number]            



                                                Loc: [place] [address]            



                                                          Phys:            



                                            Amauri Avalos NP            



                                                                  



                                                                  



                                             Acct#: [id number]            



                                            Dis Date:             



                                                  PHONE #:            



                                            [phone]            



                                            Status: PRE ER            



                                                                  



                                                 FAX #:            



                                            [phone]            



                                            Exam Date: 2021            



                                                      Reason:            



                                            LEFT ANKLE PAIN            



                                                                  



                                                    EXAMS:            



                                                                  



                                                             CPT            



                                            CODE:      [id number]            



                                            XR ANKLE 3 + V LT            



                                                                  



                                            [id number]                 



                                               HISTORY: Left            



                                            ankle pain.            



                                                COMPARISON: X-ray            



                                            from 2019.            



                                                                  



                                            Location: [facility].            



                                                   No acute            



                                            fracture or            



                                            dislocation. Old            



                                            fracture deformity of            



                                            the medial            



                                            malleolus with            



                                            nonunion. Marginal            



                                            osteophytes from the            



                                            talus along its            



                                            lateral inferior            



                                            margin as well is            



                                            unchanged. Prominent            



                                            posterior       talar            



                                            process. Achilles and            



                                            plantar calcaneal            



                                            enthesophytes. No            



                                              osteochondral            



                                            defects of the talus.            



                                            Tibial plafond is            



                                            unremarkable. No            



                                             soft tissue            



                                            swelling. No joint            



                                            fluid.                



                                             IMPRESSION:            



                                                     No acute            



                                            fracture or            



                                            dislocation. DJD.            



                                                 **               



                                            Electronically Signed            



                                            by JED Cleary            



                                            on 2021 at 1548            



                                            **                    



                                              Reported and signed            



                                            by: Rc Cleary M.D.                  



                                                  CC:             



                                            Tommie Das MD; Amauri Avalos NP            



                                                                  



                                                                  



                                                                  



                                                                  



                                                Technologist:            



                                            WILFREDO ENRIQUE(R),RDMS,CT            



                                                                  



                                            Trnscrpt Data:            



                                            2021 (1548)            



                                            [id number]             



                                                         Orig            



                                            Print D/T: S:            



                                            2021 (6179)            



                                                                  



                                            PAGE  1               



                                                    Signed Report            



                                                                  



                                                                  

 

           - XR FOOT 3 + V RT 2021            *********************       

     



                      19:50:00              *********************            



                                            ******************[facility]             



                                            ([facility])Name:            



                                            THOM MELISSA            



                                                : 1985            



                                                 Sex:             



                                            M********************            



                                            *********************            



                                            *******************            



                                            Name:                 



                                            THOM MELISSAwood            



                                            [facility]    :            



                                            1985 Age/S:36            



                                            /M            [address]            



                                            [facility]            



                                                 Unit#:[id number]            



                                                Loc: [place] [address]            



                                                          Phys:            



                                            Lady Limon  NP            



                                                                  



                                                                  



                                             Acct#: [id number]            



                                            Dis Date:             



                                                  PHONE #:            



                                            [phone]            



                                            Status: REG ER            



                                                                  



                                                 FAX #:            



                                            [phone]            



                                            Exam Date: 2021            



                                                      Reason:            



                                            pain                  



                                                                  



                                                    EXAMS:            



                                                                  



                                                             CPT            



                                            CODE:      [id number]            



                                            XR FOOT 3 + V RT            



                                                                  



                                            [id number]                 



                                                                  



                                                 REASON FOR EXAM:            



                                            pain adn swelling            



                                                       EXAM ORDER            



                                            DATE: 2021 7:09            



                                            PM                    



                                            Ordering: Lady Limon NP            



                                            Location:[facility]            



                                                 PROCEDURE:  - XR            



                                            ANKLE 3 + V RT,  - XR            



                                            FOOT 3 + V RT            



                                                  FINDINGS: 3            



                                            views of the right            



                                            ankle and 3 views of            



                                            the right foot            



                                            were obtained. The            



                                            osseous structures            



                                            are unremarkable in            



                                            size and       shape.            



                                            The joint spaces are            



                                            maintained. No            



                                            evidence of fracture.            



                                             The                  



                                            syndesmosis is            



                                            intact.               



                                                IMPRESSION: No            



                                            acute abnormalities.            



                                                    **            



                                            Electronically Signed            



                                            by Gino De La Rosa M.D.            



                                            on 2021 at 1950            



                                            **                    



                                              Reported and signed            



                                            by: Gino De La Rosa M.D.            



                                                                  



                                            CC: Lady Limon NP                    



                                                                  



                                                                  



                                                                  



                                                                  



                                                    Technologist:            



                                            WILFREDO ENRIQUE            



                                            RT(R),RDMS,CT            



                                                                  



                                            Trnscrpt Data:            



                                            2021 ()            



                                            [email or web address]            



                                                         Orig            



                                            Print D/T: S:            



                                            2021 ()            



                                                                  



                                            PAGE  1               



                                                    Signed Report            



                                                                  



                                                                  

 

           - XR ANKLE 3 + V 2021            *********************         

   



           RT         19:50:00              *********************            



                                            ******************[facility]             



                                            ([facility])Name:            



                                            THOM MELISSA            



                                                : 1985            



                                                 Sex:             



                                            M********************            



                                            *********************            



                                            *******************            



                                            Name:                 



                                            THOM MELISSAwood            



                                            [facility]    :            



                                            1985 Age/S:36            



                                            /M            [address]            



                                            [facility]            



                                                 Unit#:[id number]            



                                                Loc: [place]            



                                               [place] [address]            



                                                          Phys:            



                                            Lady Limon NP            



                                                                  



                                                                  



                                             Acct#: [id number]            



                                            Dis Date:             



                                                  PHONE #:            



                                            [phone]            



                                            Status: REG ER            



                                                                  



                                                 FAX #:            



                                            [phone]            



                                            Exam Date: 2021            



                                                      Reason:            



                                            pain adn swelling            



                                                                  



                                                    EXAMS:            



                                                                  



                                                             CPT            



                                            CODE:      [id number]            



                                            XR ANKLE 3 + V RT            



                                                                  



                                            [id number]                 



                                                                  



                                                 REASON FOR EXAM:            



                                            pain adn swelling            



                                                       EXAM ORDER            



                                            DATE: 2021 7:09            



                                            PM                    



                                            Ordering: Lady Limon NP            



                                            Location:[facility]            



                                                 PROCEDURE:  - XR            



                                            ANKLE 3 + V RT,  - XR            



                                            FOOT 3 + V RT            



                                                  FINDINGS: 3            



                                            views of the right            



                                            ankle and 3 views of            



                                            the right foot            



                                            were obtained. The            



                                            osseous structures            



                                            are unremarkable in            



                                            size and       shape.            



                                            The joint spaces are            



                                            maintained. No            



                                            evidence of fracture.            



                                             The                  



                                            syndesmosis is            



                                            intact.               



                                                IMPRESSION: No            



                                            acute abnormalities.            



                                                    **            



                                            Electronically Signed            



                                            by Gino De La Rosa M.D.            



                                            on 2021 at             



                                            **                    



                                              Reported and signed            



                                            by: Gino De La Rosa M.D.            



                                                                  



                                            CC: Lady Limon NP                    



                                                                  



                                                                  



                                                                  



                                                                  



                                                    Technologist:            



                                            WILFREDO ENRIQUE            



                                            RT(R),RDMS,CT            



                                                                  



                                            Trnscrpt Data:            



                                            2021 ()            



                                            [id number]            



                                                         Orig            



                                            Print D/T: S:            



                                            2021 ()            



                                                                  



                                            PAGE  1               



                                                    Signed Report            



                                                                  



                                                                  









                    STREPTOCOCCUS PCR SCREEN 2021 04:00:00 









                      Test Item  Value      Reference Range Interpretation Comme

nts









             STREPTOCOCCUS DYSGALACTIAE (test code = STREPGC) NEGATIVE FOR G/C N

EGATIVE                  

 

             STREPA MOLECULAR (test code = STREPAMOL) NEGATIVE FOR GRP A NEGATIV

E                  



[id number] 09:03:00





             Test Item    Value        Reference Range Interpretation Comments

 

             D-DIMER (test code = DDIMER) < 100 ng/ml  < 600                    

 



COMPREHENSIVE METABOLIC PANEL2021-05-15 09:01:00





             Test Item    Value        Reference Range Interpretation Comments

 

             SODIUM (test code = 135 mmol/L   136-145      L            



             NA)                                                 

 

             POTASSIUM (test code = 4.1 mmol/L   3.5-5.1      N            



             K)                                                  

 

             CHLORIDE (test code = 100 mmol/L   101-109      L            



             CL)                                                 

 

             CARBON DIOXIDE (test 23.7 mmol/L  21-32        N            



             code = CO2)                                         

 

             ANION GAP (test code = 15 mmol/L    10-20        N            



             GAP)                                                

 

             GLUCOSE (test code = 217 mg/dL           H            



             GLU)                                                

 

             BLOOD UREA NITROGEN 5 mg/dL      3-21         N            



             (test code = BUN)                                        

 

             CREATININE (test code 1.00 mg/dL   0.55-1.3     N            



             = CREAT)                                            

 

             BUN/CREATININE RATIO 5.0          10-20        L            



             (test code = BUN/CREA)                                        

 

             TOTAL PROTEIN (test 7.5 g/dL     6.5-8.4      N            



             code = PROT)                                        

 

             ALBUMIN (test code = 4.0 g/dL     3.4-4.8      N            



             ALB)                                                

 

             GLOBULIN (test code = 3.5 G/DL     1-10         N            



             GLOB)                                               

 

             ALBUMIN/GLOBULIN RATIO 1.14 RATIO   0.75-1.50    N            



             (test code = A/G)                                        

 

             CALCIUM (test code = 8.8 mg/dL    8.4-10.2     N            



             CA)                                                 

 

             BILIRUBIN TOTAL (test 0.40 mg/dL   0.0-1.0      N            



             code = BILT)                                        

 

             SGOT/AST (test code = 22 U/L       6-32         N            



             AST)                                                

 

             SGPT/ALT (test code = 37 U/L       12-78        N            Note: 

Change in



             ALT)                                                REFERENCE RANGE

 due



                                                                 to new reagent



                                                                 method.

 

             ALKALINE PHOSPHATASE 83 U/L              N            



             TOTAL (test code =                                        



             ALKP)                                               



[id number]--[id number] 09:01:00





             Test Item    Value        Reference Range Interpretation Comments

 

             TROPONIN-I (test code = TROPI) <0.015 ng/mL 0.00-0.056   N         

   



COVID 19 INHOUSE AG2021-05-15 08:59:00





             Test Item    Value        Reference Range Interpretation Comments

 

             COVID 19 INHOUSE AG (test code = NEGATIVE     NEGATIVE             

     



             [id number])                                        



COMPREHENSIVE METABOLIC PANEL2021-05-15 08:52:00





             Test Item    Value        Reference Range Interpretation Comments

 

             SODIUM (test code = NA) 135 mmol/L   136-145      L            

 

             POTASSIUM (test code = K) 4.1 mmol/L   3.5-5.1      N            

 

             CHLORIDE (test code = CL) 100 mmol/L   101-109      L            

 

             CARBON DIOXIDE (test code = CO2) 23.7 mmol/L  21-32        N       

     

 

             ANION GAP (test code = GAP) 15 mmol/L    10-20        N            

 

             GLUCOSE (test code = GLU) 217 mg/dL           H            

 

             BLOOD UREA NITROGEN (test code = 5 mg/dL      3-21         N       

     



             BUN)                                                

 

             CREATININE (test code = CREAT) 1.00 mg/dL   0.55-1.3     N         

   

 

             BUN/CREATININE RATIO (test code = 5.0          10-20        L      

      



             BUN/CREA)                                           

 

             TOTAL PROTEIN (test code = PROT)  gram/dL     6.4-8.2              

     

 

             ALBUMIN (test code = ALB)  g/dL        3.4-5.0                   

 

             GLOBULIN (test code = GLOB)  g/dL        2.7-4.2                   

 

             ALBUMIN/GLOBULIN RATIO (test code              0.75-1.50           

      



             = A/G)                                              

 

             CALCIUM (test code = CA) 8.8 mg/dL    8.4-10.2     N            

 

             BILIRUBIN TOTAL (test code =  mg/dL       0.2-1.2                  

 



             BILT)                                               

 

             SGOT/AST (test code = AST)  IUnit/L     15-37                     

 

             SGPT/ALT (test code = ALT)  U/L         10-69                     

 

             ALKALINE PHOSPHATASE TOTAL (test  IUnit/L                    

     



             code = ALKP)                                        



[id number] 08:52:00





             Test Item    Value        Reference Range Interpretation Comments

 

             TROPONIN-I (test code = TROPI)  ng/mL       0-0.045                

   



CBC W/AUTO DIFF2021-05-15 08:41:00





             Test Item    Value        Reference Range Interpretation Comments

 

             WHITE BLOOD CELL (test code = 7.3 K/mm3    4.5-12.5     N          

  



             WBC)                                                

 

             RED BLOOD CELL (test code = 5.05 mill/mm3 4.0-5.8      N           

 



             RBC)                                                

 

             HEMOGLOBIN (test code = HGB) 15.3 gram/dL 13.0-17.5    N           

 

 

             HEMATOCRIT (test code = HCT) 45.2 %       42.0-52.0    N           

 

 

             MEAN CELL VOLUME (test code = 89.5 fL      80-98        N          

  



             MCV)                                                

 

             MEAN CELL HGB (test code = MCH) 30.3 picogram 27.0-33.0    N       

     

 

             MEAN CELL HGB CONCETRATION 33.8 gram/dL 33.0-36.0    N            



             (test code = MCHC)                                        

 

             RED CELL DISTRIBUTION WIDTH 11.6 %       11.6-16.2    N            



             (test code = RDW)                                        

 

             RED CELL DISTRIBUTION WIDTH SD 38.8 fL      37.0-51.0    N         

   



             (test code = RDW-SD)                                        

 

             PLATELET COUNT (test code = 192 K/mm3    150-450      N            



             PLT)                                                

 

             MEAN PLATELET VOLUME (test code 10.2 fL      6.7-11.0     N        

    



             = MPV)                                              

 

             NEUTROPHIL % (test code = NT%) 53.6 %       39.0-69.0    N         

   

 

             LYMPHOCYTE % (test code = LY%) 36.0 %       25.0-55.0    N         

   

 

             MONOCYTE % (test code = MO%) 5.5 %        0.0-10.0     N           

 

 

             EOSINOPHIL % (test code = EO%) 3.4 %        0.0-5.0      N         

   

 

             BASOPHIL % (test code = BA%) 1.0 %        0.0-1.0      N           

 

 

             NEUTROPHIL # (test code = NT#) 3.93 K/mm3   1.8-7.7      N         

   

 

             LYMPHOCYTE # (test code = LY#) 2.64 K/mm3   1.0-5.0      N         

   

 

             MONOCYTE # (test code = MO#) 0.40 K/mm3   0-0.8        N           

 

 

             EOSINOPHIL # (test code = EO#) 0.25 K/mm3   0.0-0.5      N         

   

 

             BASOPHIL # (test code = BA#) 0.07 K/mm3   0.0-0.2      N           

 

 

             MANUAL DIFF REQUIRED (test code NO                                 

    



             = MDIFF)                                            



- XR CHEST 2 -05-15 08:13:00
************************************************************[facility]Name: WORKIZERTHOM         : 1985  
     Sex: M************************************************************  Name: 
THOM MELISSA [facility]    : 
1985 Age/S:35  /M            [address]           
Unit#:[id number]     Loc: [place] [address]               Phys:
Tommie Das MD                Acct#: [id number] Dis Date:             
     PHONE #: [phone]      Status: PRE ER                                  
FAX #: [phone]      Exam Date: 05/15/2021           Reason: SOB            
                                    EXAMS:       CPT CODE:      [id number] XR 
CHEST 2 V                               [id number]       REASON FOR EXAM: SOB        
      Exam Order Date: 5/15/2021 7:50 AM               Ordering:Tommie Das MD       Attending:Tommie Das MD       Location:[facility]            
  PROCEDURE:  - XR CHEST 2 V               COMPARISON: 2021               
FINDINGS:  PA and lateral views of the chest show patchy airspace       opacity 
of the bases. No evidence of effusion. The heart size is       within normal 
limits. Pulmonary vasculatures are unremarkable. The       osseous structures 
are grossly intact.                 IMPRESSION: Atelectasis of the bases        
 ** Electronically Signed by JED Moreno on 05/15/2021 at 0813 **              
       Reported and signed by: Girish Moreno M.D.                     CC: 
Tommie Das MD                                                          
        Technologist: Alfreda Muro                                   Trnscrpt 
Data: 05/15/2021 (813) [email or web address].VTL  Orig Print D/T: S: 05/15/2021 (0817)         
               PAGE  1                       Signed Report- XR RIBS UNI W/CXR 
3+V [id number] 13:25:00
************************************************************[facility]         : 1985        Sex:
M************************************************************                   
                               [place]:   St: REG-------------------
------------------------------------------------------------  Name:   
WORKIZERTHOM [place]                    : 1985  Age/S: 
35/M           [address] [place] [address]       Unit #: [id number]      Loc: [place] [address]                 Phys: Norberto Fortune MD                
                                 Acct: [id number] Dis Date:               
atus: REG ER                                 PHONE #: [phone]     Exam 
Date:                        FAX #: [phone]     Reason: pain 
s/p fall     EXAMS:                                               CPT CODE:     
[id number] XR RIBS UNI W/CXR3+V LT                   [address]221                    
Procedure: Left Rib Series.               ClinicalIndication: Left rib pain post
fall.               Comparison: Chest radiograph 2020.    FINDINGS:        
       The AP and oblique views of the left ribs, 5 views, show no definite   
rib fractures. The costovertebral junctions are unremarkable.               
There are no associated pleural effusions or pneumothorax. There are       no 
underlying pulmonary contusions noted.          IMPRESSION:         1. 
Unremarkable exam.                                       SL: OCO-H              
                                ** Electronically Signed by JED Dalton **         **              on 2021 at 1325             **        
             Reported and signed by: Eitan Dalton M.D.                CC:  
                                                                                
Technologist: RT Jackie(R); RT Gerard(R)          Mariah 
Date/Time/By: 2021 (8697) : By: Fatou           Orig Print D/T: S: 
2021 (0400)                         PAGE  1      Signed Report- CT 
HEAD/BRAIN W/O [id number] 14:40:00
************************************************************[facility]Name: THOM MELISSA         : 1985  
     Sex: M************************************************************  Name: 
THOM MELISSA                  [facility]     : 
1985 Age/S: 35  / M         [address]           Unit #: 
[id number]     Loc:          [place] [address]                Phys: Elver Sandhu DO                 Acct: [id number]  Dis Date:               Status: PRE ER
       PHONE #: [phone]     Exam Date: 2021  1428                    
FAX #: [phone]      Reason: blunt trauma                                   
    EXAMS:                  CPT CODE:      [id number] CT HEAD/BRAIN W/O CONT     
               [address]          HISTORY:  blunt trauma               TECHNIQUE: 
Noncontrast 2.5 mm axial CT of the head. Examination       acquired within 24 
hours of arrival. Automated exposure control for       dose reduction.          
    COMPARISON: CT scan of the brain 2019               FINDINGS:    
No lacerations or contusions of the scalp or facial soft tissues.        
Calvarium and skull base are intact.               No acute hemorrhage. No 
intracranial mass, mass effect, or midlineshift. No effacement of the sulci or 
grey-white matter interface.                No cortical atrophy.  No signs of 
white matter small-vessel disease.               No hydrocephalus.. No extra-
axial fluid collection.                Mucosal thickening in the ethmoid 
sinuses. Remaining paranasal sinuses     appear clear.        Postsurgical 
changes of mastoidectomy on the right side. Partial       opacification of the 
left mastoid air cells appear similar to the       prior exam.       Orbital 
contents are unremarkable.                          IMPRESSION:                 
 Negative CT head.          Location:           ** Electronically Signed by 
Mariano Milton MD on 2021 at 1440 **                    Reported and signed 
by: Mariano Milton MD      CC: Elver Sandhu DO  Technologist:Alfreda Muro          
          CTDI:        DLP:        Trnscb Date/Time: 2021(4290) [email or web address]
                      Orig Print D/T: S: 2021 (4752)      PAGE  1         
 Signed [id number] 13:33:00





             Test Item    Value        Reference Range Interpretation Comments

 

             GLUBED (test code = 313 mg/dL           H            Performe

d by certified



             GLUBED)                                              at [facility]



- XR CHEST 1 -44-91 11:32:00 FAX:         Calli Pacheco NP               
   [place]:    St: REG-------------------------------
------------------------------------------------  Name:   CLARAIZERTHOM      
          [facility]                     : 1985  Age/S: 35/M       
   [address]                Unit#: [id number]      Loc: [place]  [address]              Phys: Calli Pacheco NP                      
                     Acct: [id number] Dis Date:               Status: REG ER  
                              PHONE #: [phone]     Exam Date:     
2020     1115           FAX #: [phone]     Reason: SHORTNESS OF 
BREATH                                EXAMS:                                    
         CPT CODE:      [id number] XR CHEST 1 V             [address]                
   HISTORY: Shortness of breath.               COMPARISON: Chest x-ray from 
2020.               Location: [facility].               No acute 
infiltrates, effusion or congestion is noted. Suboptimal       inspiration with 
dependent changes. Mild cardiomegaly.             IMPRESSION:                   
No acute infiltrates, effusion or congestion.          ** Electronically Signed 
by JED Cleary on 2020 at 1132 **                      Reported and 
signed by: Rc Cleary M.D.                            CC: Calli Pacheco NP 
                                                                                
               Technologist: DENISA WORKMAN, RT(R)                        
   Trnscrd Date/Time/By: 2020 (3481) : By: [email or web address]           Orig Print 
D/T: S: 2020 (1134)                         PAGE  1                     
Signed Report- CT ABD PELVIS [id number] 11:23:00  Name: THOM MELISSA [facility]                     : 1985 Age/S: 35  / M
        [address]                Unit #: [id number]     Loc:             
 [place]  [address]              Phys: Calli Pacheco  NP                     
                            Acct: [id number]  Dis Date:               Status: 
REG ER                                  PHONE #: [phone]     Exam Date: 
2020  1111                     FAX #: [phone]      Reason: abdominal 
pain                                      EXAMS:                                
              CPTCODE:      [id number] CT ABD PELVIS W/CONT                      
[address]                    HISTORY: Abdominal pain.               COMPARISON: 
2019 and 2019.               Location: [facility].               CT
abdomen and pelvis with IV contrast: 100 mL of Isovue-370.       Automated 
exposure control.               CT of abdomen:               The lung bases are 
clear.               Hepatic parenchyma enhances homogeneously. No parenchymal 
mass or       nodules. Mild fatty infiltration. Portal vein and hepatic artery 
are       patent. Gallbladder is without radiopaque stones. The liver is       
measuring 25.4 cm in length.               The spleen is unremarkable. The 
stomach distended incompletely with       food however it is normal in 
appearance.               Pancreas is enhancing homogeneously. Unremarkable 
adrenals.               Kidneys are free from hydroureteronephrosis. Homogeneous
enhancement.       Bilateral excretion.               No pathologic adenopathy. 
Well-opacified abdominal and pelvic       vasculature.               No bowel 
obstruction. No diverticulitis. Circumferential wall       thickening of the 
left colon and to lesser extent on the right side       suggestive of colitis. 
No enteritis with normal small bowel loops.               CT PELVIS:Appendix is 
normal. Pelvic bowel loops are unobstructed. Thickened       right and left 
colonic walland the rectal wall. These findings       suggesting acute colitis. 
No free fluid or free air or abscess. Small       bowel loops are unremarkable. 
             Unremarkable incompletely distended urinary bladder. The prostate 
is       not enlarged. No pelvic pathologic adenopathy.               Subcu
taneous tissues and musculature are unremarkable. No lytic or       blastic 
lesions are noted withinthe bony skeleton.     PAGE  1                       
Signed Report                    (CONTINUED)   Name: THOM MELISSA [facility]                     : 1985 Age/S: 35/ M         
[address]                Unit #: [id number]     Loc:               
[place]  [id number]              Phys: Junior,Calli  NP                        
                         Acct: [id number]  Dis Date:               Status: REG
ER                                  PHONE #: [phone]     Exam Date: 
2020  1111                     FAX #: [phone]      Reason: abdominal 
pain                                      EXAMS:                                
              CPT CODE:      [id number] CT ABD PELVIS W/CONT                     
 [address]               &lt;Continued&gt;               IMPRESSION:                
  Mild circumferential wall thickening of the left colon and to lesser         
extent the right colon and rectum suggestive of segmental colitis.         Mild
inflammation. No free fluid, free air or abscess. Normal         appendix.      
            Fibrofatty infiltrated liver with hepatomegaly. No pathologic       
 adenopathy. No hydroureteronephrosis with unremarkable incompletely         
distended urinary bladder.          ** Electronically Signed byJED Cleary
on 2020 at 1123 **                      Reported and signed by: Rc Cleary M.D.                           CC: Calli Pacheco NP                   
                                                   Technologist:Ar Washington 
RT(R),(MR),(CT)   CTDI:        DLP:        Trnscb Date/Time: 2020 (1123) 
t.SDR.[email or web address]        Orig Print D/T: S: 2020 (112)      PAGE  2              
        Signed ReportB-TYPE NATRIURETIC [id number] 11:04:00





             Test Item    Value        Reference Range Interpretation Comments

 

             B-TYPE NATRIURETIC PEPTIDE 3.56 pgram/mL 0-100        N            



             (test code = BNP)                                        



BASIC METABOLIC [id number] 10:18:00





             Test Item    Value        Reference Range Interpretation Comments

 

             SODIUM (test code = 134 mmol/L   136-145      L            



             NA)                                                 

 

             POTASSIUM (test code 3.9 mmol/L   3.5-5.1      N            



             = K)                                                

 

             CHLORIDE (test code = 101.0 mmol/L        N            



             CL)                                                 

 

             CARBON DIOXIDE (test 22.0 mmol/L  21-32        N            



             code = CO2)                                         

 

             ANION GAP (test code 14.9         10-20        N            



             = GAP)                                              

 

             GLUCOSE (test code = 367 mg/dL           H            



             GLU)                                                

 

             BLOOD UREA NITROGEN 9 mg/dL      7-18         N            



             (test code = BUN)                                        

 

             GLOMERULAR FILTRATION > 60 mL/min  >=60                      Estima

abelardo GFR by



             RATE (test code =                                        using Madi

fied MDRD



             GFR)                                                formula.Chronic



                                                                 kidney disease 

is



                                                                 defined as eith

er



                                                                 kidney damageor

 GFR



                                                                 <60 mL/min/1.73

 m2



                                                                 for >3 months.

 

             CREATININE (test code 1.20 mg/dL   0.7-1.3      N            



             = CREAT)                                            

 

             BUN/CREATININE RATIO 7.5          10-20        L            



             (test code =                                        



             BUN/CREA)                                           

 

             CALCIUM (test code = 8.7 mg/dL    8.5-10.1     N            



             CA)                                                 



HEPATIC FUNCTION [id number] 10:18:00





             Test Item    Value        Reference Range Interpretation Comments

 

             TOTAL PROTEIN (test 7.3 gram/dL  6.4-8.2      N            



             code = PROT)                                        

 

             ALBUMIN (test code = 3.7 g/dL     3.4-5.0      N            



             ALB)                                                

 

             GLOBULIN (test code = 3.6 gram/dL  2.7-4.2      N            



             GLOB)                                               

 

             ALBUMIN/GLOBULIN RATIO 1.0          0.75-1.50    N            



             (test code = A/G)                                        

 

             BILIRUBIN TOTAL (test 0.50 mg/dL   0.0-1.0      N            



             code = BILT)                                        

 

             BILIRUBIN DIRECT (test 0.12 mg/dL   0.0-0.20     N            



             code = BILD)                                        

 

             SGOT/AST (test code = 31 IUnit/L   15-37        N            



             AST)                                                

 

             SGPT/ALT (test code = 55 IUnit/L   12-78        N            



             ALT)                                                

 

             ALKALINE PHOSPHATASE 70 IUnit/L          N            **Note 

change in



             TOTAL (test code =                                        reference

 range due



             ALKP)                                               to change in



                                                                 reagent.**



LACTIC DEHYDROGENASE(LDH)2020 10:18:00





             Test Item    Value        Reference Range Interpretation Comments

 

             LACTIC DEHYDROGENASE(LDH) (test 119 IUnit/L         N        

    



             code = LDH)                                         



[id number] 10:18:00





             Test Item    Value        Reference Range Interpretation Comments

 

             LIPASE (test code = LIP) 159 U/L      73.0-393.0   N            



[id number] 10:18:00





             Test Item    Value        Reference Range Interpretation Comments

 

             TROPONIN-I (test code = TROPI) <0.015 ng/mL 0-0.045      N         

   



[id number] 10:18:00





             Test Item    Value        Reference Range Interpretation Comments

 

             FERRITIN (test code = KASIA) 151 ng/mL    8-388        N            



COVID 19 INHOUSE [id number] 10:18:00





             Test Item    Value        Reference Range Interpretation Comments

 

             COVID 19 INHOUSE AG (test code = NEGATIVE                          

     



             [id number])                                        



CBC W/AUTO [id number] 10:17:00





             Test Item    Value        Reference Range Interpretation Comments

 

             WHITE BLOOD CELL (test code = 6.1 K/mm3    4.5-12.5     N          

  



             WBC)                                                

 

             RED BLOOD CELL (test code = 4.62 mill/mm3 4.0-5.8      N           

 



             RBC)                                                

 

             HEMOGLOBIN (test code = HGB) 14.4 gram/dL 13.0-17.5    N           

 

 

             HEMATOCRIT (test code = HCT) 41.9 %       42.0-52.0    L           

 

 

             MEAN CELL VOLUME (test code = 90.7 fL      80-98        N          

  



             MCV)                                                

 

             MEAN CELL HGB (test code = MCH) 31.2 picogram 27.0-33.0    N       

     

 

             MEAN CELL HGB CONCETRATION 34.4 gram/dL 33.0-36.0    N            



             (test code = MCHC)                                        

 

             RED CELL DISTRIBUTION WIDTH 11.9 %       11.6-16.2    N            



             (test code = RDW)                                        

 

             RED CELL DISTRIBUTION WIDTH SD 39.0 fL      37.0-51.0    N         

   



             (test code = RDW-SD)                                        

 

             PLATELET COUNT (test code = 182 K/mm3    150-450      N            



             PLT)                                                

 

             MEAN PLATELET VOLUME (test code 11.1 fL      6.7-11.0     H        

    



             = MPV)                                              

 

             NEUTROPHIL % (test code = NT%) 52.0 %       39.0-69.0    N         

   

 

             IMMATURE GRANULOCYTE % (test 0.8 %        0.0-5.0      N           

 



             code = IG%)                                         

 

             LYMPHOCYTE % (test code = LY%) 37.4 %       25.0-55.0    N         

   

 

             MONOCYTE % (test code = MO%) 6.3 %        0.0-10.0     N           

 

 

             EOSINOPHIL % (test code = EO%) 2.5 %        0.0-5.0      N         

   

 

             BASOPHIL % (test code = BA%) 1.0 %        0.0-1.0      N           

 

 

             NUCLEATED RBC % (test code = 1.0 %        0-0          H           

 



             NRBC%)                                              

 

             NEUTROPHIL # (test code = NT#) 3.15 K/mm3   1.8-7.7      N         

   

 

             IMMATURE GRANULOCYTE # (test 0.05 x10 3/uL 0-0.03       H          

  



             code = IG#)                                         

 

             LYMPHOCYTE # (test code = LY#) 2.26 K/mm3   1.0-5.0      N         

   

 

             MONOCYTE # (test code = MO#) 0.38 K/mm3   0-0.8        N           

 

 

             EOSINOPHIL # (test code = EO#) 0.15 K/mm3   0.0-0.5      N         

   

 

             BASOPHIL # (test code = BA#) 0.06 K/mm3   0.0-0.2      N           

 

 

             NUCLEATED RBC # (test code = 0.06 K/mm3   0.0-0.1      N           

 



             NRBC#)                                              

 

             MANUAL DIFF REQUIRED (test code NO                                 

    



             = MDIFF)                                            



C REACTIVE [id number] 10:14:00





             Test Item    Value        Reference Range Interpretation Comments

 

             C REACTIVE PROTEIN (test code = 0.93 mg/dL   0-0.3        H        

    



             CRP)                                                



CBC W/AUTO [id number] 10:13:00





             Test Item    Value        Reference Range Interpretation Comments

 

             WHITE BLOOD CELL (test code =  K/mm3       4.5-12.5                

  



             WBC)                                                

 

             RED BLOOD CELL (test code = RBC)  mill/mm3    4.0-5.8              

     

 

             HEMOGLOBIN (test code = HGB) 14.4 gram/dL 13.0-17.5    N           

 

 

             HEMATOCRIT (test code = HCT)  %           42.0-52.0                

 

 

             MEAN CELL VOLUME (test code =  fL          80-98                   

  



             MCV)                                                

 

             MEAN CELL HGB (test code = MCH)  picogram    27.0-33.0             

    

 

             MEAN CELL HGB CONCETRATION (test  gram/dL     33.0-36.0            

     



             code = MCHC)                                        

 

             RED CELL DISTRIBUTION WIDTH  %           11.6-16.2                 



             (test code = RDW)                                        

 

             RED CELL DISTRIBUTION WIDTH SD  fL          37.0-51.0              

   



             (test code = RDW-SD)                                        

 

             PLATELET COUNT (test code = PLT) 182 K/mm3    150-450      N       

     

 

             MEAN PLATELET VOLUME (test code  fL          6.7-11.0              

    



             = MPV)                                              

 

             NEUTROPHIL % (test code = NT%)  %           39.0-69.0              

   

 

             IMMATURE GRANULOCYTE % (test  %           0.0-5.0                  

 



             code = IG%)                                         

 

             LYMPHOCYTE % (test code = LY%)  %           25.0-55.0              

   

 

             MONOCYTE % (test code = MO%)  %           0.0-10.0                 

 

 

             EOSINOPHIL % (test code = EO%)  %           0.0-5.0                

   

 

             BASOPHIL % (test code = BA%)  %           0.0-1.0                  

 

 

             NEUTROPHIL # (test code = NT#)  K/mm3       1.8-7.7                

   

 

             LYMPHOCYTE # (test code = LY#)  K/mm3       1.0-5.0                

   

 

             MONOCYTE # (test code = MO#)  K/mm3       0-0.8                    

 

 

             EOSINOPHIL # (test code = EO#)  K/mm3       0.0-0.5                

   

 

             BASOPHIL # (test code = BA#)  K/mm3       0.0-0.2                  

 



BASIC METABOLIC [id number] 10:06:00





             Test Item    Value        Reference Range Interpretation Comments

 

             SODIUM (test code = NA) 134 mmol/L   136-145      L            

 

             POTASSIUM (test code = K) 3.9 mmol/L   3.5-5.1      N            

 

             CHLORIDE (test code = CL) 101.0 mmol/L        N            

 

             CARBON DIOXIDE (test code = CO2)  mmol/L      21-32                

     

 

             ANION GAP (test code = GAP)              10-20                     

 

             GLUCOSE (test code = GLU)  mg/dL                           

 

             BLOOD UREA NITROGEN (test code =  mg/dL       7-18                 

     



             BUN)                                                

 

             GLOMERULAR FILTRATION RATE (test  mL/min      >=60                 

     



             code = GFR)                                         

 

             CREATININE (test code = CREAT)  mg/dL       0.7-1.3                

   

 

             BUN/CREATININE RATIO (test code              1020                 

    



             = BUN/CREA)                                         

 

             CALCIUM (test code = CA)  mg/dL       8.5-10.1                  



HEPATIC FUNCTION [id number] 10:06:00





             Test Item    Value        Reference Range Interpretation Comments

 

             TOTAL PROTEIN (test code = PROT)  gram/dL     6.4-8.2              

     

 

             ALBUMIN (test code = ALB)  g/dL        3.4-5.0                   

 

             GLOBULIN (test code = GLOB)  gram/dL     2.7-4.2                   

 

             ALBUMIN/GLOBULIN RATIO (test code =              0.75-1.50         

        



             A/G)                                                

 

             BILIRUBIN TOTAL (test code = BILT)  mg/dL       0.0-1.0            

       

 

             BILIRUBIN DIRECT (test code = BILD)  mg/dL       0.0-0.20          

        

 

             SGOT/AST (test code = AST)  IUnit/L     15-37                     

 

             SGPT/ALT (test code = ALT)  IUnit/L     12-78                     

 

             ALKALINE PHOSPHATASE TOTAL (test  IUnit/L                    

     



             code = ALKP)                                        



LACTIC DEHYDROGENASE(LDH)2020 10:06:00





             Test Item    Value        Reference Range Interpretation Comments

 

             LACTIC DEHYDROGENASE(LDH) (test code  IUnit/L                

         



             = LDH)                                              



[id number] 10:06:00





             Test Item    Value        Reference Range Interpretation Comments

 

             LIPASE (test code = LIP)  U/L         73.0-393.0                



[id number] 10:06:00





             Test Item    Value        Reference Range Interpretation Comments

 

             TROPONIN-I (test code = TROPI)  ng/mL       0-0.045                

   



[id number] 10:06:00





             Test Item    Value        Reference Range Interpretation Comments

 

             FERRITIN (test code = KASIA)  ng/mL       8-388                     



- XR FOOT 3 + V RT2020-03-10 14:53:00 FAX: Jordon Doll DO   
[phone]    [place]:   St: REG-------------------------------
------------------------------------------------  Name:   WORKFLETCHERTHOM      
          [facility]                    : 1985  Age/S: 34/M       
   [address] [place] [address]         Unit#: [id number]      [place]: [place] [address]                 Phys: Jordon Barrow DO                      
                     Acct: [id number] Dis Date:               Status: REG ER  
                              PHONE #: [phone]     Exam Date:     
03/10/2020     1435           FAX #: [phone]     Reason: FOOT PAIN         
                                EXAMS:                                          
   CPT CODE:      [id number] XR FOOT 3 + V RT             39704                  
 PROCEDURE: Right foot 3 views               INDICATION: Foot pain post trauma. 
10 foot fall from a ladder.               COMPARISON: There are no previous 
relevant studies available for       correlation.               FINDINGS: 3 
views of the right foot were obtained utilizing portable       equipment.  The 
films are technically suboptimal.  Limited assessment     of the toes.  There is
no fracture, dislocation or other acute       skeletal abnormality.  Smoothly 
marginated osteophyte dorsal talus,       etiology undetermined.  There are 
small osteophyte anterior corner of       the tibia.  The joint space is 
maintained.  No gross joint effusion.        Small calcaneal enthesophyte at the
insertion of plantar fascia.  The       soft tissues are unremarkable and 
limitations of technique.                 IMPRESSION:          1.  Limited right
foot radiographs demonstrating no acute abnormality.         2.  Talar 
osteophyte, etiology undetermined.  Degenerative changes of         the ankle 
are possible.  If there is continued clinical concern,         further imaging 
options would include MRI.                   SL:  OLE                   ** 
Electronically Signed by JED Escamilla **           **             on 
03/10/2020 at 1453             **                  Reported and signed by: 
Shan Escamilla M.D.                CC: Jordon Barrow DO                 
Technologist: Ramona Fajardo RT(R); Danita PortreRT(R)         Trnscrd 
Date/Time/By: 03/10/2020 (7756) : By: Yash           Orig Print D/T: S: 
03/10/2020 (6146)           PAGE  1                       Signed Report- XR 
TIBIA/FIBULA 2 V RT2020-03-10 14:50:00 FAX: Jordon Doll DO   
[phone]    [place]:   St: REG-------------------------------
------------------------------------------------  Name:   THOM MELISSA      
          [facility]                    : 1985  Age/S: 34/M       
   [address] [place] [address]         Unit#: [id number]      Loc: [place] [address]                 Phys: Jordon Barrow DO                      
                     Acct: [id number] Dis Date:               Status: REG ER  
                              PHONE #: [phone]     Exam Date:     
03/10/2020     1435           FAX #: [phone]     Reason: LEG PAIN          
                                EXAMS:                                          
   CPT CODE:      [id number] XR TIBIA/FIBULA 2 V RT             02913            
       PROCEDURE: Right leg 2 views               INDICATION: Leg pain post 
trauma.  10 foot fall from ladder.               COMPARISON: There are no 
previous relevant studies available for       correlation.               
FINDINGS: No bone, joint or soft tissue abnormality demonstrated.               
 IMPRESSION: Normal radiographs.                   SL:  OLE          ** 
Electronically Signed by JED Escamilla **           **             on 
03/10/2020at 1450             **                      Reported and signed by: 
Shan Escamilla M.D.               CC: Jordon Barrow DO                       
                              Technologist: RT Carrol(R); Danita LariosRT(R)         Trnscrd Date/Time/By: 03/10/2020 (4430) : By: Yash   
       Orig Print D/T: S: 03/10/2020 (7425)                         PAGE  1     
                 Signed Report- XR FEMUR MIN 2 VWS RT2020-03-10 14:49:00 FAX: Jordon Doll DO   [phone]    [place]:   St: 
REG-------------------------------
------------------------------------------------  Name:   WORKIZERTHOM      
          [facility]                    : 1985  Age/S: 34/M       
   [address] [place] [address]         Unit#: [id number]      Loc: [place] [address]                 Phys: Jordon Barrow DO                      
                     Acct: [id number] Dis Date:               Status: REG ER  
                              PHONE #: [phone]     Exam Date:     
03/10/2020     1435           FAX #: [phone]     Reason: THIGH PAIN        
                                EXAMS:                                          
   CPT CODE:      [id number] XR FEMUR MIN 2 VWS RT             44735             
      PROCEDURE: Right femur 2 views               INDICATION: Thighpain post 
trauma.  10 foot fall from a ladder.               COMPARISON: Current pelvic 
radiographs             FINDINGS: No bone, joint or soft tissue abnormality 
demonstrated.                 IMPRESSION: Normal radiographs.                   
SL:  OLE                   ** Electronically Signed by JED Escamilla ** 
         **             on 03/10/2020 at 7578             **     Reported and 
signed by: Shan Escamilla M.D.                            CC: Jordon Barrow DO                Technologist: Ramona Fajardo RT(R); Danita PorterRT(R)  
      Trnscrd Date/Time/By: 03/10/2020 (0440) : By: Yash           Orig 
Print D/T: S: 03/10/2020 (2369)          PAGE  1                       Signed 
Report- XR L-SPINE 2/3 VIEWS2020-03-10 14:47:00 FAX: Jordon Doll DO 
 [phone]    [place]:   St: REG-------------------------------
------------------------------------------------  Name:   WORKIZERTHOM                    : 1985  Age/S: 34/M       
   [address] [place] [address]         Unit#: [id number]      Loc: [place], [place] [address]                 Phys: Jordon Barrow DO                      
                     Acct: [id number] Dis Date:               Status: REG ER  
                              PHONE #: [phone]     Exam Date:     
03/10/2020     1436           FAX #: [phone]     Reason: BACK PAIN         
                                EXAMS:                                          
   CPT CODE:      [id number] XR L-SPINE 2/3 VIEWS             51437              
     PROCEDURE: Lumbar spine AP and lateral radiographs.  3 views.             
INDICATION: BACK PAIN.               COMPARISON: CT abdomen and pelvis dated 
2019,2016.               FINDINGS: Normal alignment of the spine.  No 
evidence for acute bony       fracture or dislocation.  Mild degenerative 
changes.  Irregular       sclerotic osteophyte along the anterior inferior L1 
vertebral body       appears stable.  Surrounding soft tissues appear 
unremarkable.                 IMPRESSION: No acute abnormality identified.      
            SL: [id number]              ** Electronically Signed by JED Duran **         **                on 03/10/2020 at 1447         
     **                      Reported and signed by: Thom Duran M.D. 
                       CC: Jordon Barrow DO                                    
                                Technologist: Ramona Fajardo, RT(R); RT Froilan(R)         Trnscrd Date/Time/By: 03/10/2020 (1447) : By: [email or web address] 
Orig Print D/T: S: 03/10/2020 (4390)                         PAGE  1            
          Signed Report- XR PELVIS 1/2 VIEWS2020-03-10 14:47:00 FAX: Jordon Doll DO   [phone]    [place]:   St: 
REG-------------------------------
------------------------------------------------  Name:   THOM MELISSA                    : 1985  Age/S: 34/M       
   [address] [place] [address]         Unit#: [id number]      Loc: [place]        
[place] [address]                 Phys: Jordon Barrow DO                      
                     Acct: [id number] Dis Date:               Status: REG ER  
                              PHONE #: [phone]     Exam Date:     
03/10/2020     1435           FAX #: [phone]     Reason: PELVIC PAIN       
                                EXAMS:                                          
   CPT CODE:      [id number] XR PELVIS 1/2 VIEWS             46965               
    PROCEDURE: PELVIS SINGLE VIEW               INDICATION: Pelvicpain post 
trauma.  10 foot fall from ladder.               COMPARISON: Current right femur
radiographs               FINDINGS: The pelvic ring is intact.  The proximal 
femora are intact       and located.  No soft tissue abnormality demonstrated.  
            COMMENTS: If there is continued clinical concern, further imaging   
   options include CT and MRI.                 IMPRESSION: Normal radiograph.   
               SL:  OLE                   ** Electronically Signed by JED Escamilla **           **             on 03/10/2020 at 7569             **
                     Reported and signed by: Shan Escamilla M.D.              
         CC: Jordon Barrow DO                                                  
                                        Technologist: RT Carrol(R); 
RT Froilan(R)         Trnscmandy Date/Time/By: 03/10/2020 (0494) :By: 
Yash           Orig Print D/T: S: 03/10/2020 (3461)                        
PAGE  1              Signed Report- XR KNEE 1 OR 2 V RT2020-03-10 14:46:00 FAX: 
Jordon Doll DO   [phone]    [facility]: [employer]  St: 
REG-------------------------------
------------------------------------------------  Name:   THOM MELISSA                    : 1985  Age/S: 34/M       
   [address] [place] [address]         Unit#: [id number]      Loc: [place] [address]                 Phys: Jordon Barrow                        
                     Acct: [id number] Dis Date:               Status: REG ER  
                              PHONE #: [phone]     Exam Date:     
03/10/2020     1435           FAX #: [phone]     Reason: KNEE PAIN         
                                EXAMS:                                          
   CPT CODE:      [id number] XR KNEE 1 OR 2 V RT             70917               
    PROCEDURE: Right knee 2 views               INDICATION: Knee pain post 
trauma, fall from a ladder.               COMPARISON: There are no previous 
relevant studiesavailable for       correlation.               FINDINGS: No 
bone, joint or soft tissue abnormality demonstrated.                 IMPRESSION:
Normal radiographs.                   SL:  KL-H     ** Electronically Signed by 
JED Escamilla **           **             on 03/10/2020 at 1446          
  **                      Reported and signed by: Shan Escamilla M.D.         
CC: Jordon Barrow DO                                                 
Technologist: Ramona Fajardo RT(R); RT Froilan(R)         [facility] 
Date/Time/By: 03/10/2020 (9050) : By: JaylinL           Joseph Print D/T: S: 
03/10/2020 (3468)                         PAGE  1                       Signed 
Report- XR CHEST 2 -46-07 20:23:00 FAX: Michel Knowles MD 
[phone]    [place]:   St: PRE-------------------------------
------------------------------------------------  Name:   THOM MELISSA                    : 1985  Age/S: 34/M       
   [address] [place] [address]         Unit#: [id number]      [place]: [place] [address]                 Phys: Michel Umanzor MD                    
                       Acct: [id number] Dis Date:               Status: PRE ER
                                PHONE #: [phone]     Exam Date:     
2020           FAX #: [phone]     Reason: productive cough  
                                EXAMS:                                          
   CPT CODE:      [id number] XR CHEST 2 V             33789                    
PROCEDURE: Chest Radiograph.                Clinical Indication: Productive 
cough, chest congestion.               Comparison: Chest radiograph 2019.  
  FINDINGS:                The chest shows normal lung volumes without 
interstitial or airspace    opacities, pleural effusions or pneumothorax.       
                The heart size and pulmonary vasculature are normal. The trachea
is       midline. There are no clinically significant osseous abnormalities     
 noted.                  IMPRESSION:         1. No chest radiographic evidence 
of acute cardiopulmonary disease.                                       SL: 
OCO-H    ** Electronically Signed by JED Dalton **           **      
       on 2020 at              **                      Reported and 
signed by: Eitan Dalton M.D. CC: Michel Umanzor MD                       
                Technologist: RT Michelle(MIKEL)        Trnscrd 
Date/Time/By: 2020 () : By: SteffiTDO           Orig Print D/T: S: 
2020 ()                         PAGE  1                       Signed 
Report- CT ABD PELVIS [id number] 15:11:00  Name: THOM MELISSA [facility]                     : 1985 Age/S: 34  / M       
 [address] [facility]                Unit #: [id number]     Loc:               
[place]  [address]              Phys: Lady Limon NP              
                           Acct: [id number]  Dis Date:               Status: 
REG ER                                  PHONE #: [phone]     Exam Date: 
2019  1424                     FAX #: [phone]      Reason: L SIDE ABD
PAIN                                     EXAMS:                                 
             CPTCODE:      [id number] CT ABD PELVIS W/CONT                       
[address]                    REASON FOR EXAM: L SIDE ABD PAIN               EXAM 
ORDER DATE: 2019 10:33 AM               Ordering M.D.: Lady Limon NP               PROCEDURE:  - CT ABD PELVIS W/CONT  contrast-enhanced
axial CT images       were acquired through the abdomen/pelvis at 5 mm 
intervals.  Sagittal       and coronal reformatted images were generated.  
Automated exposure       control was utilized for this reduction.               
Phases of contrast: venous and delayed               COMPARISON: CT of the 
abdomen and pelvis 2019               FINDINGS:                
Visualized thorax: Normal.   Hepatobiliary system: Hepatomegaly. No masses or 
contour abnormality.       Gallbladder is withinnormal limits.               
Pancreas: Normal               Spleen: Normal               Adrenal glands: 
Normal               Genitourinary system: Normal               Gastrointestinal
tract and appendix: There are areas of fatty       metaplasia of the colonic 
submucosa which may be sequela of previous       infectious or inflammatory 
episodes. No acute findings. Specifically       no abnormal bowel distention or 
mural thickening or adjacent fat       stranding. Stomach and small intestine 
are within normal limits.               Abdominal vascular structures: Normal   
           Peritoneum and retroperitoneum: No free fluid or free air.  No 
omental       or mesenteric masses.  No abnormal lymphnodes.               
Musculoskeletal structures and abdominal wall: There are mild       degenerative
changes in the spine.                 IMPRESSION:     PAGE  1                   
   Signed Report                 (CONTINUED)   Name: THOM MELISSA [facility]     : 1985 Age/S: 34  / M         [address] [facility]
               Unit #: [id number]     Loc:               [place]  [address]   
          Phys: Lady Limon NP                        Acct: 
[id number]  Dis Date:               Status: REG ER               PHONE #: 
[phone]     Exam Date: 2019  1424                     FAX #: [phone]
[phone]      Reason: L SIDE ABD PAIN                                     EXAMS:    
                    CPT CODE:      [id number] CT ABD PELVIS W/CONT               
       [address]            &lt;Continued&gt;          No acute intra-abdominal 
process.         Fatty metaplasia ofthe colonic submucosa may represent sequela 
of         previous episodes of infection and/or inflammation.          ** 
Electronically Signed by Mariano Milton MD on 2019 at 1511 **   Reported and 
signed by: Mariano Milton MD                                   CC: Lady Limon  NP; Elver Sandhu DO              Technologist:Zabrina Naik 
RT(R)(CT)     CTDI:        DLP:        Trnscb Date/Time: 2019 (1511) 
t.SDR.RR31                       Orig Print D/T: S: 2019 (3364)      PAGE 
2                       Signed Report- CT HEAD/BRAIN W/O [id number] 14:36:00
 Name: WORKIZERTHOM [facility]                     :
1985 Age/S: 34  / M         [address] [facility]                Unit #: 
[id number]     Loc:               [place]  [address]              Phys: 
Lady Limon NP                                          Acct: 
[id number]  Dis Date:               Status: REG ER                            
     PHONE #: [phone]     Exam Date: 2019  3473                     
FAX #: [phone]      Reason: HEADACHE                                       
    EXAMS:                                               CPTCODE:      [id number]
CT HEAD/BRAIN W/O CONT                     [address]4705                    HISTORY:  HE
ADACHE               TECHNIQUE: Noncontrast 2.5 mm axial CT of the head. 
Examination       acquired within 24 hours of arrival. Automated exposure 
control for       dose reduction.               COMPARISON: Noncontrast CT brain
2019               FINDINGS:               No lacerations or contusions
of the scalp or facial soft tissues..        Calvarium and skull base are 
intact. No acute hemorrhage. No intracranial mass, mass effect, or midline      
shift. No effacement of the sulci or grey-white matter interface to       
suggest acute infarct.                No cortical atrophy.  No signs of white 
matter small-vessel disease.               No hydrocephalus.. No extra-axial
fluid collection.                Polypoid mucosal thickening in the bilateral 
maxillary sinuses, worse       on the left side.        Postsurgical changes of 
right-sided mastoidectomy. Partial       opacification of the left mastoid air 
cells, similar to prior exam.       Orbital contents are unremarkable.          
               IMPRESSION:                   No acute findings and no 
significant change from previous exam.         Partial opacification of the left
mastoid air cells. Correlate clinically for mastoiditis.          ** 
Electronically Signed by Mariano Milton MD on 2019 at 1436 **                
     Reported and signed by: Mariano Milton MD      CC: Lady Limon  NP;
Elver Sandhu DO        Technologist:Zabrina Naik RT(R)(CT)     CTDI:      
 DLP:        Trnscb Date/Time: 2019 (3426) t.SDR.RR31                     
 Orig Print D/T: S: 2019 (1811)      PAGE  1                 Signed Report
URINALYSIS [id number] 13:08:00





             Test Item    Value        Reference Range Interpretation Comments

 

             UA COLOR (test code = COLU) YELLOW       YELLOW                    

 

             UA APPEARANCE (test code = CLEAR        CLEAR                     



             APPU)                                               

 

             UA GLUCOSE DIPSTICK (test >1000 (4+) mg/dL NEGATIVE                

  



             code = DGLUU)                                        

 

             UA BILIRUBIN DIPSTICK (test NEGATIVE mg/dL NEGATIVE                

  



             code = BILU)                                        

 

             UA KETONE DIPSTICK (test NEGATIVE mg/dL NEGATIVE                  



             code = KETU)                                        

 

             UA SPECIFIC GRAVITY (test 1.028        1.001-1.035               



             code = SGU)                                         

 

             UA BLOOD DIPSTICK (test code Negative mg/dL NEGATIVE               

   



             = INDIO)                                              

 

             UA PH DIPSTICK (test code = 5.5          5.0-8.0                   



             GERRY)                                                

 

             UA PROTEIN DIPSTICK (test 30 (1+) mg/dL NEGATIVE     A            



             code = PROU)                                        

 

             UA UROBILINIOGEN DIPSTICK Normal mg/dL NEGATIVE                  



             (test code = URO)                                        

 

             UA NITRITE DIPSTICK (test NEGATIVE     NEGATIVE                  



             code = KRISTIN)                                        

 

             UA LEUKOCYTE ESTERASE W NEGATIVE Blayne/uL NEGATIVE                  



             REFLEX (test code = LEUUR)                                        

 

             UA WBC (test code = WBCU) 0-5 per HPF  0-5                       

 

             UA RBC (test code = RBCU) 0-2 #/HPF    0-5                       

 

             UA EPITHELIAL CELLS (test FEW per HPF  FEW                       



             code = EPIU)                                        

 

             UA BACTERIA (test code = FEW #/HPF    NONE                      



             BACU)                                               

 

             UA URIC ACID CRYSTALS (test FEW #/HPF    NONE                      



             code = URIU)                                        

 

             UA MUCUS (test code = MUCU) FEW #/LPF    FEW                       



Urine Source? Clean CatchDRUGS OF ABUSE SCREEN [id number] 13:08:00





             Test Item    Value        Reference Range Interpretation Comments

 

             URN COCAINE (test code = COCAURN) NEGATIVE     <300 ng/mL          

      

 

             URN CANNABINOIDS (test code = NEGATIVE     <50 ng/mL               

  



             CANNABURN)                                          

 

             URN AMPHETAMINE (test code = NEGATIVE     <1000 ng/mL              

 



             AMPHETURN)                                          

 

             URN BARBITURATE (test code = NEGATIVE     <200 ng/mL               

 



             BARBITURN)                                          

 

             URN BENZODIAZEPINE (test code = NEGATIVE     <200 ng/mL            

    



             BENZOURN)                                           

 

             URN OPIATES (test code = OPIATURN) NEGATIVE     <300 ng/mL         

       

 

             URN PHENCYCLIDINE (PCP) (test code = NEGATIVE     <25 ng/mL        

         



             PHENCURN)                                           

 

             URN METHADONE (test code = METHAURN) NEGATIVE     <300 ng/mL       

         



Urine Source? Clean CatchURINALYSIS [id number] 12:39:00





             Test Item    Value        Reference Range Interpretation Comments

 

             UA COLOR (test code = COLU)              YELLOW                    

 

             UA APPEARANCE (test code = APPU)              CLEAR                

     

 

             UA BILIRUBIN DIPSTICK (test code =              NEGATIVE           

       



             BILU)                                               

 

             UA SPECIFIC GRAVITY (test code =              1.001-1.035          

     



             SGU)                                                

 

             UA PH DIPSTICK (test code = GERRY)              5.0-8.0              

     

 

             UA UROBILINIOGEN DIPSTICK (test code  mg/dL       0.0-0.2          

         



             = URO)                                              

 

             UA NITRITE DIPSTICK (test code =              NEGATIVE             

     



             KRISTIN)                                               

 

             UA LEUKOCYTE ESTERASE W REFLEX (test              NEGATIVE         

         



             code = LEUUR)                                        

 

             UA WBC (test code = WBCU)  per HPF     0-5                       

 

             UA RBC (test code = RBCU)  per HPF     0-5                       

 

             UA EPITHELIAL CELLS (test code =  per HPF     Few                  

     



             EPIU)                                               

 

             UA BACTERIA (test code = BACU)  per HPF     NONE                   

   



Urine Source? Clean CatchDRUGS OF ABUSE SCREEN [id number] 12:39:00





             Test Item    Value        Reference Range Interpretation Comments

 

             URN COCAINE (test code = COCAURN) NEGATIVE     <300 ng/mL          

      

 

             URN CANNABINOIDS (test code = NEGATIVE     <50 ng/mL               

  



             CANNABURN)                                          

 

             URN AMPHETAMINE (test code = NEGATIVE     <1000 ng/mL              

 



             AMPHETURN)                                          

 

             URN BARBITURATE (test code = NEGATIVE     <200 ng/mL               

 



             BARBITURN)                                          

 

             URN BENZODIAZEPINE (test code = NEGATIVE     <200 ng/mL            

    



             BENZOURN)                                           

 

             URN OPIATES (test code = OPIATURN) NEGATIVE     <300 ng/mL         

       

 

             URN PHENCYCLIDINE (PCP) (test code = NEGATIVE     <25 ng/mL        

         



             PHENCURN)                                           

 

             URN METHADONE (test code = METHAURN) NEGATIVE     <300 ng/mL       

         



Urine Source? Clean CatchBASIC METABOLIC [id number] 11:59:00





             Test Item    Value        Reference Range Interpretation Comments

 

             SODIUM (test code = 134 mmol/L   136-145      L            



             NA)                                                 

 

             POTASSIUM (test code 4.9 mmol/L   3.5-5.1      N            



             = K)                                                

 

             CHLORIDE (test code = 101.0 mmol/L        N            



             CL)                                                 

 

             CARBON DIOXIDE (test 23.0 mmol/L  21-32        N            



             code = CO2)                                         

 

             ANION GAP (test code 14.9         10-20        N            



             = GAP)                                              

 

             GLUCOSE (test code = 251 mg/dL           H            



             GLU)                                                

 

             BLOOD UREA NITROGEN 9 mg/dL      7-18         N            



             (test code = BUN)                                        

 

             GLOMERULAR FILTRATION > 60 mL/min  >=60                      Estima

abelardo GFR by



             RATE (test code =                                        using Madi

fied MDRD



             GFR)                                                formula.Chronic



                                                                 kidney disease 

is



                                                                 defined as [facility]



                                                                 kidney damageor

 GFR



                                                                 <60 mL/min/1.73

 m2



                                                                 for >3 months.

 

             CREATININE (test code 1.10 mg/dL   0.7-1.3      N            



             = CREAT)                                            

 

             BUN/CREATININE RATIO 8.2          10-20        L            



             (test code =                                        



             BUN/CREA)                                           

 

             CALCIUM (test code = 9.3 mg/dL    8.5-10.1     N            



             CA)                                                 



HEPATIC FUNCTION [id number] 11:59:00





             Test Item    Value        Reference Range Interpretation Comments

 

             TOTAL PROTEIN (test 8.2 gram/dL  6.4-8.2      N            



             code = PROT)                                        

 

             ALBUMIN (test code = 3.9 g/dL     3.4-5.0      N            



             ALB)                                                

 

             GLOBULIN (test code = 4.3 gram/dL  2.7-4.2      H            



             GLOB)                                               

 

             ALBUMIN/GLOBULIN RATIO 0.9          0.75-1.50    N            



             (test code = A/G)                                        

 

             BILIRUBIN TOTAL (test 0.70 mg/dL   0.0-1.0      N            



             code = BILT)                                        

 

             BILIRUBIN DIRECT (test 0.08 mg/dL   0.0-0.20     N            



             code = BILD)                                        

 

             SGOT/AST (test code = 92 IUnit/L   15-37        H            



             AST)                                                

 

             SGPT/ALT (test code = 84 IUnit/L   12-78        H            



             ALT)                                                

 

             ALKALINE PHOSPHATASE 70 IUnit/L          N            **Note 

change in



             TOTAL (test code =                                        reference

 range due



             ALKP)                                               to change in



                                                                 reagent.**



[id number] 11:59:00





             Test Item    Value        Reference Range Interpretation Comments

 

             LIPASE (test code = LIP) 129 U/L      73.0-393.0   N            



BASIC METABOLIC [id number] 11:58:00





             Test Item    Value        Reference Range Interpretation Comments

 

             SODIUM (test code = 134 mmol/L   136-145      L            



             NA)                                                 

 

             POTASSIUM (test code 4.9 mmol/L   3.5-5.1      N            



             = K)                                                

 

             CHLORIDE (test code = 101.0 mmol/L        N            



             CL)                                                 

 

             CARBON DIOXIDE (test 23.0 mmol/L  21-32        N            



             code = CO2)                                         

 

             ANION GAP (test code 14.9         10-20        N            



             = GAP)                                              

 

             GLUCOSE (test code = 251 mg/dL           H            



             GLU)                                                

 

             BLOOD UREA NITROGEN 9 mg/dL      7-18         N            



             (test code = BUN)                                        

 

             GLOMERULAR FILTRATION > 60 mL/min  >=60                      Estima

abelardo GFR by



             RATE (test code =                                        using Madi

fied MDRD



             GFR)                                                formula.Chronic



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         [address] [place] [address]         Unit #: [id number]     Loc:               [place] [address]     Phys: Onur Mendez MD                                                 
      Acct: [id number]  Dis Date:               Status: REG ER                
                 PHONE #: [phone]     ExamDate: 2019           
         FAX #: [phone]      Reason: HEADACHE                           
EXAMS:                                               CPT CODE:      [id number] CT
HEAD/BRAIN W/O CONT                     47799               &lt;Continued&gt;   
    The lungapices reveal no acute process.                   IMPRESSION:       
           CT HEAD:      1.  There is no acute intracranial process.         2. 
There is decreased chronic paranasal sinus disease.         3.  There is a 
stable moderate left mastoid air cell and middle ear         effusion.          
        CT CERVICAL SPINE:                   1.  There is no acute cervical 
spine fracture or dislocation.         2.  There is moderate palatine tonsillar 
hypertrophy, likely reactive.                                       ** 
Electronically Signed by NAV Minor **            **            on 
2019 at 194            **                      Reported and signed by: 
NAV Mullins                    CC: Onur Mendez MD                           
                                           Technologist:George Garrido, RT(R); 
Marisela BEAN  CTDI:        DLP:        Trnscb Date/Time: 2019 () 
t.[email or web address].[id number]        Orig Print D/T: S: 2019 ()      PAGE  2             
         Signed Report- CT C-SPINE W/O [id number] 19:41:00  Name: 
HTOM MELISSA                    : 
1985 Age/S: 34  / M         [address] [place] [address]         Unit #: 
[id number]     Loc:               [place], [id number]                 Phys: 
Onur Mendez MD                                                        Acct: 
[id number]  Dis Date:               Status: REG ER                            
     PHONE #: [phone]     Exam Date: 2019                     
FAX #: [phone]      Reason: NECK PAIN                                      
    EXAMS:                                               CPTCODE:      [id number]
CT C-SPINE W/O CONT                        10318                    UNENHANCED C
T HEAD, UNENHANCED CT CERVICAL SPINE               INDICATION: Head pain and 
neck pain after an injury.  Altered mental       status               TECHNIQUE:
Unenhanced CT was performed from the skull vertex to the       foramen magnum 
with axial, coronal and sagittal reconstructions.        Radiationdose length 
product 472 mGy-cm.  Unenhanced CT was performed       of the cervical spine 
with axial,coronal and sagittal       reconstructions.  CT imaging performed at 
this location utilizes       radiation dose optimization technique which 
includes one or more of       the followin) Automated exposure control; 2) 
Adjustment of the mA       and/or kV according to patient's size; 3) Use of 
iterative       reconstruction techniques.  DLP (mGy-cm): 330               
COMPARISONS: CT head and cervical spine 2010               FINDINGS:       
       CT HEAD:       There is decreased paranasalsinus disease with mild 
mucosal       thickening of the maxillary, ethmoid and frontal sinuses but no 
fluid       level.               There chronic surgical changes of right 
mastoidectomy.  There is a      stable moderate left mastoid air cell and middle
ear effusion.                There is no acute depressed skull fracture.        
      The cerebral ventricles are normal caliber. There is no cerebral mass     
 effect, midline shift, intracranial hemorrhage or acute large vessel       
territorycerebral cortical edema.                 CT CERVICAL SPINE:       There
is an incidental pseudoarthrosis of the left craniocervical       junction, a 
developmental segmentation anomaly.  There is no acute       cervical spine 
fracture or dislocation.  There is no spinal canal       stenosis.    There is 
moderate palatine tonsillar hypertrophy.  There is no       peritonsillar 
abscess detected.               There is no cervical lymphadenopathy, mass or 
organized fluid       collection.         PAGE  1                       Signed 
Report                    (CONTINUED)   Name: THOM MELISSA                  
[facility]                    : 1985 Age/S: 34  / M         [address] [place] [address]         Unit #: [id number]     Loc:               [place] [address]     Phys: Onur Mendez MD                                                 
      Acct: [id number]  Dis Date:               Status: REG ER                
                 PHONE #: [phone]     ExamDate: 2019           
         FAX #: [phone]      Reason: NECK PAIN                           
EXAMS:                                               CPT CODE:      [id number] CT
C-SPINE W/O CONT                        69264               &lt;Continued&gt;   
    The lungapices reveal no acute process.                   IMPRESSION:       
           CT HEAD:      1.  There is no acute intracranial process.         2. 
There is decreased chronic paranasal sinus disease.         3.  There is a 
stable moderate left mastoid air cell and middle ear         effusion.          
        CT CERVICAL SPINE:                   1.  There is no acute cervical 
spine fracture or dislocation.         2.  There is moderate palatine tonsillar 
hypertrophy, likely reactive.                                       ** 
Electronically Signed by NAV Minor **            **            on 
2019 at 1941            **                      Reported and signed by: 
NAV Mullins                    CC: Onur Mendez MD                           
                                           Technologist:George Garrido, RT(R); 
Marisela BEAN  CTDI:        DLP:        Trnscb Date/Time: 2019 () 
[id number]        Orig Print D/T: S: 2019 ()      PAGE  2             
         Signed [id number] 18:52:00





             Test Item    Value        Reference Range Interpretation Comments

 

             ALCOHOL (test code < 0.003 G/dL <0.003                    Ethyl Alc

ohol



             = ALC)                                              Interpretation:



                                                                  0.100 gm/dL   

    -



                                                                 Legally Intoxic

ated



                                                                      0.300-0.40

0 gm/dL



                                                                 - Severely Into

xicated



                                                                         >0.400 

gm/dL



                                                                   - Potentially



                                                                 LethalResults a

re for



                                                                 Medical purpose

s only,



                                                                 and not for Leg

al



                                                                 orEmployment ev

aluation



                                                                 purposes.



CHEMISTRY 8 [id number] 18:37:00





             Test Item    Value        Reference Range Interpretation Comments

 

             ISTAT-SODIUM (test code = NAP)  MMOL/L      134-147                

   

 

             ISTAT-POTASSIUM (test code = KP)  MMOL/L      3.4-5.0              

     

 

             ISTAT-CHLORIDE (test code = CLP)  MMOL/L      100-108              

     

 

             ISTAT CARBON DIOXIDE (test code =  mmol/L      21-33        N      

      



             ISTAT-CO2)                                          

 

             ISTAT CALCIUM IONIZED (test code =  MG/DL       1.12-1.32          

       



             ISTAT-CARRINGTON)                                          

 

             ISTAT-GLUCOSE (test code = GLUP)  MG/DL              H       

     

 

             ISTAT-BUN (test code = BUNP)  MG/DL       7-18         N           

 

 

             BEDSIDE CREATININE (test code =  MG/DL       0.6-1.3      N        

    



             CREATBED)                                           

 

             GLOMERULAR FILTRATION RATE POC 82 ML/MIN                           

   



             (test code = GFRBED)                                        



CHEMISTRY 8 [id number] 18:37:00





             Test Item    Value        Reference Range Interpretation Comments

 

             ISTAT-SODIUM (test 133 MMOL/L   134-147      L            



             code = NAP)                                         

 

             ISTAT-POTASSIUM (test 4.2 MMOL/L   3.4-5.0      N            



             code = KP)                                          

 

             ISTAT-CHLORIDE (test 101 MMOL/L   100-108      N            Perform

ed by



             code = CLP)                                         certified opera

tor



                                                                 at  [facility]

 

             ISTAT CARBON DIOXIDE 22.0 mmol/L  21-33        N            



             (test code =                                        



             ISTAT-CO2)                                          

 

             ISTAT CALCIUM IONIZED 1.15 MG/DL   1.12-1.32    N            



             (test code =                                        



             ISTAT-CARRINGTON)                                          

 

             ISTAT-GLUCOSE (test 174 MG/DL           H            



             code = GLUP)                                        

 

             ISTAT-BUN (test code = 9 MG/DL      7-18         N            



             BUNP)                                               

 

             BEDSIDE CREATININE 1.1 MG/DL    0.6-1.3      N            



             (test code = CREATBED)                                        

 

             GLOMERULAR FILTRATION 82 ML/MIN                              



             RATE POC (test code =                                        



             GFRBED)                                             



- XR CHEST 1 -07-78 18:37:00 FAX: Onur Kapoor MD         [phone]
   [place]:   St: REG-------------------------------
------------------------------------------------  Name:   THOM MELISSA      
          [facility]                    : 1985  Age/S: 34/M       
   [address] [place] [address]         Unit#: [id number]      Loc: [place] [address]                 Phys: Onur Mendez MD                            
               Acct: [id number] Dis Date:               Status: REG ER        
                        PHONE #: [phone]     Exam Date:     2019     
1836           FAX #: [phone]     Reason: CHEST PAIN                       
                 EXAMS:                                              CPT CODE:  
   [id number] XR CHEST 1 V             80449                    Single portable 
AP chest               INDICATION: Acute chestpain post injury.               
COMPARISON: 2015 chest radiographs               FINDINGS: Examlimited by 
patient body habitus and portable technique.        The cardiomediastinal 
silhouette is upper limits of normal for size       or mildly enlarged.  The 
lungs are underinflated crowding the   bronchovascular markings but otherwise 
appear clear.  No pleural       effusion identified.  No acute bony finding 
identified.                 IMPRESSION: No acute findings.   SL: SG-H           
        ** Electronically Signed by JED Quiroz **            **       
on 2019 at 1837            **                      Reported and signed by:
Celestino Quiroz M.D.                       CC: Onur Mendez MD                      
                                              Technologist: MIKEL Martins(R)R                                Trnscrd Date/Time/By: 2019 () : 
By: [email or web address]  Orig Print D/T: S: 2019 (1840)                         PAGE
 1                       Signed ReportCBC W/AUTO [id number] 18:36:00





             Test Item    Value        Reference Range Interpretation Comments

 

             WHITE BLOOD CELL (test code = 9.94 x10 3/uL 4.5-11.0     N         

   



             WBC)                                                

 

             RED BLOOD CELL (test code = 4.65 x10 6/uL 4.00-5.60    N           

 



             RBC)                                                

 

             HEMOGLOBIN (test code = HGB) 14.4 g/dL    12.5-16.9    N           

 

 

             HEMATOCRIT (test code = HCT) 41.7 %       37.5-50.7    N           

 

 

             MEAN CELL VOLUME (test code = 89.7 fL      81.0-99.0    N          

  



             MCV)                                                

 

             MEAN CELL HGB (test code = MCH) 31.0 pg      27.0-33.0    N        

    

 

             MEAN CELL HGB CONCETRATION 34.5 g/dL    33.0-37.0    N            



             (test code = MCHC)                                        

 

             RED CELL DISTRIBUTION WIDTH CV 11.9 %       11.5-14.5    N         

   



             (test code = RDW)                                        

 

             RED CELL DISTRIBUTION WIDTH SD 39.2 fL      37.0-54.0    N         

   



             (test code = RDW-SD)                                        

 

             PLATELET COUNT (test code = 214 x10 3/uL 150-400      N            



             PLT)                                                

 

             MEAN PLATELET VOLUME (test code 10.9 fL      7.0-9.0      H        

    



             = MPV)                                              

 

             NEUTROPHIL % (test code = NT%) 54.4 %       56.0-77.0    L         

   

 

             IMMATURE GRANULOCYTE % (test 0.8 %        0.0-2.0      N           

 



             code = IG%)                                         

 

             LYMPHOCYTE % (test code = LY%) 34.4 %       14.0-32.0    H         

   

 

             MONOCYTE % (test code = MO%) 7.1 %        4.8-9.0      N           

 

 

             EOSINOPHIL % (test code = EO%) 2.1 %        0.3-3.7      N         

   

 

             BASOPHIL % (test code = BA%) 1.2 %        0.0-2.0      N           

 

 

             NUCLEATED RBC % (test code = 0.0 %        0-0          N           

 



             NRBC%)                                              

 

             NEUTROPHIL # (test code = NT#) 5.40 x10 3/uL 2.0-7.6      N        

    

 

             IMMATURE GRANULOCYTE # (test 0.08 x10 3/uL 0.00-0.03    H          

  



             code = IG#)                                         

 

             LYMPHOCYTE # (test code = LY#) 3.42 x10 3/uL 1.0-3.8      N        

    

 

             MONOCYTE # (test code = MO#) 0.71 x10 3/uL 0.1-0.8      N          

  

 

             EOSINOPHIL # (test code = EO#) 0.21 x10 3/uL 0.0-0.2      H        

    

 

             BASOPHIL # (test code = BA#) 0.12 x10 3/uL 0.0-0.2      N          

  

 

             NUCLEATED RBC # (test code = 0.00 x10 3/uL 0.0-0.1      N          

  



             NRBC#)                                              

 

             MANUAL DIFF REQUIRED (test code NO                                 

    



             = MDIFF)                                            



COMPREHENSIVE METABOLIC [id number] 11:48:00





             Test Item    Value        Reference Range Interpretation Comments

 

             SODIUM (test code = 138 mmol/L   135-148      N            



             NA)                                                 

 

             POTASSIUM (test code = 4.0 mmol/L   3.5-5.1      N            



             K)                                                  

 

             CHLORIDE (test code = 103 mmol/L   101-109      N            



             CL)                                                 

 

             CARBON DIOXIDE (test 25.3 mmol/L  21-32        N            



             code = CO2)                                         

 

             ANION GAP (test code = 14 mmol/L    10-20        N            



             GAP)                                                

 

             GLUCOSE (test code = 185 mg/dL           H            



             GLU)                                                

 

             BLOOD UREA NITROGEN 12 mg/dL     3-21         N            



             (test code = BUN)                                        

 

             CREATININE (test code 1.32 mg/dL   0.55-1.3     H            



             = CREAT)                                            

 

             BUN/CREATININE RATIO 9.1          10-20        L            



             (test code = BUN/CREA)                                        

 

             TOTAL PROTEIN (test 7.8 g/dL     6.5-8.4      N            



             code = PROT)                                        

 

             ALBUMIN (test code = 4.0 g/dL     3.4-4.8      N            



             ALB)                                                

 

             GLOBULIN (test code = 3.8 G/DL     1-10         N            



             GLOB)                                               

 

             ALBUMIN/GLOBULIN RATIO 1.1 RATIO    0.75-1.50    N            



             (test code = A/G)                                        

 

             CALCIUM (test code = 8.4 mg/dL    8.4-10.2     N            



             CA)                                                 

 

             BILIRUBIN TOTAL (test 0.50 mg/dL   0.0-1.0      N            



             code = BILT)                                        

 

             SGOT/AST (test code = 27 U/L       6-32         N            



             AST)                                                

 

             SGPT/ALT (test code = 53 U/L       12-78        N            Note: 

Change in



             ALT)                                                REFERENCE RANGE

 due



                                                                 to new reagent



                                                                 method.

 

             ALKALINE PHOSPHATASE 55 U/L              N            



             TOTAL (test code =                                        



             ALKP)                                               



[id number] 11:48:00





             Test Item    Value        Reference Range Interpretation Comments

 

             LIPASE (test code = LIP) 168 U/L      128-270      N            



[id number] 11:48:00





             Test Item    Value        Reference Range Interpretation Comments

 

             TROPONIN-I (test code = TROPI) <0.015 ng/mL 0.00-0.056   N         

   



COMPREHENSIVE METABOLIC [id number] 11:23:00





             Test Item    Value        Reference Range Interpretation Comments

 

             SODIUM (test code = 138 mmol/L   135-148      N            



             NA)                                                 

 

             POTASSIUM (test code = 4.0 mmol/L   3.5-5.1      N            



             K)                                                  

 

             CHLORIDE (test code = 103 mmol/L   101-109      N            



             CL)                                                 

 

             CARBON DIOXIDE (test 25.3 mmol/L  21-32        N            



             code = CO2)                                         

 

             ANION GAP (test code = 14 mmol/L    10-20        N            



             GAP)                                                

 

             GLUCOSE (test code = 185 mg/dL           H            



             GLU)                                                

 

             BLOOD UREA NITROGEN 12 mg/dL     3-21         N            



             (test code = BUN)                                        

 

             CREATININE (test code 1.32 mg/dL   0.55-1.3     H            



             = CREAT)                                            

 

             BUN/CREATININE RATIO 9.1          10-20        L            



             (test code = BUN/CREA)                                        

 

             TOTAL PROTEIN (test 7.8 g/dL     6.5-8.4      N            



             code = PROT)                                        

 

             ALBUMIN (test code = 4.0 g/dL     3.4-4.8      N            



             ALB)                                                

 

             GLOBULIN (test code = 3.8 G/DL     1-10         N            



             GLOB)                                               

 

             ALBUMIN/GLOBULIN RATIO 1.1 RATIO    0.75-1.50    N            



             (test code = A/G)                                        

 

             CALCIUM (test code = 8.4 mg/dL    8.4-10.2     N            



             CA)                                                 

 

             BILIRUBIN TOTAL (test 0.50 mg/dL   0.0-1.0      N            



             code = BILT)                                        

 

             SGOT/AST (test code = 27 U/L       6-32         N            



             AST)                                                

 

             SGPT/ALT (test code = 53 U/L       12-78        N            Note: 

Change in



             ALT)                                                REFERENCE RANGE

 due



                                                                 to new reagent



                                                                 method.

 

             ALKALINE PHOSPHATASE 55 U/L              N            



             TOTAL (test code =                                        



             ALKP)                                               



[id number] 11:23:00





             Test Item    Value        Reference Range Interpretation Comments

 

             LIPASE (test code = LIP) 168 U/L      128-270      N            



[id number] 11:23:00





             Test Item    Value        Reference Range Interpretation Comments

 

             TROPONIN-I (test code = TROPI)  ng/mL       0.00-0.056   N         

   



- CT ABD PELVIS W/O [id number] 11:21:00  Name: WORKIZERTHOM [facility]     : 1985 Age/S: 33  / M         
[address] [facility]           Unit #: [id number]     Loc:               
[place] [address]                Phys: Elise Batres MD                      
                           Acct: [id number]  Dis Date:               Status: 
REG ER                                  PHONE #: [phone]     Exam Date: 
2019  1107                     FAX #: [phone]      Reason: severe l
eft sided pain radiating to abdomen         EXAMS:                              
                CPTCODE:      [id number] CT ABD PELVIS W/O CONT                  
  [id number]                    HISTORY: Severe left-sided pain radiating to the 
abdomen.               COMPARISON: February 3, 2019.   CT abdomen and pelvis: 
Stone protocol. Automated exposure control.               CT ABDOMEN:         
The lung bases are clear.               Noncontrast liver is within normal 
limits. No discrete mass is noted.       The liver is measuring 25.4 cm in 
length. Gallbladder is without       radiopaque stones.               
Unremarkable spleen. The stomach distended incompletely but it is       normal 
in appearance.               Noncontrast pancreas is unremarkable. Adrenals are 
normal.      Right kidney is free from hydroureteronephrosis. No calyceal 
stones.       Worsening moderate left hydroureteronephrosis from previous exam. 
             No pathologic adenopathy. No bowel obstruction or colitis or       
diverticulitis or enteritis.               CT PELVIS:               Appendix is 
normal. Pelvic bowel loops are unobstructed.                Left hydroureter 
with 4.8 mm obstructing stone in the mid iliac level       noted again is 
unchanged from previous examination with worsening       hydroureter. 
Unremarkable incompletely distended urinary bladder. The       prostate is not 
enlarged. No pelvic pathologic adenopathy. No free       fluid or free air.     
         Subcutaneous tissues and the musculature are normal in appearance. No  
    lytic or blastic lesions are noted within the bony skeleton.                
IMPRESSION:                   Worsening moderate and lefthydroureteronephrosis 
with obstructing         stone measuring 4.8 mm in the distal left ureter at the
mid iliac         level which is also unchanged in position. No calyceal stones.
No         hydroureteronephrosis on the right. Unremarkable incompletely        
distended urinary bladder.     PAGE  1                       Signed Report      
             (CONTINUED)   [facility]     : 1985 Age/S: 33  / M         [address] [facility]       
   Unit #: [id number]     Loc:               [place] [address]                
Phys: Elise Braxton MD                                                  Acct: 
[id number]  Dis Date:      Status: REG ER                                  
PHONE #: [phone]     Exam Date: 2019  1107                     FAX #:
[phone]      Reason: severe left sided pain radiating to abdomen         
EXAMS:                                               CPT CODE:      [id number] CT
ABD PELVIS W/O CONT                     [id number]               &lt;Continued&gt;   
                Normal appendix without bowel obstruction or colitis or 
diverticulitis         or enteritis. Hepatomegaly.  ** Electronically Signed by 
JED Cleary on 2019 at 1121 **                      Reported and 
signed by: Rc Cleary M.D.                                   CC: Elise Batres MD        Technologist:SHEMAR GILLIS, RT(R),CT       CTDI:        
DLP:        Trnscb Date/Time: 2019 (1121) t.SDR.TH4                       
Orig Print D/T: S: 2019 (1124)       CTDI:   DLP:          PAGE  2        
              Signed ReportCOMPREHENSIVE METABOLIC [id number] 11:12:00





             Test Item    Value        Reference Range Interpretation Comments

 

             SODIUM (test code = NA) 138 mmol/L   135-148      N            

 

             POTASSIUM (test code = K) 4.0 mmol/L   3.5-5.1      N            

 

             CHLORIDE (test code = CL) 103 mmol/L   101-109      N            

 

             CARBON DIOXIDE (test code = CO2) 25.3 mmol/L  21-32        N       

     

 

             ANION GAP (test code = GAP) 14 mmol/L    10-20        N            

 

             GLUCOSE (test code = GLU) 185 mg/dL           H            

 

             BLOOD UREA NITROGEN (test code = 12 mg/dL     3-21         N       

     



             BUN)                                                

 

             CREATININE (test code = CREAT) 1.32 mg/dL   0.55-1.3     H         

   

 

             BUN/CREATININE RATIO (test code = 9.1          10-20        L      

      



             BUN/CREA)                                           

 

             TOTAL PROTEIN (test code = PROT)  gram/dL     6.4-8.2              

     

 

             ALBUMIN (test code = ALB)  g/dL        3.4-5.0                   

 

             GLOBULIN (test code = GLOB)  g/dL        2.7-4.2                   

 

             ALBUMIN/GLOBULIN RATIO (test code              0.75-1.50           

      



             = A/G)                                              

 

             CALCIUM (test code = CA) 8.4 mg/dL    8.4-10.2     N            

 

             BILIRUBIN TOTAL (test code =  mg/dL       0.2-1.2                  

 



             BILT)                                               

 

             SGOT/AST (test code = AST)  IUnit/L     15-37                     

 

             SGPT/ALT (test code = ALT)  U/L         10-69                     

 

             ALKALINE PHOSPHATASE TOTAL (test  IUnit/L                    

     



             code = ALKP)                                        



[id number] 11:12:00





             Test Item    Value        Reference Range Interpretation Comments

 

             LIPASE (test code = LIP)  Unit/L      144-286                   



[id number] 11:12:00





             Test Item    Value        Reference Range Interpretation Comments

 

             TROPONIN-I (test code = TROPI)  ng/mL       0-0.045                

   



URINALYSIS [id number] 11:04:00





             Test Item    Value        Reference Range Interpretation Comments

 

             UA COLOR (test code STRAW        YELLOW                    



             = COLU)                                             

 

             UA APPEARANCE (test CLEAR        CLEAR                     



             code = APPU)                                        

 

             UA GLUCOSE DIPSTICK NORMAL mg/dL NEGATIVE                  



             (test code = DGLUU)                                        

 

             UA BILIRUBIN NEGATIVE mg/dL NEGATIVE                  



             DIPSTICK (test code                                        



             = BILU)                                             

 

             UA KETONE DIPSTICK neg mg/dL    NEGATIVE                  



             (test code = KETU)                                        

 

             UA SPECIFIC GRAVITY 1.010        1.001-1.035               



             (test code = SGU)                                        

 

             UA BLOOD DIPSTICK 150 (3+) Gume/uL NEGATIVE     A            



             (test code = INDIO)                                        

 

             UA PH DIPSTICK (test 5.0          5.0-8.0                   



             code = GERRY)                                         

 

             UA PROTEIN DIPSTICK neg mg/dL    Neg-15                    



             (test code = PROU)                                        

 

             UA UROBILINIOGEN norm mg/dL   0.0-0.2                   



             DIPSTICK (test code                                        



             = URO)                                              

 

             UA NITRITE DIPSTICK NEGATIVE     NEGATIVE                  



             (test code = KRISTIN)                                        

 

             UA LEUKOCYTE NEGATIVE uL  NEGATIVE                  



             ESTERASE DIPSTICK                                        



             (test code = LEUU)                                        

 

             UA WBC (test code = 0-5 per HPF  0-5                       IN SOME 

URINARY



             WBCU)                                               TRACT INFECTION

S



                                                                 THERE MAY NOT B

E



                                                                 ENOUGHWBCs IN T

HE



                                                                 URINE TO TRIGGE

R AN



                                                                 AUTOMATIC (REFL

EX)



                                                                 URINECULTURE. A



                                                                 SEPERATE ORDER 

FOR



                                                                 URINE CULTURE I

S



                                                                 RECOMMENDEDIF T

HERE



                                                                 IS STRONG SUPPO

RT



                                                                 FOR A URINARY T

RACT



                                                                 INFECTIONCLINIC

ALLY



                                                                 .

 

             UA RBC (test code = 6-10 per HPF 0-5                       



             RBCU)                                               

 

             UA EPITHELIAL CELLS Rare (0-1/hpf) Few                       



             (test code = EPIU) per HPF                                

 

             UA BACTERIA (test TRACE per HPF NONE                      



             code = BACU)                                        

 

             UA MUCUS (test code FEW per LPF  NONE-FEW                  



             = MUCU)                                             



Urine Source? Clean CatchDRUGS OF ABUSE SCREEN [id number] 11:04:00





             Test Item    Value        Reference Range Interpretation Comments

 

             URN COCAINE (test code = COCAURN) NEGATIVE     NEGATIVE            

      

 

             URN CANNABINOIDS (test code = NEGATIVE     NEGATIVE                

  



             CANNABURN)                                          

 

             URN AMPHETAMINE (test code = NEGATIVE     NEGATIVE                 

 



             AMPHETURN)                                          

 

             URN BARBITURATE (test code = NEGATIVE     NEGATIVE                 

 



             BARBITURN)                                          

 

             URN BENZODIAZEPINE (test code = NEGATIVE     NEGATIVE              

    



             BENZOURN)                                           

 

             URN OPIATES (test code = OPIATURN) NEGATIVE     NEGATIVE           

       

 

             URN PHENCYCLIDINE (PCP) (test code = NEGATIVE     NEGATIVE         

         



             PHENCURN)                                           



Urine Source? Clean CatchCBC W/AUTO [id number] 11:00:00





             Test Item    Value        Reference Range Interpretation Comments

 

             WHITE BLOOD CELL (test code = 7.7 K/mm3    4.5-12.5     N          

  



             WBC)                                                

 

             RED BLOOD CELL (test code = 4.26 mill/mm3 4.0-5.8      N           

 



             RBC)                                                

 

             HEMOGLOBIN (test code = HGB) 13.1 gram/dL 13.0-17.5    N           

 

 

             HEMATOCRIT (test code = HCT) 38.5 %       42.0-52.0    L           

 

 

             MEAN CELL VOLUME (test code = 90.4 fL      80-98        N          

  



             MCV)                                                

 

             MEAN CELL HGB (test code = MCH) 30.8 picogram 27.0-33.0    N       

     

 

             MEAN CELL HGB CONCETRATION 34.0 gram/dL 33.0-36.0    N            



             (test code = MCHC)                                        

 

             RED CELL DISTRIBUTION WIDTH 12.3 %       11.6-16.2    N            



             (test code = RDW)                                        

 

             RED CELL DISTRIBUTION WIDTH SD 39.8 fL      39.2-49.5    N         

   



             (test code = RDW-SD)                                        

 

             PLATELET COUNT (test code = 178 K/mm3    150-450      N            



             PLT)                                                

 

             MEAN PLATELET VOLUME (test code 10.6 fL      6.7-11.0     N        

    



             = MPV)                                              

 

             NEUTROPHIL % (test code = NT%) 53.9 %       39.0-69.0    N         

   

 

             LYMPHOCYTE % (test code = LY%) 33.6 %       25.0-55.0    N         

   

 

             MONOCYTE % (test code = MO%) 7.8 %        0.0-10.0     N           

 

 

             EOSINOPHIL % (test code = EO%) 3.9 %        0.0-5.0      N         

   

 

             BASOPHIL % (test code = BA%) 0.8 %        0.0-1.0      N           

 

 

             NEUTROPHIL # (test code = NT#) 4.17 K/mm3   1.8-7.7      N         

   

 

             LYMPHOCYTE # (test code = LY#) 2.60 K/mm3   1.0-5.0      N         

   

 

             MONOCYTE # (test code = MO#) 0.60 K/mm3   0-0.8        N           

 

 

             EOSINOPHIL # (test code = EO#) 0.30 K/mm3   0.0-0.5      N         

   

 

             BASOPHIL # (test code = BA#) 0.06 K/mm3   0.0-0.2      N           

 

 

             MANUAL DIFF REQUIRED (test code NO                                 

    



             = MDIFF)                                            



URINALYSIS [id number] 10:59:00





             Test Item    Value        Reference Range Interpretation Comments

 

             UA COLOR (test code STRAW        YELLOW                    



             = COLU)                                             

 

             UA APPEARANCE (test CLEAR        CLEAR                     



             code = APPU)                                        

 

             UA GLUCOSE DIPSTICK NORMAL mg/dL NEGATIVE                  



             (test code = DGLUU)                                        

 

             UA BILIRUBIN NEGATIVE mg/dL NEGATIVE                  



             DIPSTICK (test code                                        



             = BILU)                                             

 

             UA KETONE DIPSTICK neg mg/dL    NEGATIVE                  



             (test code = KETU)                                        

 

             UA SPECIFIC GRAVITY 1.010        1.001-1.035               



             (test code = SGU)                                        

 

             UA BLOOD DIPSTICK 150 (3+) Gume/uL NEGATIVE     A            



             (test code = INDIO)                                        

 

             UA PH DIPSTICK (test 5.0          5.0-8.0                   



             code = GERRY)                                         

 

             UA PROTEIN DIPSTICK neg mg/dL    Neg-15                    



             (test code = PROU)                                        

 

             UA UROBILINIOGEN norm mg/dL   0.0-0.2                   



             DIPSTICK (test code                                        



             = URO)                                              

 

             UA NITRITE DIPSTICK NEGATIVE     NEGATIVE                  



             (test code = KRISTIN)                                        

 

             UA LEUKOCYTE NEGATIVE uL  NEGATIVE                  



             ESTERASE DIPSTICK                                        



             (test code = LEUU)                                        

 

             UA WBC (test code = 0-5 per HPF  0-5                       IN SOME 

URINARY



             WBCU)                                               TRACT INFECTION

S



                                                                 THERE MAY NOT B

E



                                                                 ENOUGHWBCs IN T

HE



                                                                 URINE TO TRIGGE

R AN



                                                                 AUTOMATIC (REFL

EX)



                                                                 URINECULTURE. A



                                                                 SEPERATE ORDER 

FOR



                                                                 URINE CULTURE I

S



                                                                 RECOMMENDEDIF T

HERE



                                                                 IS STRONG SUPPO

RT



                                                                 FOR A URINARY T

RACT



                                                                 INFECTIONCLINIC

ALLY



                                                                 .

 

             UA RBC (test code = 6-10 per HPF 0-5                       



             RBCU)                                               

 

             UA EPITHELIAL CELLS Rare (0-1/hpf) Few                       



             (test code = EPIU) per HPF                                

 

             UA BACTERIA (test TRACE per HPF NONE                      



             code = BACU)                                        

 

             UA MUCUS (test code FEW per LPF  NONE-FEW                  



             = MUCU)                                             



Urine Source? Clean CatchDRUGS OF ABUSE SCREEN [id number] 10:59:00





             Test Item    Value        Reference Range Interpretation Comments

 

             URN COCAINE (test code = COCAURN)              NEGATIVE            

      

 

             URN CANNABINOIDS (test code =              NEGATIVE                

  



             CANNABURN)                                          

 

             URN AMPHETAMINE (test code = AMPHETURN)              NEGATIVE      

            

 

             URN BARBITURATE (test code = BARBITURN)              NEGATIVE      

            

 

             URN BENZODIAZEPINE (test code =              NEGATIVE              

    



             BENZOURN)                                           

 

             URN OPIATES (test code = OPIATURN)              NEGATIVE           

       

 

             URN PHENCYCLIDINE (PCP) (test code =              NEGATIVE         

         



             PHENCURN)                                           



Urine Source? Clean CatchURINALYSIS [id number] 10:56:00





             Test Item    Value        Reference Range Interpretation Comments

 

             UA COLOR (test code = COLU) STRAW        YELLOW                    

 

             UA APPEARANCE (test code = CLEAR        CLEAR                     



             APPU)                                               

 

             UA GLUCOSE DIPSTICK (test NORMAL mg/dL NEGATIVE                  



             code = DGLUU)                                        

 

             UA BILIRUBIN DIPSTICK (test NEGATIVE mg/dL NEGATIVE                

  



             code = BILU)                                        

 

             UA KETONE DIPSTICK (test code neg mg/dL    NEGATIVE                

  



             = KETU)                                             

 

             UA SPECIFIC GRAVITY (test 1.010        1.001-1.035               



             code = SGU)                                         

 

             UA BLOOD DIPSTICK (test code 150 (3+) Gume/uL NEGATIVE     A        

    



             = INDIO)                                              

 

             UA PH DIPSTICK (test code = 5.0          5.0-8.0                   



             GERRY)                                                

 

             UA PROTEIN DIPSTICK (test neg mg/dL    Neg-15                    



             code = PROU)                                        

 

             UA UROBILINIOGEN DIPSTICK norm mg/dL   0.0-0.2                   



             (test code = URO)                                        

 

             UA NITRITE DIPSTICK (test NEGATIVE     NEGATIVE                  



             code = KRISTIN)                                        

 

             UA LEUKOCYTE ESTERASE NEGATIVE uL  NEGATIVE                  



             DIPSTICK (test code = LEUU)                                        

 

             UA WBC (test code = WBCU)  per HPF     0-5                       



Urine Source? Clean CatchDRUGS OF ABUSE SCREEN [id number] 10:56:00





             Test Item    Value        Reference Range Interpretation Comments

 

             URN COCAINE (test code = COCAURN)              NEGATIVE            

      

 

             URN CANNABINOIDS (test code =              NEGATIVE                

  



             CANNABURN)                                          

 

             URN AMPHETAMINE (test code = AMPHETURN)              NEGATIVE      

            

 

             URN BARBITURATE (test code = BARBITURN)              NEGATIVE      

            

 

             URN BENZODIAZEPINE (test code =              NEGATIVE              

    



             BENZOURN)                                           

 

             URN OPIATES (test code = OPIATURN)              NEGATIVE           

       

 

             URN PHENCYCLIDINE (PCP) (test code =              NEGATIVE         

         



             PHENCURN)                                           



Urine Source? Clean CatchURINALYSIS [id number] 20:23:00





             Test Item    Value        Reference Range Interpretation Comments

 

             UA COLOR (test code LIGHT YELLOW YELLOW                    



             = COLU)                                             

 

             UA APPEARANCE (test HAZY         CLEAR        A            



             code = APPU)                                        

 

             UA GLUCOSE DIPSTICK 50 (Trace)   NEGATIVE     A            



             (test code = DGLUU) mg/dL                                  

 

             UA BILIRUBIN NEGATIVE mg/dL NEGATIVE                  



             DIPSTICK (test code                                        



             = BILU)                                             

 

             UA KETONE DIPSTICK neg mg/dL    NEGATIVE                  



             (test code = KETU)                                        

 

             UA SPECIFIC GRAVITY 1.020        1.001-1.035               



             (test code = SGU)                                        

 

             UA BLOOD DIPSTICK 250 (4+) Gume/uL NEGATIVE     A            



             (test code = INDIO)                                        

 

             UA PH DIPSTICK (test 5.0          5.0-8.0                   



             code = GERRY)                                         

 

             UA PROTEIN DIPSTICK 100 (2+) mg/dL Neg-15       A            



             (test code = PROU)                                        

 

             UA UROBILINIOGEN norm mg/dL   0.0-0.2                   



             DIPSTICK (test code                                        



             = URO)                                              

 

             UA NITRITE DIPSTICK NEGATIVE     NEGATIVE                  



             (test code = KRISTIN)                                        

 

             UA LEUKOCYTE 25 (Trace) uL NEGATIVE     A            



             ESTERASE DIPSTICK                                        



             (test code = LEUU)                                        

 

             UA WBC (test code = 0-5 per HPF  0-5                       IN SOME 

URINARY



             WBCU)                                               TRACT INFECTION

S



                                                                 THERE MAY NOT B

E



                                                                 ENOUGHWBCs IN T

HE



                                                                 URINE TO TRIGGE

R AN



                                                                 AUTOMATIC (REFL

EX)



                                                                 URINECULTURE. A



                                                                 SEPERATE ORDER 

FOR



                                                                 URINE CULTURE I

S



                                                                 RECOMMENDEDIF T

HERE



                                                                 IS STRONG SUPPO

RT



                                                                 FOR A URINARY T

RACT



                                                                 INFECTIONCLINIC

ALLY



                                                                 .

 

             UA RBC (test code = 25-50 per HPF 0-5          A            



             RBCU)                                               

 

             UA EPITHELIAL CELLS Rare (0-1/hpf) Few                       



             (test code = EPIU) per HPF                                

 

             UA BACTERIA (test TRACE per HPF NONE                      



             code = BACU)                                        

 

             UA URIC ACID MANY per LPF NONE         A            



             CRYSTALS (test code                                        



             = URIU)                                             

 

             UA TRIPLE PHOSPHATE MODERATE per NONE         A            



             CRYSTALS (test code LPF                                    



             = TRPHOSU)                                          



Urine Source? Clean CatchURINALYSIS [id number] 20:09:00





             Test Item    Value        Reference Range Interpretation Comments

 

             UA COLOR (test code = COLU) LIGHT YELLOW YELLOW                    

 

             UA APPEARANCE (test code = HAZY         CLEAR        A            



             APPU)                                               

 

             UA GLUCOSE DIPSTICK (test 50 (Trace) mg/dL NEGATIVE     A          

  



             code = DGLUU)                                        

 

             UA BILIRUBIN DIPSTICK (test NEGATIVE mg/dL NEGATIVE                

  



             code = BILU)                                        

 

             UA KETONE DIPSTICK (test neg mg/dL    NEGATIVE                  



             code = KETU)                                        

 

             UA SPECIFIC GRAVITY (test 1.020        1.001-1.035               



             code = SGU)                                         

 

             UA BLOOD DIPSTICK (test code 250 (4+) Gume/uL NEGATIVE     A        

    



             = INDIO)                                              

 

             UA PH DIPSTICK (test code = 5.0          5.0-8.0                   



             GERRY)                                                

 

             UA PROTEIN DIPSTICK (test 100 (2+) mg/dL Neg-15       A            



             code = PROU)                                        

 

             UA UROBILINIOGEN DIPSTICK norm mg/dL   0.0-0.2                   



             (test code = URO)                                        

 

             UA NITRITE DIPSTICK (test NEGATIVE     NEGATIVE                  



             code = KRISTIN)                                        

 

             UA LEUKOCYTE ESTERASE 25 (Trace) uL NEGATIVE     A            



             DIPSTICK (test code = LEUU)                                        

 

             UA WBC (test code = WBCU)  per HPF     0-5                       



Urine Source? Clean CatchURINALYSIS [id number] 13:10:00





             Test Item    Value        Reference Range Interpretation Comments

 

             UA COLOR (test code YELLOW       YELLOW                    



             = COLU)                                             

 

             UA APPEARANCE (test CLEAR        CLEAR                     



             code = APPU)                                        

 

             UA GLUCOSE DIPSTICK NORMAL mg/dL NEGATIVE                  



             (test code = DGLUU)                                        

 

             UA BILIRUBIN NEGATIVE mg/dL NEGATIVE                  



             DIPSTICK (test code                                        



             = BILU)                                             

 

             UA KETONE DIPSTICK neg mg/dL    NEGATIVE                  



             (test code = KETU)                                        

 

             UA SPECIFIC GRAVITY 1.025        1.001-1.035               



             (test code = SGU)                                        

 

             UA BLOOD DIPSTICK 250 (4+) Gume/uL NEGATIVE     A            



             (test code = INDIO)                                        

 

             UA PH DIPSTICK (test 5.0          5.0-8.0                   



             code = GERRY)                                         

 

             UA PROTEIN DIPSTICK 100 (2+) mg/dL Neg-15       A            



             (test code = PROU)                                        

 

             UA UROBILINIOGEN norm mg/dL   0.0-0.2                   



             DIPSTICK (test code                                        



             = URO)                                              

 

             UA NITRITE DIPSTICK NEGATIVE     NEGATIVE                  



             (test code = KRISTIN)                                        

 

             UA LEUKOCYTE 25 (Trace) uL NEGATIVE     A            



             ESTERASE DIPSTICK                                        



             (test code = LEUU)                                        

 

             UA WBC (test code = 0-5 per HPF  0-5                       IN SOME 

URINARY



             WBCU)                                               TRACT INFECTION

S



                                                                 THERE MAY NOT B

E



                                                                 ENOUGHWBCs IN T

HE



                                                                 URINE TO TRIGGE

R AN



                                                                 AUTOMATIC (REFL

EX)



                                                                 URINECULTURE. A



                                                                 SEPERATE ORDER 

FOR



                                                                 URINE CULTURE I

S



                                                                 RECOMMENDEDIF T

HERE



                                                                 IS STRONG SUPPO

RT



                                                                 FOR A URINARY T

RACT



                                                                 INFECTIONCLINIC

ALLY



                                                                 .

 

             UA RBC (test code = 11-20 per HPF 0-5                       



             RBCU)                                               

 

             UA EPITHELIAL CELLS None seen per Few                       



             (test code = EPIU) HPF                                    

 

             UA BACTERIA (test FEW per HPF  NONE                      



             code = BACU)                                        

 

             UA URIC ACID MANY per LPF NONE                      



             CRYSTALS (test code                                        



             = URIU)                                             



URINALYSIS W/O [id number] 13:10:00





             Test Item    Value        Reference Range Interpretation Comments

 

             UA MICROSCOPIC NEEDED? (test code = YES                            

        



             UAMICRO)                                            



URINALYSIS [id number] 13:05:00





             Test Item    Value        Reference Range Interpretation Comments

 

             UA COLOR (test code = COLU) YELLOW       YELLOW                    

 

             UA APPEARANCE (test code = CLEAR        CLEAR                     



             APPU)                                               

 

             UA GLUCOSE DIPSTICK (test NORMAL mg/dL NEGATIVE                  



             code = DGLUU)                                        

 

             UA BILIRUBIN DIPSTICK (test NEGATIVE mg/dL NEGATIVE                

  



             code = BILU)                                        

 

             UA KETONE DIPSTICK (test code neg mg/dL    NEGATIVE                

  



             = KETU)                                             

 

             UA SPECIFIC GRAVITY (test 1.025        1.001-1.035               



             code = SGU)                                         

 

             UA BLOOD DIPSTICK (test code 250 (4+) Gume/uL NEGATIVE     A        

    



             = INDIO)                                              

 

             UA PH DIPSTICK (test code = 5.0          5.0-8.0                   



             GERRY)                                                

 

             UA PROTEIN DIPSTICK (test 100 (2+) mg/dL Neg-15       A            



             code = PROU)                                        

 

             UA UROBILINIOGEN DIPSTICK norm mg/dL   0.0-0.2                   



             (test code = URO)                                        

 

             UA NITRITE DIPSTICK (test NEGATIVE     NEGATIVE                  



             code = KRISTIN)                                        

 

             UA LEUKOCYTE ESTERASE 25 (Trace) uL NEGATIVE     A            



             DIPSTICK (test code = LEUU)                                        

 

             UA WBC (test code = WBCU)  per HPF     0-5                       



URINALYSIS W/O [id number] 13:05:00





             Test Item    Value        Reference Range Interpretation Comments

 

             UA MICROSCOPIC NEEDED? (test code = YES                            

        



             UAMICRO)                                            



URINALYSIS [id number] 13:05:00





             Test Item    Value        Reference Range Interpretation Comments

 

             UA COLOR (test code = COLU) YELLOW       YELLOW                    

 

             UA APPEARANCE (test code = CLEAR        CLEAR                     



             APPU)                                               

 

             UA GLUCOSE DIPSTICK (test NORMAL mg/dL NEGATIVE                  



             code = DGLUU)                                        

 

             UA BILIRUBIN DIPSTICK (test NEGATIVE mg/dL NEGATIVE                

  



             code = BILU)                                        

 

             UA KETONE DIPSTICK (test code neg mg/dL    NEGATIVE                

  



             = KETU)                                             

 

             UA SPECIFIC GRAVITY (test 1.025        1.001-1.035               



             code = SGU)                                         

 

             UA BLOOD DIPSTICK (test code 250 (4+) Gume/uL NEGATIVE     A        

    



             = INDIO)                                              

 

             UA PH DIPSTICK (test code = 5.0          5.0-8.0                   



             GERRY)                                                

 

             UA PROTEIN DIPSTICK (test 100 (2+) mg/dL Neg-15       A            



             code = PROU)                                        

 

             UA UROBILINIOGEN DIPSTICK norm mg/dL   0.0-0.2                   



             (test code = URO)                                        

 

             UA NITRITE DIPSTICK (test NEGATIVE     NEGATIVE                  



             code = KRISTIN)                                        

 

             UA LEUKOCYTE ESTERASE 25 (Trace) uL NEGATIVE     A            



             DIPSTICK (test code = LEUU)                                        

 

             UA WBC (test code = WBCU)  per HPF     0-5                       



URINALYSIS W/O [id number] 13:05:00





             Test Item    Value        Reference Range Interpretation Comments

 

             UA MICROSCOPIC NEEDED? (test code = YES                            

        



             UAMICRO)                                            



[id number] 12:37:00





             Test Item    Value        Reference Range Interpretation Comments

 

             LIPASE (test code = LIP) 214 U/L      128-270      N            



[id number] 12:37:00





             Test Item    Value        Reference Range Interpretation Comments

 

             MAGNESIUM (test code = MAG) 1.9 mg/dL    1.6-2.3      N            



[id number] 12:37:00





             Test Item    Value        Reference Range Interpretation Comments

 

             TROPONIN-I (test code = TROPI) <0.015 ng/mL 0.00-0.056   N         

   



COMPREHENSIVE METABOLIC [id number] 12:30:00





             Test Item    Value        Reference Range Interpretation Comments

 

             SODIUM (test code = 138 mmol/L   135-148      N            



             NA)                                                 

 

             POTASSIUM (test code = 4.1 mmol/L   3.5-5.1      N            



             K)                                                  

 

             CHLORIDE (test code = 100 mmol/L   101-109      L            



             CL)                                                 

 

             CARBON DIOXIDE (test 23.4 mmol/L  21-32        N            



             code = CO2)                                         

 

             ANION GAP (test code = 19 mmol/L    10-20        N            



             GAP)                                                

 

             GLUCOSE (test code = 177 mg/dL           H            



             GLU)                                                

 

             BLOOD UREA NITROGEN 18 mg/dL     3-21         N            



             (test code = BUN)                                        

 

             CREATININE (test code 1.79 mg/dL   0.55-1.3     H            



             = CREAT)                                            

 

             BUN/CREATININE RATIO 10.1         10-20        N            



             (test code = BUN/CREA)                                        

 

             TOTAL PROTEIN (test 8.4 g/dL     6.5-8.4      N            



             code = PROT)                                        

 

             ALBUMIN (test code = 4.2 g/dL     3.4-4.8      N            



             ALB)                                                

 

             GLOBULIN (test code = 4.2 G/DL     1-10         N            



             GLOB)                                               

 

             ALBUMIN/GLOBULIN RATIO 1.0 RATIO    0.75-1.50    N            



             (test code = A/G)                                        

 

             CALCIUM (test code = 8.2 mg/dL    8.4-10.2     L            



             CA)                                                 

 

             BILIRUBIN TOTAL (test 0.40 mg/dL   0.0-1.0      N            



             code = BILT)                                        

 

             SGOT/AST (test code = 52 U/L       6-32         H            



             AST)                                                

 

             SGPT/ALT (test code = 75 U/L       12-78        N            Note: 

Change in



             ALT)                                                REFERENCE RANGE

 due



                                                                 to new reagent



                                                                 method.

 

             ALKALINE PHOSPHATASE 69 U/L              N            



             TOTAL (test code =                                        



             ALKP)                                               



[id number] 12:20:00





             Test Item    Value        Reference Range Interpretation Comments

 

             D-DIMER (test code = DDIMER) < 100 ng/ml  < 600                    

 



- CT ABD PELVIS W/O [id number] 12:16:00  [facility]     : 1985 Age/S: 33  / M         
[facility]           Unit #: [id number]     Loc:               
[place] [address]                Phys: Tommie Das MD                  
                           Acct: [id number]  Dis Date:               Status: 
REG ER                                  PHONE #: [phone]     Exam Date: 
2019  1200                     FAX #: [phone]      Reason: L flank
pain, h/o kidney stone                      EXAMS:                              
                CPTCODE:      [id number] CT ABD PELVIS W/O CONT                  
  [id number]                    HISTORY: Left flank pain, h/o kidney stone           
   TECHNIQUE:  5mm axial CT images were obtained through the abdomen and       
pelvis without contrast. Sagittal and coronal reformatted images were       
generated. Automated exposure control for dose reduction.                
COMPARISON: 18 FINDINGS:                Mild right basilar dependent 
subsegmental atelectasis. Normal heart       size.               Hepatomegaly 
with diffuse fatty infiltration. Nonenhanced gallbladder,       pancreas, 
spleen, and adrenal glands are unremarkable.               Obstructing 3 mm left
ureteral calculus at the level of the iliac       vessels; mild left 
hydronephrosis and renal parenchymal edema.           Nonenhanced right kidney 
and collecting system unremarkable.               Limited evaluation the GI 
tract without oral contrast. Stomach, small       bowel, appendix, and colon are
unremarkable.               No free air or free fluid. No lymphadenopathy. 
Abdominal aorta is       normal incaliber.               Urinary bladder is 
unremarkable. Seminal vesicles and prostate gland       are unremarkable. No 
pelvic free fluid.               Mild degenerative changes of the spine, 
sacroiliac joints, and hips.                         IMPRESSION:                
   Obstructing 3 mm left ureteral calculus at the level of the iliac         
vessels; mild left hydronephrosis and renal parenchymal edema                   
          ** Electronically Signed by Adwoa Reyes D.O. on 2019 at 1216 **   
                  Reported and signed by: Adwoa Reyes D.O.       PAGE  1          
            Signed Report                    (CONTINUED)   Name: 
THOM MELISSA [facility]     : 
1985 Age/S: 33  / M         [facility]           Unit #: [id number]
[id number]     Loc:               [place] [address]                Phys: 
Tommie Das MD                                       Acct: [id number] 
Dis Date:               Status: REG ER                              PHONE #: 
[phone]     Exam Date: 2019  1208   FAX #: [phone]      Reason: 
L flank pain, h/o kidney stone                      EXAMS:                      
                 CPT CODE:      [id number] CT ABD PELVIS W/O CONT       [id number]    
          &lt;Continued&gt;                                       CC: 
Tommie Das MD                     Technologist:Alfreda Muro             
       CTDI:        DLP:        Trnscb Date/Time: 2019 (2926) t.SDR.LDP1  
                    Orig Print D/T: S: 2019 (8046) CTDI:          DLP:    
     PAGE  2                       Signed ReportCBC W/AUTO [id number] 
12:07:00





             Test Item    Value        Reference Range Interpretation Comments

 

             WHITE BLOOD CELL (test code = 8.0 K/mm3    4.5-12.5     N          

  



             WBC)                                                

 

             RED BLOOD CELL (test code = 4.73 mill/mm3 4.0-5.8      N           

 



             RBC)                                                

 

             HEMOGLOBIN (test code = HGB) 14.7 gram/dL 13.0-17.5    N           

 

 

             HEMATOCRIT (test code = HCT) 42.4 %       42.0-52.0    N           

 

 

             MEAN CELL VOLUME (test code = 89.6 fL      80-98        N          

  



             MCV)                                                

 

             MEAN CELL HGB (test code = MCH) 31.1 picogram 27.0-33.0    N       

     

 

             MEAN CELL HGB CONCETRATION 34.7 gram/dL 33.0-36.0    N            



             (test code = MCHC)                                        

 

             RED CELL DISTRIBUTION WIDTH 12.5 %       11.6-16.2    N            



             (test code = RDW)                                        

 

             RED CELL DISTRIBUTION WIDTH SD 40.2 fL      39.2-49.5    N         

   



             (test code = RDW-SD)                                        

 

             PLATELET COUNT (test code = 183 K/mm3    150-450      N            



             PLT)                                                

 

             MEAN PLATELET VOLUME (test code 10.7 fL      6.7-11.0     N        

    



             = MPV)                                              

 

             NEUTROPHIL % (test code = NT%) 55.3 %       39.0-69.0    N         

   

 

             LYMPHOCYTE % (test code = LY%) 30.6 %       25.0-55.0    N         

   

 

             MONOCYTE % (test code = MO%) 10.9 %       0.0-10.0     H           

 

 

             EOSINOPHIL % (test code = EO%) 2.9 %        0.0-5.0      N         

   

 

             BASOPHIL % (test code = BA%) 0.3 %        0.0-1.0      N           

 

 

             NEUTROPHIL # (test code = NT#) 4.41 K/mm3   1.8-7.7      N         

   

 

             LYMPHOCYTE # (test code = LY#) 2.44 K/mm3   1.0-5.0      N         

   

 

             MONOCYTE # (test code = MO#) 0.87 K/mm3   0-0.8        H           

 

 

             EOSINOPHIL # (test code = EO#) 0.23 K/mm3   0.0-0.5      N         

   

 

             BASOPHIL # (test code = BA#) 0.02 K/mm3   0.0-0.2      N           

 

 

             MANUAL DIFF REQUIRED (test code NO                                 

    



             = MDIFF)

## 2022-05-17 NOTE — EKG
Test Date:    2022-05-16               Test Time:    15:47:16

Technician:   KV                                     

                                                     

MEASUREMENT RESULTS:                                       

Intervals:                                           

Rate:         82                                     

AR:           130                                    

QRSD:         102                                    

QT:           372                                    

QTc:          434                                    

Axis:                                                

P:            69                                     

AR:           130                                    

QRS:          65                                     

T:            63                                     

                                                     

INTERPRETIVE STATEMENTS:                                       

                                                     

Normal sinus rhythm

Normal ECG

No previous ECG available for comparison



Electronically Signed On 05-17-22 09:32:13 CDT by Stu Sales

## 2022-07-20 ENCOUNTER — HOSPITAL ENCOUNTER (EMERGENCY)
Dept: HOSPITAL 97 - ER | Age: 37
Discharge: HOME | End: 2022-07-20
Payer: SELF-PAY

## 2022-07-20 VITALS — OXYGEN SATURATION: 100 % | SYSTOLIC BLOOD PRESSURE: 159 MMHG | TEMPERATURE: 98.7 F | DIASTOLIC BLOOD PRESSURE: 98 MMHG

## 2022-07-20 DIAGNOSIS — Z91.048: ICD-10-CM

## 2022-07-20 DIAGNOSIS — K08.89: Primary | ICD-10-CM

## 2022-07-20 DIAGNOSIS — Z72.0: ICD-10-CM

## 2022-07-20 NOTE — EDPHYS
Physician Documentation                                                                           

 [facility]                                                                 

Name: Sohail Cortezizer                                                                            

Age: 37 yrs                                                                                       

Sex: Male                                                                                         

: 1985                                                                                   

MRN: [id number]                                                                                   

Arrival Date: 2022                                                                          

Time: 12:07                                                                                       

Account#: [id number]                                                                            

Bed Waiting                                                                                       

Private MD:                                                                                       

ED Physician Beni Schwartz                                                                         

HPI:                                                                                              

                                                                                             

12:28 This 37 yrs old Male presents to ER via Ambulatory with complaints of Mouth Swelling.   [facility] 

12:28 The patient presents with pain, swelling. Onset: The symptoms/episode began/occurred    jm 

      gradually, 5 day(s) ago. Duration: The symptoms are continuous, and are unchanged since     

      they started. Modifying factors: The symptoms are alleviated by nothing, the symptoms       

      are aggravated by chewing. Associated signs and symptoms: Pertinent positives:              

      swelling, Pertinent negatives: fever. Patient states pain has kept him from sleeping. .     

                                                                                                  

Historical:                                                                                       

- Allergies:                                                                                      

12:25 Adhesives;                                                                              [facility] 

- PMHx:                                                                                           

12:25 ADD/ADHD; Bipolar disorder; Kidney stones;                                              [facility] 

- PSHx:                                                                                           

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 

                                                                                                  

- Immunization history:: Adult Immunizations.                                                     

- Social history:: Smoking status: Patient reports the use of cigarette tobacco                   

  products.                                                                                       

                                                                                                  

                                                                                                  

ROS:                                                                                              

12:28 Constitutional: Negative for fever, chills, and weight loss.                            [facility] 

12:28 Cardiovascular: Negative for chest pain, palpitations, and edema, Respiratory: Negative     

      for shortness of breath, cough, wheezing, and pleuritic chest pain.                         

12:28 ENT: Positive for dental pain.                                                              

12:28 All other systems are negative.                                                             

                                                                                                  

Exam:                                                                                             

12:28 Constitutional:  This is a well developed, well nourished patient who is awake, alert,  jmm 

      and in no acute distress. Head/Face:  atraumatic.                                           

12:28 Cardiovascular:  Regular rate and rhythm.  No edema appreciated Respiratory:  Normal        

      respirations, no respiratory distress appreciated Abdomen/GI:  Non distended Back:          

      Normal ROM Skin:  General appearance color normal MS/ Extremity:  Moves all                 

      extremities, no obvious deformities appreciated, no edema noted to the lower                

      extremities  Neuro:  Awake and alert Psych:  Behavior is normal, Mood is normal,            

      Patient is cooperative and pleasant                                                         

12:28 Head/face: right sided jaw swelling.                                                        

12:28 ENT: widespread dental decay appreciated to the right lower molars, ttp.                    

12:28 Neck: no submandibular swelling appreciated.                                                

                                                                                                  

Vital Signs:                                                                                      

12:21  / 98; Pulse 95; Resp 18; Temp 98.7(O); Pulse Ox 100% ; Weight 108.41 kg; Height  6 

      6 ft. 2 in. (187.96 cm); Pain 10/10;                                                        

12:21 Body Mass Index 30.69 (108.41 kg, 187.96 cm)                                            6 

                                                                                                  

Procedures:                                                                                       

15:12 Performed inferior alveolar block. 3 ml of 0.5% bupivacaine injected. good anesthesia   [facility] 

      was achieved.                                                                               

                                                                                                  

MDM:                                                                                              

12:31 Patient medically screened.                                                             [facility] 

12:40 Data reviewed: vital signs, nurses notes. Counseling: I had a detailed discussion with  swapna 

      the patient and/or guardian regarding: the historical points, exam findings, and any        

      diagnostic results supporting the discharge/admit diagnosis, the need for outpatient        

      follow up, to return to the emergency department if symptoms worsen or persist or if        

      there are any questions or concerns that arise at home.                                     

15:13 ED course: Patient is alert and non toxic in appearance in the ED. I do not currently   [facility] 

      suspect ludwigs. Patient advised to follow up with dentist or oral surgery and              

      otherwise given strict return precautions. patient understood and agrees with the plan      

      of care. .                                                                                  

                                                                                                  

Administered Medications:                                                                         

12:30 Drug: Marcaine (bupivacaine) (0.5 %) 10 ml Volume: 10 ml; Route: Infiltration;          [facility] 

13:46 Follow up: Response: Pain is decreased                                                  [facility] 

                                                                                                  

                                                                                                  

Disposition:                                                                                      

                                                                                             

09:27 Co-signature as Attending Physician, Beni Schwartz DO I was immediately available on-site ms3 

      in the Emergency Department for consultation in the care of the patient..                   

                                                                                                  

Disposition Summary:                                                                              

22 12:41                                                                                    

Discharge Ordered                                                                                 

      Location: Home                                                                          [facility] 

      Condition: Stable                                                                       [facility] 

      Diagnosis                                                                                   

        - Dental Pain                                                                         [facility] 

      Followup:                                                                               [facility] 

        - With: Bill Restrepo DDS                                                               

        - When: 2 - 3 days                                                                         

        - Reason: Recheck today's complaints, Continuance of care, Re-evaluation by your           

      physician                                                                                   

      Discharge Instructions:                                                                     

        - Discharge Summary Sheet                                                             [facility] 

        - Dental Pain                                                                         [facility] 

      Forms:                                                                                      

        - Medication Reconciliation Form                                                      [facility] 

        - Thank You Letter                                                                    [facility] 

        - Antibiotic Education                                                                [facility] 

        - Prescription Opioid Use                                                             [facility] 

        - Work release form                                                                   tp1 

      Prescriptions:                                                                              

        - penicillin V potassium 500 mg Oral tablet                                                

            - take 1 tablet by ORAL route every 6 hours for 10 days; 40 tablet; Refills: 0,   [facility] 

      Product Selection Permitted                                                                 

        - Ultracet 37.5-325 mg Oral Tablet                                                         

            - take 1 tablet by ORAL route every 6 hours - for up to 5 days; do not exceed 8   jmm 

      tablets per day.; 12 tablet; Refills: 0, Product Selection Permitted                        

Signatures:                                                                                       

Arun Salomon PA PA jmm Sims, Marcus, DO                        DO   ms3                                                  

Marybeth Narayan RN                   RN   jh6                                                  

                                                                                                  

**************************************************************************************************

## 2022-07-20 NOTE — ER
Nurse's Notes                                                                                     

 [facility] [place] [place]                                                                 

Name: Sohail Cortezizer                                                                            

Age: 37 yrs                                                                                       

Sex: Male                                                                                         

: 1985                                                                                   

MRN: [id number]                                                                                   

Arrival Date: 2022                                                                          

Time: 12:07                                                                                       

Account#: [id number]                                                                            

Bed Waiting                                                                                       

Private MD:                                                                                       

Diagnosis: Dental Pain                                                                            

                                                                                                  

Presentation:                                                                                     

                                                                                             

12:21 Chief complaint: Patient states: rt lower tooth pain that started 5 days ago and has    jh6 

      increased in pain over the last two. no otc meds are helping pain and noted swelling to     

      lip and jaw this am. Coronavirus screen: Vaccine status: Patient reports being              

      unvaccinated. Ebola Screen: Patient negative for fever greater than or equal to 101.5       

      degrees Fahrenheit, and additional compatible Ebola Virus Disease symptoms Patient          

      denies exposure to infectious person. Patient denies travel to an Ebola-affected area       

      in the 21 days before illness onset. Initial Sepsis Screen: Does the patient meet any 2     

      criteria? No. Patient's initial sepsis screen is negative. Does the patient have a          

      suspected source of infection? No. Patient's initial sepsis screen is negative. Risk        

      Assessment: Do you want to hurt yourself or someone else? Patient reports no desire to      

      harm self or others. Onset of symptoms was July 15, 2022.                                   

12:21 Method Of Arrival: Ambulatory                                                           [facility] 

12:21 Acuity: ANUSHA 4                                                                           jh6 

                                                                                                  

Triage Assessment:                                                                                

12:24 General: Appears in no apparent distress. General: Appears uncomfortable, Behavior is   jh6 

      cooperative. Pain: Complains of pain in lower right first bicuspid and lower right          

      second bicuspid Pain currently is 10 out of 10 on a pain scale. Quality of pain is          

      described as throbbing, Pain began 2-3 days ago. Is continuous, Aggravated by eating,       

      drinking.                                                                                   

                                                                                                  

Historical:                                                                                       

- Allergies:                                                                                      

12:25 Adhesives;                                                                              jh6 

- PMHx:                                                                                           

12:25 ADD/ADHD; Bipolar disorder; Kidney stones;                                              jh6 

- PSHx:                                                                                           

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 

                                                                                                  

- Immunization history:: Adult Immunizations.                                                     

- Social history:: Smoking status: Patient reports the use of cigarette tobacco                   

  products.                                                                                       

                                                                                                  

                                                                                                  

Assessment:                                                                                       

13:45 Reassessment: Patient is alert, oriented x 3, equal unlabored respirations, skin        jh6 

      warm/dry/pink. Patient denies pain at this time. Patient states feeling better.             

13:46 General: pt was d/c from [facility] and was not assigned to rn/ no reaction to injection to  [facility] 

      mouth, pain was decreased.                                                                  

                                                                                                  

Vital Signs:                                                                                      

12:21  / 98; Pulse 95; Resp 18; Temp 98.7(O); Pulse Ox 100% ; Weight 108.41 kg; Height  [facility] 

      6 ft. 2 in. (187.96 cm); Pain 10/10;                                                        

12:21 Body Mass Index 30.69 (108.41 kg, 187.96 cm)                                            [facility] 

                                                                                                  

ED Course:                                                                                        

12:07 Patient arrived in ED.                                                                  mr  

12:08 Arun Salomon PA is PHCP.                                                              [facility] 

12:08 Beni Schwartz DO is Attending Physician.                                                [facility] 

12:24 Triage completed.                                                                       [facility] 

12:24 Arm band placed on right wrist.                                                         [facility] 

12:41 Bill Restrepo DDS is Referral Physician.                                             [facility] 

13:47 Marybeth Narayan, RN is Primary Nurse.                                                 [facility] 

                                                                                                  

Administered Medications:                                                                         

12:30 Drug: Marcaine (bupivacaine) (0.5 %) 10 ml Volume: 10 ml; Route: Infiltration;          [facility] 

13:46 Follow up: Response: Pain is decreased                                                  [facility] 

                                                                                                  

                                                                                                  

Outcome:                                                                                          

12:41 Discharge ordered by MD.                                                                [facility] 

13:45 Discharged to home ambulatory.                                                          [facility] 

13:45 Condition: improved                                                                         

13:45 Discharge instructions given to patient, Instructed on discharge instructions,              

      Demonstrated understanding of instructions, follow-up care, medications, Prescriptions      

      given X 2.                                                                                  

13:47 Patient left the ED.                                                                    [facility] 

                                                                                                  

Signatures:                                                                                       

Arun Salomon PA PA jm                                                  

Bre Horner                                 mr                                                   

Marybeth Narayan, RN                   RN   [facility]                                                  

                                                                                                  

**************************************************************************************************

## 2022-07-21 NOTE — XMS REPORT
Continuity of Care Document

                            Created on:2022



Patient:KILO MELISSA

Sex:Male

:1985

External Reference #:[id number]





Demographics







                          Address                   [address]



                                                    [place] [address]

 

                          Home Phone                [phone]

 

                          Work Phone                [phone]

 

                          Preferred Language        English

 

                          Marital Status            Unknown

 

                          [employer] Affiliation     Unknown

 

                          Race                      Unknown

 

                          Additional Race(s)        Unavailable

 

                          Ethnic Group              Unknown









Author







                          Organization              [facility]

t

 

                          Address                   [address]



                                                    [place] [address]

 

                          Phone                     [phone]









Support







                Name            Relationship    Address         Phone

 

                SIRISHA          Unavailable     N/A             [phone]



                                                [place] [address] 

 

                SIRISHA          Unavailable     N/A             [phone]



                                                [place] [address] 

 

                OTHER           Unavailable     N/A             [phone]



                                                [place] [address] 

 

                BHATT-ROMARIO  Unavailable     [address] [facility]   [phone]



                                                [place] [address] 

 

                SIRISHA          Unavailable     [facility]             [phone]



                                                [place] [address] 









Care Team Providers







                    Name                Role                Phone

 

                    DAVID Das          Attending Clinician Unavailable

 

                    GORGE Wilson            Attending Clinician Unavailable

 

                    Physician,  Primary or Family Admitting Clinician Unavailabl

e









Payers







           Payer Name Policy Type Policy Number Effective Date Expiration Date S

ource







Problems

This patient has no known problems.



Allergies, Adverse Reactions, Alerts







       Allergy Allergy Status Severity Reaction(s) Onset  Inactive Treating Comm

ents 

Source



       Name   Type                        Date   Date   Clinician        

 

       adhesive DA     Active U      SWELLING -0                      [facility]



                                          4-17                        [facility]



                                          00:00:                      e



                                          00                          Medical



                                                                      Center

 

       adhesive DA     Active U             -0                      [facility]



                                          4-17                        [facility]



                                          00:00:                      e



                                          00                          Medical



                                                                      Center

 

       adhesive DA     Active U             2020-0                      [facility]



                                          3-10                        [facility]



                                          00:00:                      e



                                          00                          Medical



                                                                      Center

 

       adhesive DA     Active U      SWELLING 2020-0                      [facility]



                                          3-10                        [facility]



                                          00:00:                      e



                                          00                          Medical



                                                                      Center

 

       adhesive DA     Active U             2019-0                      [facility]



                                          8-22                        [facility]



                                          00:[facility]:                      [facility]



                                          00                          [facility]

 

       adhesive DA     Active U      SWELLING 2019-0                      [facility]



                                          8-22                        [facility]



                                          00:00:                      [facility]



                                          00                          [facility]

 

       adhesive DA     Active U             2016-1                      [facility]



                                          2-09                        [facility]



                                          00:00:                      [place]



                                          [address] [place] [address]







Medications

This patient has no known medications.



Procedures

This patient has no known procedures.



Encounters







        Start   End     Encounter Admission Attending Care    Care    Encounter 

Source



        Date/Time Date/Time Type    Type    Clinicians Facility Department ID   

   

 

        2021-05-15         Inpatient                 [facility]   [facility]    [id number] 

[facility]



        07:34:00                                                 [id number]  [facility]

 

        2021         Inpatient                 [facility]   IR[facility]    [id number] 

[facility]



        11:22:00                                                 [id number]  [facility]

 

        2021         Inpatient                 [facility]   [facility]    [id number] 

[facility]



        11:36:00                                                 [id number]  [facility]

 

        2021         Inpatient                 [facility]   [facility]    [id number] 

[facility]



        14:09:00                                                 [id number]  [facility]

 

        2021         Inpatient                 [facility]   [facility]    [id number] 

[facility]



        16:15:00                                                 [id number]  [facility]

 

        2020         Inpatient                 [facility]   [facility]    [id number] 

[facility]



        15:05:00                                                   [facility]

 

        2020         Inpatient                 [facility]   IRINA    [id number] 

[facility]



        09:31:00                                                 2008  [facility]

 

        2020-03-10         Inpatient                 [facility]   [facility]    [id number] 

[facility]



        12:54:00                                                   [facility]

 

        2020         Inpatient                 [facility]   IR[facility]    [id number] 

[facility]



        19:29:00                                                   [facility]

 

        2021 Emergency EM      Pippa [facility]   [facility]    [id number]

[id number] [facility]



        14:21:00 16:20:00                 Tommie                 [id number]  [facility]

 

        2021 Emergency EM      Katie   [facility]   [facility]    [id number]

[id number] [facility]



        18:50:00 20:01:00                 Gissel                  [id number]  [facility]







Results







           Test Description Test Time  Test Comments Results    Result     [facility]

e



                                                       Comments   

 

           - XR ANKLE 3 + V 2021            *********************         

   



           LT         15:48:00              *********************            



                                            ******************[facility]             



                                            ([facility])Name:            



                                            THOM MELISSA            



                                                : 1985            



                                                 Sex:             



                                            M********************            



                                            *********************            



                                            *******************            



                                            Name:                 



                                            WORKIZERTHOM            



                                            Imaging [facility]    :            



                                            1985 Age/S:36            



                                            /M            [address]            



                                            [facility]            



                                                 Unit#:[id number]            



                                                Loc: [place] [address]            



                                                          Phys:            



                                            Amauri Avalos NP            



                                                                  



                                                                  



                                             Acct#: [id number]            



                                            Dis Date:             



                                                  PHONE #:            



                                            [phone]            



                                            Status: PRE ER            



                                                                  



                                                 FAX #:            



                                            [phone]            



                                            Exam Date: 2021            



                                                      Reason:            



                                            LEFT ANKLE PAIN            



                                                                  



                                                    EXAMS:            



                                                                  



                                                             CPT            



                                            CODE:      [id number]            



                                            XR ANKLE 3 + V LT            



                                                                  



                                            [id number]                 



                                               HISTORY: Left            



                                            ankle pain.            



                                                COMPARISON: X-ray            



                                            from 2019.            



                                                                  



                                            Location: [facility].            



                                                   No acute            



                                            fracture or            



                                            dislocation. Old            



                                            fracture deformity of            



                                            the medial            



                                            malleolus with            



                                            nonunion. Marginal            



                                            osteophytes from the            



                                            talus along its            



                                            lateral inferior            



                                            margin as well is            



                                            unchanged. Prominent            



                                            posterior       talar            



                                            process. Achilles and            



                                            plantar calcaneal            



                                            enthesophytes. No            



                                              osteochondral            



                                            defects of the talus.            



                                            Tibial plafond is            



                                            unremarkable. No            



                                             soft tissue            



                                            swelling. No joint            



                                            fluid.                



                                             IMPRESSION:            



                                                     No acute            



                                            fracture or            



                                            dislocation. DJD.            



                                                 **               



                                            Electronically Signed            



                                            by JED Cleary            



                                            on 2021 at 1548            



                                            **                    



                                              Reported and signed            



                                            by: Rc Cleary M.D.                  



                                                  CC:             



                                            Tommie Das MD; Amauri Avalos NP            



                                                                  



                                                                  



                                                                  



                                                                  



                                                Technologist:            



                                            WILFREDO ENRIQUE(R),RDMS,CT            



                                                                  



                                            Trnscrpt Data:            



                                            2021 (1548)            



                                            [email or web address]             



                                                         Orig            



                                            Print D/T: S:            



                                            2021 (9665)            



                                                                  



                                            PAGE  1               



                                                    Signed Report            



                                                                  



                                                                  

 

           - XR FOOT 3 + V RT 2021            *********************       

     



                      19:50:00              *********************            



                                            ******************[facility]            



                                            [facility]            



                                            [facility]             



                                            ([facility])Name:            



                                            THOM MELISSA            



                                                : 1985            



                                                 Sex:             



                                            M********************            



                                            *********************            



                                            *******************            



                                            Name:                 



                                            THOM MELISSAwood            



                                            Imaging [facility]    :            



                                            1985 Age/S:36            



                                            /M            [address]            



                                            [facility]            



                                                 Unit#:[id number]            



                                                Loc: [place] [address]            



                                                          Phys:            



                                            Lady Limon NP            



                                                                  



                                                                  



                                             Acct#: [id number]            



                                            Dis Date:             



                                                  PHONE #:            



                                            [phone]            



                                            Status: REG ER            



                                                                  



                                                 FAX #:            



                                            [phone]            



                                            Exam Date: 2021            



                                                      Reason:            



                                            pain                  



                                                                  



                                                    EXAMS:            



                                                                  



                                                             CPT            



                                            CODE:      [id number]            



                                            XR FOOT 3 + V RT            



                                                                  



                                            [id number]                 



                                                                  



                                                 REASON FOR EXAM:            



                                            pain adn swelling            



                                                       EXAM ORDER            



                                            DATE: 2021 7:09            



                                            PM                    



                                            Ordering: Lady Limon NP            



                                            Location:[facility]            



                                                 PROCEDURE:  - XR            



                                            ANKLE 3 + V RT,  - XR            



                                            FOOT 3 + V RT            



                                                  FINDINGS: 3            



                                            views of the right            



                                            ankle and 3 views of            



                                            the right foot            



                                            were obtained. The            



                                            osseous structures            



                                            are unremarkable in            



                                            size and       shape.            



                                            The joint spaces are            



                                            maintained. No            



                                            evidence of fracture.            



                                             The                  



                                            syndesmosis is            



                                            intact.               



                                                IMPRESSION: No            



                                            acute abnormalities.            



                                                    **            



                                            Electronically Signed            



                                            by Gino De La Rosa M.D.            



                                            on 2021 at             



                                            **                    



                                              Reported and signed            



                                            by: Gino De La Rosa M.D.            



                                                                  



                                            CC: Lady Limon NP                    



                                                                  



                                                                  



                                                                  



                                                                  



                                                    Technologist:            



                                            WILFREDO ENRIQUE            



                                            RT(R),RDMS,CT            



                                                                  



                                            Trnscrpt Data:            



                                            2021 ()            



                                            [email or web address]            



                                                         Orig            



                                            Print D/T: S:            



                                            2021 ()            



                                                                  



                                            PAGE  1               



                                                    Signed Report            



                                                                  



                                                                  

 

           - XR ANKLE 3 + V 2021            *********************         

   



           RT         19:50:00              *********************            



                                            ******************[facility]             



                                            ([facility])Name:            



                                            THOM MELISSA            



                                                : 1985            



                                                 Sex:             



                                            M********************            



                                            *********************            



                                            *******************            



                                            Name:                 



                                            HTOM MELISSAwood            



                                            Imaging [facility]    :            



                                            1985 Age/S:36            



                                            /M            [address]            



                                            [facility]            



                                                 Unit#:[id number]            



                                                Loc: [place] [address]            



                                                          Phys:            



                                            Lady Limon NP            



                                                                  



                                                                  



                                             Acct#: [id number]            



                                            Dis Date:             



                                                  PHONE #:            



                                            [phone]            



                                            Status: REG ER            



                                                                  



                                                 FAX #:            



                                            [phone]            



                                            Exam Date: 2021            



                                                      Reason:            



                                            pain adn swelling            



                                                                  



                                                    EXAMS:            



                                                                  



                                                             CPT            



                                            CODE:      [id number]            



                                            XR ANKLE 3 + V RT            



                                                                  



                                            [id number]                 



                                                                  



                                                 REASON FOR EXAM:            



                                            pain adn swelling            



                                                       EXAM ORDER            



                                            DATE: 2021 7:09            



                                            PM                    



                                            Ordering: Lady Limon NP            



                                            Location:[facility]            



                                                 PROCEDURE:  - XR            



                                            ANKLE 3 + V RT,  - XR            



                                            FOOT 3 + V RT            



                                                  FINDINGS: 3            



                                            views of the right            



                                            ankle and 3 views of            



                                            the right foot            



                                            were obtained. The            



                                            osseous structures            



                                            are unremarkable in            



                                            size and       shape.            



                                            The joint spaces are            



                                            maintained. No            



                                            evidence of fracture.            



                                             The                  



                                            syndesmosis is            



                                            intact.               



                                                IMPRESSION: No            



                                            acute abnormalities.            



                                                    **            



                                            Electronically Signed            



                                            by Gino De La Rosa M.D.            



                                            on 2021 at             



                                            **                    



                                              Reported and signed            



                                            by: Gino De La Rosa M.D.            



                                                                  



                                            CC: Lady Limon NP                    



                                                                  



                                                                  



                                                                  



                                                                  



                                                    Technologist:            



                                            WILFREDO ENRIQEU            



                                            RT(R),RDMS,CT            



                                                                  



                                            Trnscrpt Data:            



                                            2021 ()            



                                            [id number]            



                                                         Orig            



                                            Print D/T: S:            



                                            2021 ()            



                                                                  



                                            PAGE  1               



                                                    Signed Report            



                                                                  



                                                                  









                    STREPTOCOCCUS PCR SCREEN 2021 04:00:00 









                      Test Item  Value      Reference Range Interpretation Comme

nts









             STREPTOCOCCUS DYSGALACTIAE (test code = STREPGC) NEGATIVE FOR G/C N

EGATIVE                  

 

             STREPA MOLECULAR (test code = STREPAMOL) NEGATIVE FOR GRP A NEGATIV

E                  



[id number] 09:03:00





             Test Item    Value        Reference Range Interpretation Comments

 

             D-DIMER (test code = DDIMER) < 100 ng/ml  < 600                    

 



COMPREHENSIVE METABOLIC PANEL2021-05-15 09:01:00





             Test Item    Value        Reference Range Interpretation Comments

 

             SODIUM (test code = 135 mmol/L   136-145      L            



             NA)                                                 

 

             POTASSIUM (test code = 4.1 mmol/L   3.5-5.1      N            



             K)                                                  

 

             CHLORIDE (test code = 100 mmol/L   101-109      L            



             CL)                                                 

 

             CARBON DIOXIDE (test 23.7 mmol/L  21-32        N            



             code = CO2)                                         

 

             ANION GAP (test code = 15 mmol/L    10-20        N            



             GAP)                                                

 

             GLUCOSE (test code = 217 mg/dL           H            



             GLU)                                                

 

             BLOOD UREA NITROGEN 5 mg/dL      3-21         N            



             (test code = BUN)                                        

 

             CREATININE (test code 1.00 mg/dL   0.55-1.3     N            



             = CREAT)                                            

 

             BUN/CREATININE RATIO 5.0          10-20        L            



             (test code = BUN/CREA)                                        

 

             TOTAL PROTEIN (test 7.5 g/dL     6.5-8.4      N            



             code = PROT)                                        

 

             ALBUMIN (test code = 4.0 g/dL     3.4-4.8      N            



             ALB)                                                

 

             GLOBULIN (test code = 3.5 G/DL     1-10         N            



             GLOB)                                               

 

             ALBUMIN/GLOBULIN RATIO 1.14 RATIO   0.75-1.50    N            



             (test code = A/G)                                        

 

             CALCIUM (test code = 8.8 mg/dL    8.4-10.2     N            



             CA)                                                 

 

             BILIRUBIN TOTAL (test 0.40 mg/dL   0.0-1.0      N            



             code = BILT)                                        

 

             SGOT/AST (test code = 22 U/L       6-32         N            



             AST)                                                

 

             SGPT/ALT (test code = 37 U/L       12-78        N            Note: 

Change in



             ALT)                                                REFERENCE RANGE

 due



                                                                 to new reagent



                                                                 method.

 

             ALKALINE PHOSPHATASE 83 U/L              N            



             TOTAL (test code =                                        



             ALKP)                                               



[id number] 09:01:00





             Test Item    Value        Reference Range Interpretation Comments

 

             TROPONIN-I (test code = TROPI) <0.015 ng/mL 0.00-0.056   N         

   



COVID 19 INHOUSE AG2021-05-15 08:59:00





             Test Item    Value        Reference Range Interpretation Comments

 

             COVID 19 INHOUSE AG (test code = NEGATIVE     NEGATIVE             

     



             [id number])                                        



COMPREHENSIVE METABOLIC PANEL2021-05-15 08:52:00





             Test Item    Value        Reference Range Interpretation Comments

 

             SODIUM (test code = NA) 135 mmol/L   136-145      L            

 

             POTASSIUM (test code = K) 4.1 mmol/L   3.5-5.1      N            

 

             CHLORIDE (test code = CL) 100 mmol/L   101-109      L            

 

             CARBON DIOXIDE (test code = CO2) 23.7 mmol/L  21-32        N       

     

 

             ANION GAP (test code = GAP) 15 mmol/L    10-20        N            

 

             GLUCOSE (test code = GLU) 217 mg/dL           H            

 

             BLOOD UREA NITROGEN (test code = 5 mg/dL      3-21         N       

     



             BUN)                                                

 

             CREATININE (test code = CREAT) 1.00 mg/dL   0.55-1.3     N         

   

 

             BUN/CREATININE RATIO (test code = 5.0          10-20        L      

      



             BUN/CREA)                                           

 

             TOTAL PROTEIN (test code = PROT)  gram/dL     6.4-8.2              

     

 

             ALBUMIN (test code = ALB)  g/dL        3.4-5.0                   

 

             GLOBULIN (test code = GLOB)  g/dL        2.7-4.2                   

 

             ALBUMIN/GLOBULIN RATIO (test code              0.75-1.50           

      



             = A/G)                                              

 

             CALCIUM (test code = CA) 8.8 mg/dL    8.4-10.2     N            

 

             BILIRUBIN TOTAL (test code =  mg/dL       0.2-1.2                  

 



             BILT)                                               

 

             SGOT/AST (test code = AST)  IUnit/L     15-37                     

 

             SGPT/ALT (test code = ALT)  U/L         10-69                     

 

             ALKALINE PHOSPHATASE TOTAL (test  IUnit/L                    

     



             code = ALKP)                                        



[id number] 08:52:00





             Test Item    Value        Reference Range Interpretation Comments

 

             TROPONIN-I (test code = TROPI)  ng/mL       0-0.045                

   



CBC W/AUTO DIFF2021-05-15 08:41:00





             Test Item    Value        Reference Range Interpretation Comments

 

             WHITE BLOOD CELL (test code = 7.3 K/mm3    4.5-12.5     N          

  



             WBC)                                                

 

             RED BLOOD CELL (test code = 5.05 mill/mm3 4.0-5.8      N           

 



             RBC)                                                

 

             HEMOGLOBIN (test code = HGB) 15.3 gram/dL 13.0-17.5    N           

 

 

             HEMATOCRIT (test code = HCT) 45.2 %       42.0-52.0    N           

 

 

             MEAN CELL VOLUME (test code = 89.5 fL      80-98        N          

  



             MCV)                                                

 

             MEAN CELL HGB (test code = MCH) 30.3 picogram 27.0-33.0    N       

     

 

             MEAN CELL HGB CONCETRATION 33.8 gram/dL 33.0-36.0    N            



             (test code = MCHC)                                        

 

             RED CELL DISTRIBUTION WIDTH 11.6 %       11.6-16.2    N            



             (test code = RDW)                                        

 

             RED CELL DISTRIBUTION WIDTH SD 38.8 fL      37.0-51.0    N         

   



             (test code = RDW-SD)                                        

 

             PLATELET COUNT (test code = 192 K/mm3    150-450      N            



             PLT)                                                

 

             MEAN PLATELET VOLUME (test code 10.2 fL      6.7-11.0     N        

    



             = MPV)                                              

 

             NEUTROPHIL % (test code = NT%) 53.6 %       39.0-69.0    N         

   

 

             LYMPHOCYTE % (test code = LY%) 36.0 %       25.0-55.0    N         

   

 

             MONOCYTE % (test code = MO%) 5.5 %        0.0-10.0     N           

 

 

             EOSINOPHIL % (test code = EO%) 3.4 %        0.0-5.0      N         

   

 

             BASOPHIL % (test code = BA%) 1.0 %        0.0-1.0      N           

 

 

             NEUTROPHIL # (test code = NT#) 3.93 K/mm3   1.8-7.7      N         

   

 

             LYMPHOCYTE # (test code = LY#) 2.64 K/mm3   1.0-5.0      N         

   

 

             MONOCYTE # (test code = MO#) 0.40 K/mm3   0-0.8        N           

 

 

             EOSINOPHIL # (test code = EO#) 0.25 K/mm3   0.0-0.5      N         

   

 

             BASOPHIL # (test code = BA#) 0.07 K/mm3   0.0-0.2      N           

 

 

             MANUAL DIFF REQUIRED (test code NO                                 

    



             = MDIFF)                                            



- XR CHEST 2 -05-15 08:13:00
************************************************************[facility] ([facility])Name: WORKIZERTHOM         : 1985  
     Sex: M************************************************************  Name: 
THOM MELISSA [facility]    : 
1985 Age/S:35  /M            [address] [facility]           
Unit#:[id number]     Loc: [place] [address]               Phys:
Tommie Das MD                Acct#: [id number] Dis Date:             
     PHONE #: [phone]      Status: PRE ER                                  
FAX #: [phone]      Exam Date: 05/15/2021           Reason: SOB            
                                    EXAMS:       CPT CODE:      [id number] XR 
CHEST 2 V                               12440       REASON FOR EXAM: SOB        
      Exam Order Date: 5/15/2021 7:50 AM               Ordering:Tommie Das MD       Attending:Tommie Das MD       Location:[facility]            
  PROCEDURE:  - XR CHEST 2 V               COMPARISON: 2021               
FINDINGS:  PA and lateral views of the chest show patchy airspace       opacity 
of the bases. No evidence of effusion. The heart size is       within normal 
limits. Pulmonary vasculatures are unremarkable. The       osseous structures 
are grossly intact.                 IMPRESSION: Atelectasis of the bases        
 ** Electronically Signed by JED Moreno on 05/15/2021 at 0813 **              
       Reported and signed by: Girish Moreno M.D.                     CC: 
Tommie Das MD                                                          
        Technologist: Alfreda Muro                                   Trnscrpt 
Data: 05/15/2021 (813) [email or web address].VTL  Orig Print D/T: S: 05/15/2021 (0817)         
               PAGE  1                       Signed Report- XR RIBS UNI W/CXR 
3+V [id number] 13:25:00
************************************************************[facility] THOM [facility]         : 1985        Sex:
M************************************************************                   
                               [place]:   St: REG-------------------
------------------------------------------------------------  Name:   
WORKIZERTHOM  [facility]                    : 1985  Age/S: 
35/M           [address] [place] [address]       Unit #: [id number]      Loc: [place] [address]                 Phys: Norberto Fortune MD                
                                 Acct: [id number] Dis Date:               
atus: REG ER                                 PHONE #: [phone]     Exam 
Date:                        FAX #: [phone]     Reason: pain 
s/p fall     EXAMS:                                               CPT CODE:     
[id number] XR RIBS UNI W/CXR3+V LT                   39220                    
Procedure: Left Rib Series.               ClinicalIndication: Left rib pain post
fall.               Comparison: Chest radiograph 2020.    FINDINGS:        
       The AP and oblique views of the left ribs, 5 views, show no definite   
rib fractures. The costovertebral junctions are unremarkable.               
There are no associated pleural effusions or pneumothorax. There are       no 
underlying pulmonary contusions noted.          IMPRESSION:         1. 
Unremarkable exam.                                       SL: OCO-H              
                                ** Electronically Signed by JED Dalton **         **              on 2021 at 1325             **        
             Reported and signed by: Eitan Dalton M.D.                CC:  
                                                                                
Technologist: RT Jackie(R); RT Gerard(R)          Mariah 
Date/Time/By: 2021 (7043) : By: Fatou           Orig Print D/T: S: 
2021 (3123)                         PAGE  1      Signed Report- CT 
HEAD/BRAIN W/O [id number] 14:40:00
************************************************************[facility] ([facility])Name: THOM MELISSA         : 1985  
     Sex: M************************************************************  [facility]     : 
1985 Age/S: 35  / M         [address] [facility]           Unit #: 
[id number]     Loc:          [place] [address]                Phys: Elver Sandhu DO                 Acct: [id number]  Dis Date:               Status: PRE ER
       PHONE #: [phone]     Exam Date: 2021  1428                    
FAX #: [phone]      Reason: blunt trauma                                   
    EXAMS:                  CPT CODE:      [id number] CT HEAD/BRAIN W/O CONT     
               [address]          HISTORY:  blunt trauma               TECHNIQUE: 
Noncontrast 2.5 mm axial CT of the head. Examination       acquired within 24 
hours of arrival. Automated exposure control for       dose reduction.          
    COMPARISON: CT scan of the brain 2019               FINDINGS:    
No lacerations or contusions of the scalp or facial soft tissues.        
Calvarium and skull base are intact.               No acute hemorrhage. No 
intracranial mass, mass effect, or midlineshift. No effacement of the sulci or 
grey-white matter interface.                No cortical atrophy.  No signs of 
white matter small-vessel disease.               No hydrocephalus.. No extra-
axial fluid collection.                Mucosal thickening in the ethmoid 
sinuses. Remaining paranasal sinuses     appear clear.        Postsurgical 
changes of mastoidectomy on the right side. Partial       opacification of the 
left mastoid air cells appear similar to the       prior exam.       Orbital 
contents are unremarkable.                          IMPRESSION:                 
 Negative CT head.          Location:           ** Electronically Signed by 
Mariano Milton MD on 2021 at 1440 **                    Reported and signed 
by: Mariano Milton MD      CC: Elver Sandhu DO  Technologist:Alfreda Muro          
          CTDI:        DLP:        Trnscb Date/Time: 2021(0030) SteffiRR31
                      Orig Print D/T: S: 2021 (2033)      PAGE  1         
 Signed [id number] 13:33:00





             Test Item    Value        Reference Range Interpretation Comments

 

             GLUBED (test code = 313 mg/dL           H            Performe

d by certified



             GLUBED)                                              at [facility]



- XR CHEST 1 -91-53 11:32:00 FAX:         Calli Pacheco NP               
   [place]:    St: REG-------------------------------
------------------------------------------------  Name:   CLARAIZERTHOM      
          [facility]                     : 1985  Age/S: 35/M       
   [facility]                Unit[facility] [id number]      Loc: [place]  [address]              Phys: Calli Pacheco NP                      
                     Acct: [id number] Dis Date:               Status: REG ER  
                              PHONE #: [phone]     Exam Date:     
2020     1115           FAX #: [phone]     Reason: SHORTNESS OF 
BREATH                                EXAMS:                                    
         CPT CODE:      [id number] XR CHEST 1 V             [address]74                
   HISTORY: Shortness of breath.               COMPARISON: Chest x-ray from 
2020.               Location: [facility].               No acute 
infiltrates, effusion or congestion is noted. Suboptimal       inspiration with 
dependent changes. Mild cardiomegaly.             IMPRESSION:                   
No acute infiltrates, effusion or congestion.          ** Electronically Signed 
by JED Cleary on 2020 at 1132 **                      Reported and 
signed by: Rc Cleary M.D.                            CC: Calli Pacheco NP 
                                                                                
               Technologist: DENISA WORKMAN, RT(R)                        
   Trnscrd Date/Time/By: 2020 (6296) : By: [email or web address]           Orig Print 
D/T: S: 2020 (1139)                         PAGE  1                     
Signed Report- CT ABD PELVIS [id number] 11:23:00  Name: THOM MELISSA [facility]                     : 1985 Age/S: 35  / M
        [address]                Unit #: [id number]     Loc:             
 [place]  [address]              Phys: Calli Pacheco  NP                     
                            Acct: [id number]  Dis Date:               Status: 
REG ER                                  PHONE #: [phone]     Exam Date: 
2020  1111                     FAX #: [phone]      Reason: abdominal 
pain                                      EXAMS:                                
              CPTCODE:      [id number] CT ABD PELVIS W/CONT                      
[address]                    HISTORY: Abdominal pain.               COMPARISON: 
2019 and 2019.               Location: [facility].               CT
abdomen and pelvis with IV contrast: 100 mL of Isovue-370.       Automated 
exposure control.               CT of abdomen:               The lung bases are 
clear.               Hepatic parenchyma enhances homogeneously. No parenchymal 
mass or       nodules. Mild fatty infiltration. Portal vein and hepatic artery 
are       patent. Gallbladder is without radiopaque stones. The liver is       
measuring 25.4 cm in length.               The spleen is unremarkable. The 
stomach distended incompletely with       food however it is normal in 
appearance.               Pancreas is enhancing homogeneously. Unremarkable 
adrenals.               Kidneys are free from hydroureteronephrosis. Homogeneous
enhancement.       Bilateral excretion.               No pathologic adenopathy. 
Well-opacified abdominal and pelvic       vasculature.               No bowel 
obstruction. No diverticulitis. Circumferential wall       thickening of the 
left colon and to lesser extent on the right side       suggestive of colitis. 
No enteritis with normal small bowel loops.               CT PELVIS:Appendix is 
normal. Pelvic bowel loops are unobstructed. Thickened       right and left 
colonic walland the rectal wall. These findings       suggesting acute colitis. 
No free fluid or free air or abscess. Small       bowel loops are unremarkable. 
             Unremarkable incompletely distended urinary bladder. The prostate 
is       not enlarged. No pelvic pathologic adenopathy.               Subcu
taneous tissues and musculature are unremarkable. No lytic or       blastic 
lesions are noted withinthe bony skeleton.     PAGE  1                       
Signed Report                    (CONTINUED)   Name: THOM MELISSA [facility]                     : 1985 Age/S: 35/ M         
[address]                Unit #: [id number]     Loc:               
[place]  [id number]              Phys: Junior,Calli  NP                        
                         Acct: [id number]  Dis Date:               Status: REG
ER                                  PHONE #: [phone]     Exam Date: 
2020  1111                     FAX #: [phone]      Reason: abdominal 
pain                                      EXAMS:                                
              CPT CODE:      [id number] CT ABD PELVIS W/CONT                     
 [address]               &lt;Continued&gt;               IMPRESSION:                
  Mild circumferential wall thickening of the left colon and to lesser         
extent the right colon and rectum suggestive of segmental colitis.         Mild
inflammation. No free fluid, free air or abscess. Normal         appendix.      
            Fibrofatty infiltrated liver with hepatomegaly. No pathologic       
 adenopathy. No hydroureteronephrosis with unremarkable incompletely         
distended urinary bladder.          ** Electronically Signed byJED Cleary
on 2020 at 1123 **                      Reported and signed by: Rc Cleary M.D.                           CC: Calli Pacheco NP                   
                                                   Technologist:Ar Washington 
RT(R),(MR),(CT)   CTDI:        DLP:        Trnscb Date/Time: 2020 (1123) 
t.SDR.[email or web address]        Orig Print D/T: S: 2020 (1126)      PAGE  2              
        Signed ReportB-TYPE NATRIURETIC [id number] 11:04:00





             Test Item    Value        Reference Range Interpretation Comments

 

             B-TYPE NATRIURETIC PEPTIDE 3.56 pgram/mL 0-100        N            



             (test code = BNP)                                        



BASIC METABOLIC [id number] 10:18:00





             Test Item    Value        Reference Range Interpretation Comments

 

             SODIUM (test code = 134 mmol/L   136-145      L            



             NA)                                                 

 

             POTASSIUM (test code 3.9 mmol/L   3.5-5.1      N            



             = K)                                                

 

             CHLORIDE (test code = 101.0 mmol/L        N            



             CL)                                                 

 

             CARBON DIOXIDE (test 22.0 mmol/L  21-32        N            



             code = CO2)                                         

 

             ANION GAP (test code 14.9         10-20        N            



             = GAP)                                              

 

             GLUCOSE (test code = 367 mg/dL           H            



             GLU)                                                

 

             BLOOD UREA NITROGEN 9 mg/dL      7-18         N            



             (test code = BUN)                                        

 

             GLOMERULAR FILTRATION > 60 mL/min  >=60                      Estima

abelardo GFR by



             RATE (test code =                                        using Madi

fied MDRD



             GFR)                                                formula.Chronic



                                                                 kidney disease 

is



                                                                 defined as eith

er



                                                                 kidney damageor

 GFR



                                                                 <60 mL/min/1.73

 m2



                                                                 for >3 months.

 

             CREATININE (test code 1.20 mg/dL   0.7-1.3      N            



             = CREAT)                                            

 

             BUN/CREATININE RATIO 7.5          10-20        L            



             (test code =                                        



             BUN/CREA)                                           

 

             CALCIUM (test code = 8.7 mg/dL    8.5-10.1     N            



             CA)                                                 



HEPATIC FUNCTION [id number] 10:18:00





             Test Item    Value        Reference Range Interpretation Comments

 

             TOTAL PROTEIN (test 7.3 gram/dL  6.4-8.2      N            



             code = PROT)                                        

 

             ALBUMIN (test code = 3.7 g/dL     3.4-5.0      N            



             ALB)                                                

 

             GLOBULIN (test code = 3.6 gram/dL  2.7-4.2      N            



             GLOB)                                               

 

             ALBUMIN/GLOBULIN RATIO 1.0          0.75-1.50    N            



             (test code = A/G)                                        

 

             BILIRUBIN TOTAL (test 0.50 mg/dL   0.0-1.0      N            



             code = BILT)                                        

 

             BILIRUBIN DIRECT (test 0.12 mg/dL   0.0-0.20     N            



             code = BILD)                                        

 

             SGOT/AST (test code = 31 IUnit/L   15-37        N            



             AST)                                                

 

             SGPT/ALT (test code = 55 IUnit/L   12-78        N            



             ALT)                                                

 

             ALKALINE PHOSPHATASE 70 IUnit/L          N            **Note 

change in



             TOTAL (test code =                                        reference

 range due



             ALKP)                                               to change in



                                                                 reagent.**



LACTIC DEHYDROGENASE(LDH)2020 10:18:00





             Test Item    Value        Reference Range Interpretation Comments

 

             LACTIC DEHYDROGENASE(LDH) (test 119 IUnit/L         N        

    



             code = LDH)                                         



[id number] 10:18:00





             Test Item    Value        Reference Range Interpretation Comments

 

             LIPASE (test code = LIP) 159 U/L      73.0-393.0   N            



[id number] 10:18:00





             Test Item    Value        Reference Range Interpretation Comments

 

             TROPONIN-I (test code = TROPI) <0.015 ng/mL 0-0.045      N         

   



[id number] 10:18:00





             Test Item    Value        Reference Range Interpretation Comments

 

             FERRITIN (test code = KASIA) 151 ng/mL    8-388        N            



COVID 19 INHOUSE [id number] 10:18:00





             Test Item    Value        Reference Range Interpretation Comments

 

             COVID 19 INHOUSE AG (test code = NEGATIVE                          

     



             [id number])                                        



CBC W/AUTO [id number] 10:17:00





             Test Item    Value        Reference Range Interpretation Comments

 

             WHITE BLOOD CELL (test code = 6.1 K/mm3    4.5-12.5     N          

  



             WBC)                                                

 

             RED BLOOD CELL (test code = 4.62 mill/mm3 4.0-5.8      N           

 



             RBC)                                                

 

             HEMOGLOBIN (test code = HGB) 14.4 gram/dL 13.0-17.5    N           

 

 

             HEMATOCRIT (test code = HCT) 41.9 %       42.0-52.0    L           

 

 

             MEAN CELL VOLUME (test code = 90.7 fL      80-98        N          

  



             MCV)                                                

 

             MEAN CELL HGB (test code = MCH) 31.2 picogram 27.0-33.0    N       

     

 

             MEAN CELL HGB CONCETRATION 34.4 gram/dL 33.0-36.0    N            



             (test code = MCHC)                                        

 

             RED CELL DISTRIBUTION WIDTH 11.9 %       11.6-16.2    N            



             (test code = RDW)                                        

 

             RED CELL DISTRIBUTION WIDTH SD 39.0 fL      37.0-51.0    N         

   



             (test code = RDW-SD)                                        

 

             PLATELET COUNT (test code = 182 K/mm3    150-450      N            



             PLT)                                                

 

             MEAN PLATELET VOLUME (test code 11.1 fL      6.7-11.0     H        

    



             = MPV)                                              

 

             NEUTROPHIL % (test code = NT%) 52.0 %       39.0-69.0    N         

   

 

             IMMATURE GRANULOCYTE % (test 0.8 %        0.0-5.0      N           

 



             code = IG%)                                         

 

             LYMPHOCYTE % (test code = LY%) 37.4 %       25.0-55.0    N         

   

 

             MONOCYTE % (test code = MO%) 6.3 %        0.0-10.0     N           

 

 

             EOSINOPHIL % (test code = EO%) 2.5 %        0.0-5.0      N         

   

 

             BASOPHIL % (test code = BA%) 1.0 %        0.0-1.0      N           

 

 

             NUCLEATED RBC % (test code = 1.0 %        0-0          H           

 



             NRBC%)                                              

 

             NEUTROPHIL # (test code = NT#) 3.15 K/mm3   1.8-7.7      N         

   

 

             IMMATURE GRANULOCYTE # (test 0.05 x10 3/uL 0-0.03       H          

  



             code = IG#)                                         

 

             LYMPHOCYTE # (test code = LY#) 2.26 K/mm3   1.0-5.0      N         

   

 

             MONOCYTE # (test code = MO#) 0.38 K/mm3   0-0.8        N           

 

 

             EOSINOPHIL # (test code = EO#) 0.15 K/mm3   0.0-0.5      N         

   

 

             BASOPHIL # (test code = BA#) 0.06 K/mm3   0.0-0.2      N           

 

 

             NUCLEATED RBC # (test code = 0.06 K/mm3   0.0-0.1      N           

 



             NRBC#)                                              

 

             MANUAL DIFF REQUIRED (test code NO                                 

    



             = MDIFF)                                            



C REACTIVE [id number] 10:14:00





             Test Item    Value        Reference Range Interpretation Comments

 

             C REACTIVE PROTEIN (test code = 0.93 mg/dL   0-0.3        H        

    



             CRP)                                                



CBC W/AUTO [id number] 10:13:00





             Test Item    Value        Reference Range Interpretation Comments

 

             WHITE BLOOD CELL (test code =  K/mm3       4.5-12.5                

  



             WBC)                                                

 

             RED BLOOD CELL (test code = RBC)  mill/mm3    4.0-5.8              

     

 

             HEMOGLOBIN (test code = HGB) 14.4 gram/dL 13.0-17.5    N           

 

 

             HEMATOCRIT (test code = HCT)  %           42.0-52.0                

 

 

             MEAN CELL VOLUME (test code =  fL          80-98                   

  



             MCV)                                                

 

             MEAN CELL HGB (test code = MCH)  picogram    27.0-33.0             

    

 

             MEAN CELL HGB CONCETRATION (test  gram/dL     33.0-36.0            

     



             code = MCHC)                                        

 

             RED CELL DISTRIBUTION WIDTH  %           11.6-16.2                 



             (test code = RDW)                                        

 

             RED CELL DISTRIBUTION WIDTH SD  fL          37.0-51.0              

   



             (test code = RDW-SD)                                        

 

             PLATELET COUNT (test code = PLT) 182 K/mm3    150-450      N       

     

 

             MEAN PLATELET VOLUME (test code  fL          6.7-11.0              

    



             = MPV)                                              

 

             NEUTROPHIL % (test code = NT%)  %           39.0-69.0              

   

 

             IMMATURE GRANULOCYTE % (test  %           0.0-5.0                  

 



             code = IG%)                                         

 

             LYMPHOCYTE % (test code = LY%)  %           25.0-55.0              

   

 

             MONOCYTE % (test code = MO%)  %           0.0-10.0                 

 

 

             EOSINOPHIL % (test code = EO%)  %           0.0-5.0                

   

 

             BASOPHIL % (test code = BA%)  %           0.0-1.0                  

 

 

             NEUTROPHIL # (test code = NT#)  K/mm3       1.8-7.7                

   

 

             LYMPHOCYTE # (test code = LY#)  K/mm3       1.0-5.0                

   

 

             MONOCYTE # (test code = MO#)  K/mm3       0-0.8                    

 

 

             EOSINOPHIL # (test code = EO#)  K/mm3       0.0-0.5                

   

 

             BASOPHIL # (test code = BA#)  K/mm3       0.0-0.2                  

 



BASIC METABOLIC [id number] 10:06:00





             Test Item    Value        Reference Range Interpretation Comments

 

             SODIUM (test code = NA) 134 mmol/L   136-145      L            

 

             POTASSIUM (test code = K) 3.9 mmol/L   3.5-5.1      N            

 

             CHLORIDE (test code = CL) 101.0 mmol/L        N            

 

             CARBON DIOXIDE (test code = CO2)  mmol/L      21-32                

     

 

             ANION GAP (test code = GAP)              10-20                     

 

             GLUCOSE (test code = GLU)  mg/dL                           

 

             BLOOD UREA NITROGEN (test code =  mg/dL       7-18                 

     



             BUN)                                                

 

             GLOMERULAR FILTRATION RATE (test  mL/min      >=60                 

     



             code = GFR)                                         

 

             CREATININE (test code = CREAT)  mg/dL       0.7-1.3                

   

 

             BUN/CREATININE RATIO (test code              1020                 

    



             = BUN/CREA)                                         

 

             CALCIUM (test code = CA)  mg/dL       8.5-10.1                  



HEPATIC FUNCTION [id number] 10:06:00





             Test Item    Value        Reference Range Interpretation Comments

 

             TOTAL PROTEIN (test code = PROT)  gram/dL     6.4-8.2              

     

 

             ALBUMIN (test code = ALB)  g/dL        3.4-5.0                   

 

             GLOBULIN (test code = GLOB)  gram/dL     2.7-4.2                   

 

             ALBUMIN/GLOBULIN RATIO (test code =              0.75-1.50         

        



             A/G)                                                

 

             BILIRUBIN TOTAL (test code = BILT)  mg/dL       0.0-1.0            

       

 

             BILIRUBIN DIRECT (test code = BILD)  mg/dL       0.0-0.20          

        

 

             SGOT/AST (test code = AST)  IUnit/L     15-37                     

 

             SGPT/ALT (test code = ALT)  IUnit/L     12-78                     

 

             ALKALINE PHOSPHATASE TOTAL (test  IUnit/L                    

     



             code = ALKP)                                        



LACTIC DEHYDROGENASE(LDH)2020 10:06:00





             Test Item    Value        Reference Range Interpretation Comments

 

             LACTIC DEHYDROGENASE(LDH) (test code  IUnit/L                

         



             = LDH)                                              



[id number] 10:06:00





             Test Item    Value        Reference Range Interpretation Comments

 

             LIPASE (test code = LIP)  U/L         73.0-393.0                



[id number] 10:06:00





             Test Item    Value        Reference Range Interpretation Comments

 

             TROPONIN-I (test code = TROPI)  ng/mL       0-0.045                

   



[id number] 10:06:00





             Test Item    Value        Reference Range Interpretation Comments

 

             FERRITIN (test code = KASIA)  ng/mL       8-388                     



- XR FOOT 3 + V RT2020-03-10 14:53:00 FAX: Jordon Doll DO   
[phone]    [place]:   St: REG-------------------------------
------------------------------------------------  Name:   THOM MELISSA      
          [facility]                    : 1985  Age/S: 34/M       
   [address] [place] [address]         Unit#: [id number]      Loc: [place] [address]                 Phys: Jordon Barrow DO                      
                     Acct: [id number] Dis Date:               Status: REG ER  
                              PHONE #: [phone]     Exam Date:     
03/10/2020     1435           FAX #: [phone]     Reason: FOOT PAIN         
                                EXAMS:                                          
   CPT CODE:      [id number] XR FOOT 3 + V RT             91072                  
 PROCEDURE: Right foot 3 views               INDICATION: Foot pain post trauma. 
10 foot fall from a ladder.               COMPARISON: There are no previous 
relevant studies available for       correlation.               FINDINGS: 3 
views of the right foot were obtained utilizing portable       equipment.  The 
films are technically suboptimal.  Limited assessment     of the toes.  There is
no fracture, dislocation or other acute       skeletal abnormality.  Smoothly 
marginated osteophyte dorsal talus,       etiology undetermined.  There are 
small osteophyte anterior corner of       the tibia.  The joint space is 
maintained.  No gross joint effusion.        Small calcaneal enthesophyte at the
insertion of plantar fascia.  The       soft tissues are unremarkable and 
limitations of technique.                 IMPRESSION:          1.  Limited right
foot radiographs demonstrating no acute abnormality.         2.  Talar 
osteophyte, etiology undetermined.  Degenerative changes of         the ankle 
are possible.  If there is continued clinical concern,         further imaging 
options would include MRI.                   SL:  OLE                   ** 
Electronically Signed by JED Escamilla **           **             on 
03/10/2020 at 1453             **                  Reported and signed by: 
Shan Escamilla M.D.                CC: Jordon Barrow DO                 
Technologist: Ramona Fajardo RT(R); Danita PorterRT(R)         Trnscrd 
Date/Time/By: 03/10/2020 (6910) : By: Yash           Orig Print D/T: S: 
03/10/2020 (3459)           PAGE  1                       Signed Report- XR 
TIBIA/FIBULA 2 V RT2020-03-10 14:50:00 FAX: Jordon Doll DO   
[phone]    [place]:   St: REG-------------------------------
------------------------------------------------  Name:   CLARAIZERTHOM      
          [facility]                    : 1985  Age/S: 34/M       
   [address] [place] [address]         Unit#: [id number]      Loc: [place] [address]                 Phys: Jordon Barrow DO                      
                     Acct: [id number] Dis Date:               Status: REG ER  
                              PHONE #: [phone]     Exam Date:     
03/10/2020     1435           FAX #: [phone]     Reason: LEG PAIN          
                                EXAMS:                                          
   CPT CODE:      [id number] XR TIBIA/FIBULA 2 V RT             16075            
       PROCEDURE: Right leg 2 views               INDICATION: Leg pain post 
trauma.  10 foot fall from ladder.               COMPARISON: There are no 
previous relevant studies available for       correlation.               
FINDINGS: No bone, joint or soft tissue abnormality demonstrated.               
 IMPRESSION: Normal radiographs.                   SL:  OLE          ** 
Electronically Signed by JED Escamilla **           **             on 
03/10/2020at 1450             **                      Reported and signed by: 
Shan Escamilla M.D.               CC: Jordon Barrow DO                       
                              Technologist: Ramona Fajardo RT(R); Danita LariosRT(R)         Trnscrd Date/Time/By: 03/10/2020 (0641) : By: Yash   
       Orig Print D/T: S: 03/10/2020 (5343)                         PAGE  1     
                 Signed Report- XR FEMUR MIN 2 VWS RT2020-03-10 14:49:00 FAX: Jordon Doll DO   [phone]    [place]:   St: 
REG-------------------------------
------------------------------------------------  Name:   WORKIZERTHOM      
          [facility]                    : 1985  Age/S: 34/M       
   [address] [place] [address]         Unit#: [id number]      Loc: [place] [address]                 Phys: Jordon Barrow DO                      
                     Acct: [id number] Dis Date:               Status: REG ER  
                              PHONE #: [phone]     Exam Date:     
03/10/2020     1435           FAX #: [phone]     Reason: THIGH PAIN        
                                EXAMS:                                          
   CPT CODE:      [id number] XR FEMUR MIN 2 VWS RT             42026             
      PROCEDURE: Right femur 2 views               INDICATION: Thighpain post 
trauma.  10 foot fall from a ladder.               COMPARISON: Current pelvic 
radiographs             FINDINGS: No bone, joint or soft tissue abnormality 
demonstrated.                 IMPRESSION: Normal radiographs.                   
SL:  OLE                   ** Electronically Signed by JED Escamilla ** 
         **             on 03/10/2020 at 1449             **     Reported and 
signed by: Shan Escamilla M.D.                            CC: Jordon Barrow DO                Technologist: Ramona Fajardo RT(R); aDnita PorterRT(R)  
      Trnscrd Date/Time/By: 03/10/2020 (5047) : By: Yash           Orig 
Print D/T: S: 03/10/2020 (0351)          PAGE  1                       Signed 
Report- XR L-SPINE 2/3 VIEWS2020-03-10 14:47:00 FAX: Jordon Doll DO 
 [phone]    [place]:   St: REG-------------------------------
------------------------------------------------  Name:   CLARAIZERTHOM                    : 1985  Age/S: 34/M       
   [address] [place] [address]         Unit#: [id number]      Loc: [place] [address]                 Phys: Jordon Barrow DO                      
                     Acct: [id number] Dis Date:               Status: REG ER  
                              PHONE #: [phone]     Exam Date:     
03/10/2020     1436           FAX #: [phone]     Reason: BACK PAIN         
                                EXAMS:                                          
   CPT CODE:      [id number] XR L-SPINE 2/3 VIEWS             10201              
     PROCEDURE: Lumbar spine AP and lateral radiographs.  3 views.             
INDICATION: BACK PAIN.               COMPARISON: CT abdomen and pelvis dated 
2019,2016.               FINDINGS: Normal alignment of the spine.  No 
evidence for acute bony       fracture or dislocation.  Mild degenerative 
changes.  Irregular       sclerotic osteophyte along the anterior inferior L1 
vertebral body       appears stable.  Surrounding soft tissues appear 
unremarkable.                 IMPRESSION: No acute abnormality identified.      
            SL: [id number]              ** Electronically Signed by JED Duran **         **                on 03/10/2020 at 1447         
     **                      Reported and signed by: Thom Duran M.D. 
                       CC: Jordon Barrow DO                                    
                                Technologist: Ramona Fajardo, RT(R); RT Froilan(R)         Trnscrd Date/Time/By: 03/10/2020 (1447) : By: [email or web address] 
Orig Print D/T: S: 03/10/2020 (0367)                         PAGE  1            
          Signed Report- XR PELVIS 1/2 VIEWS2020-03-10 14:47:00 FAX: Jordon Doll DO   [phone]    [place]:   St: 
REG-------------------------------
------------------------------------------------  Name:   THOM MELISSA                    : 1985  Age/S: 34/M       
   [address] [place] [address]         Unit#: [id number]      Loc: [place] [address]                 Phys: Jordon Barrow DO                      
                     Acct: [id number] Dis Date:               Status: REG ER  
                              PHONE #: [phone]     Exam Date:     
03/10/2020     1435           FAX #: [phone]     Reason: PELVIC PAIN       
                                EXAMS:                                          
   CPT CODE:      [id number] XR PELVIS 1/2 VIEWS             35318               
    PROCEDURE: PELVIS SINGLE VIEW               INDICATION: Pelvicpain post 
trauma.  10 foot fall from ladder.               COMPARISON: Current right femur
radiographs               FINDINGS: The pelvic ring is intact.  The proximal 
femora are intact       and located.  No soft tissue abnormality demonstrated.  
            COMMENTS: If there is continued clinical concern, further imaging   
   options include CT and MRI.                 IMPRESSION: Normal radiograph.   
               SL:  OLE                   ** Electronically Signed by JED Escamilla **           **             on 03/10/2020 at 3822             **
                     Reported and signed by: Shan Escamilla M.D.              
         CC: Jordon Barrow DO                                                  
                                        Technologist: RT Carrol(R); 
RT Froilan(R)         Trnscmandy Date/Time/By: 03/10/2020 (5761) :By: 
Yash           Orig Print D/T: S: 03/10/2020 (0038)                        
PAGE  1              Signed Report- XR KNEE 1 OR 2 V RT2020-03-10 14:46:00 FAX: 
Jordon Doll DO   [phone]    [place]: Wire  St: 
REG-------------------------------
------------------------------------------------  Name:   THOM MELISSA                    : 1985  Age/S: 34/M       
   [address] [place] [address]         Unit#: [id number]      Loc: [place] [address]                 Phys: DanialAlpeshJordoncarolynn SOUSA                      
                     Acct: [id number] Dis Date:               Status: REG ER  
                              PHONE #: [phone]     Exam Date:     
03/10/2020     1435           FAX #: [phone]     Reason: KNEE PAIN         
                                EXAMS:                                          
   CPT CODE:      [id number] XR KNEE 1 OR 2 V RT             35913               
    PROCEDURE: Right knee 2 views               INDICATION: Knee pain post 
trauma, fall from a ladder.               COMPARISON: There are no previous 
relevant studiesavailable for       correlation.               FINDINGS: No 
bone, joint or soft tissue abnormality demonstrated.                 IMPRESSION:
Normal radiographs.                   SL:  KL-H     ** Electronically Signed by 
JED Escamilla **           **             on 03/10/2020 at 1446          
  **                      Reported and signed by: Shan Escamilla M.D.         
CC: Jordon Barrow DO                                                 
Technologist: Ramona Fajardo, RT(R); RT Froilan(R)         Thuys[place]andy 
Date/Time/By: 03/10/2020 (3753) : By: JaylinL           Orig Print D/T: S: 
03/10/2020 (2457)                         PAGE  1                       Signed 
Report- XR CHEST 2 -58-10 20:23:00 FAX: Michel Knowles MD 
[phone]    [place]:   St: PRE-------------------------------
------------------------------------------------  Name:   THOM MELISSA                    : 1985  Age/S: 34/M       
   [address] [place] [address]         Unit#: [id number]      Loc: [place] [address]                 Phys: Michel Umanzor MD                    
                       Acct: [id number] Dis Date:               Status: PRE ER
                                PHONE #: [phone]     Exam Date:     
2020           FAX #: [phone]     Reason: productive cough  
                                EXAMS:                                          
   CPT CODE:      [id number] XR CHEST 2 V             63482                    
PROCEDURE: Chest Radiograph.                Clinical Indication: Productive 
cough, chest congestion.               Comparison: Chest radiograph 2019.  
  FINDINGS:                The chest shows normal lung volumes without 
interstitial or airspace    opacities, pleural effusions or pneumothorax.       
                The heart size and pulmonary vasculature are normal. The trachea
is       midline. There are no clinically significant osseous abnormalities     
 noted.                  IMPRESSION:         1. No chest radiographic evidence 
of acute cardiopulmonary disease.                                       SL: 
OCO-H    ** Electronically Signed by JED Dalton **           **      
       on 2020 at              **                      Reported and 
signed by: Eitan Dalton M.D. CC: Michel Umanzor MD                       
                Technologist: RT Michelle(R)        Trnscrd 
Date/Time/By: 2020 () : By: SteffiTDO           Orig Print D/T: S: 
2020 ()                         PAGE  1                       Signed 
Report- CT ABD PELVIS [id number] 15:11:00  Name: [facility]                     : 1985 Age/S: 34  / M       
 [address] [facility]                Unit #: [id number]     Loc:               
[place]  [address]              Phys: Lady Limon NP              
                           Acct: [id number]  Dis Date:               Status: 
REG ER                                  PHONE #: [phone]     Exam Date: 
2019  1424                     FAX #: [phone]      Reason: L SIDE ABD
PAIN                                     EXAMS:                                 
             CPTCODE:      [id number] CT ABD PELVIS W/CONT                       
[address]                    REASON FOR EXAM: L SIDE ABD PAIN               EXAM 
ORDER DATE: 2019 10:33 AM               Ordering M.D.: Lady Limon NP               PROCEDURE:  - CT ABD PELVIS W/CONT  contrast-enhanced
axial CT images       were acquired through the abdomen/pelvis at 5 mm 
intervals.  Sagittal       and coronal reformatted images were generated.  
Automated exposure       control was utilized for this reduction.               
Phases of contrast: venous and delayed               COMPARISON: CT of the 
abdomen and pelvis 2019               FINDINGS:                
Visualized thorax: Normal.   Hepatobiliary system: Hepatomegaly. No masses or 
contour abnormality.       Gallbladder is withinnormal limits.               
Pancreas: Normal               Spleen: Normal               Adrenal glands: 
Normal               Genitourinary system: Normal               Gastrointestinal
tract and appendix: There are areas of fatty       metaplasia of the colonic 
submucosa which may be sequela of previous       infectious or inflammatory 
episodes. No acute findings. Specifically       no abnormal bowel distention or 
mural thickening or adjacent fat       stranding. Stomach and small intestine 
are within normal limits.               Abdominal vascular structures: Normal   
           Peritoneum and retroperitoneum: No free fluid or free air.  No 
omental       or mesenteric masses.  No abnormal lymphnodes.               
Musculoskeletal structures and abdominal wall: There are mild       degenerative
changes in the spine.                 IMPRESSION:     PAGE  1                   
   Signed Report                 (CONTINUED)   Name: THOM MELISSA           
      [facility]     : 1985 Age/S: 34  / M         [address] [facility]
               Unit #: [id number]     Loc:               [place]  [address]   
          Phys: Lady Limon NP                        Acct: 
[id number]  Dis Date:               Status: REG ER               PHONE #: 
[phone]     Exam Date: 2019  1424                     FAX #: [phone]
[phone]      Reason: L SIDE ABD PAIN                                     EXAMS:    
                    CPT CODE:      [id number] CT ABD PELVIS W/CONT               
       [address]            &lt;Continued&gt;          No acute intra-abdominal 
process.         Fatty metaplasia ofthe colonic submucosa may represent sequela 
of         previous episodes of infection and/or inflammation.          ** 
Electronically Signed by Mariano Milton MD on 2019 at 1511 **   Reported and 
signed by: Mariano Milton MD                                   CC: Lady Limon  NP; Elver Sandhu DO              Technologist:Zabrina Naik 
RT(R)(CT)     CTDI:        DLP:        Trnscb Date/Time: 2019 (1511) 
[id number]                       Orig Print D/T: S: 2019 (4630)      PAGE 
2                       Signed Report- CT HEAD/BRAIN W/O [id number] 14:36:00
 Name: [facility]                     :
1985 Age/S: 34  / M         [address]                Unit #: 
[id number]     Loc:               [place]  [address]              Phys: 
Lady Limon NP                                          Acct: 
[id number]  Dis Date:               Status: REG ER                            
     PHONE #: [phone]     Exam Date: 2019  142                     
FAX #: [phone]      Reason: HEADACHE                                       
    EXAMS:                                               CPTCODE:      [id number]
CT HEAD/BRAIN W/O CONT                     [address]                    HISTORY:  HE
ADACHE               TECHNIQUE: Noncontrast 2.5 mm axial CT of the head. 
Examination       acquired within 24 hours of arrival. Automated exposure 
control for       dose reduction.               COMPARISON: Noncontrast CT brain
2019               FINDINGS:               No lacerations or contusions
of the scalp or facial soft tissues..        Calvarium and skull base are 
intact. No acute hemorrhage. No intracranial mass, mass effect, or midline      
shift. No effacement of the sulci or grey-white matter interface to       
suggest acute infarct.                No cortical atrophy.  No signs of white 
matter small-vessel disease.               No hydrocephalus.. No extra-axial
fluid collection.                Polypoid mucosal thickening in the bilateral 
maxillary sinuses, worse       on the left side.        Postsurgical changes of 
right-sided mastoidectomy. Partial       opacification of the left mastoid air 
cells, similar to prior exam.       Orbital contents are unremarkable.          
               IMPRESSION:                   No acute findings and no 
significant change from previous exam.         Partial opacification of the left
mastoid air cells. Correlate clinically for mastoiditis.          ** 
Electronically Signed by Mariano Milton MD on 2019 at 1436 **                
     Reported and signed by: Mariano Milton MD      CC: Lady Limon  NP;
Elver Sandhu DO        Technologist:Zabrina Naik RT(R)(CT)     CTDI:      
 DLP:        Trnscb Date/Time: 2019 (1436) [email or web address]                     
 Orig Print D/T: S: 2019 (7058)      PAGE  1                 Signed Report
URINALYSIS [id number] 13:08:00





             Test Item    Value        Reference Range Interpretation Comments

 

             UA COLOR (test code = COLU) YELLOW       YELLOW                    

 

             UA APPEARANCE (test code = CLEAR        CLEAR                     



             APPU)                                               

 

             UA GLUCOSE DIPSTICK (test >1000 (4+) mg/dL NEGATIVE                

  



             code = DGLUU)                                        

 

             UA BILIRUBIN DIPSTICK (test NEGATIVE mg/dL NEGATIVE                

  



             code = BILU)                                        

 

             UA KETONE DIPSTICK (test NEGATIVE mg/dL NEGATIVE                  



             code = KETU)                                        

 

             UA SPECIFIC GRAVITY (test 1.028        1.001-1.035               



             code = SGU)                                         

 

             UA BLOOD DIPSTICK (test code Negative mg/dL NEGATIVE               

   



             = INDIO)                                              

 

             UA PH DIPSTICK (test code = 5.5          5.0-8.0                   



             GERRY)                                                

 

             UA PROTEIN DIPSTICK (test 30 (1+) mg/dL NEGATIVE     A            



             code = PROU)                                        

 

             UA UROBILINIOGEN DIPSTICK Normal mg/dL NEGATIVE                  



             (test code = URO)                                        

 

             UA NITRITE DIPSTICK (test NEGATIVE     NEGATIVE                  



             code = KRISTIN)                                        

 

             UA LEUKOCYTE ESTERASE W NEGATIVE Blayne/uL NEGATIVE                  



             REFLEX (test code = LEUUR)                                        

 

             UA WBC (test code = WBCU) 0-5 per HPF  0-5                       

 

             UA RBC (test code = RBCU) 0-2 #/HPF    0-5                       

 

             UA EPITHELIAL CELLS (test FEW per HPF  FEW                       



             code = EPIU)                                        

 

             UA BACTERIA (test code = FEW #/HPF    NONE                      



             BACU)                                               

 

             UA URIC ACID CRYSTALS (test FEW #/HPF    NONE                      



             code = URIU)                                        

 

             UA MUCUS (test code = MUCU) FEW #/LPF    FEW                       



Urine Source? Clean CatchDRUGS OF ABUSE SCREEN [id number] 13:08:00





             Test Item    Value        Reference Range Interpretation Comments

 

             URN COCAINE (test code = COCAURN) NEGATIVE     <300 ng/mL          

      

 

             URN CANNABINOIDS (test code = NEGATIVE     <50 ng/mL               

  



             CANNABURN)                                          

 

             URN AMPHETAMINE (test code = NEGATIVE     <1000 ng/mL              

 



             AMPHETURN)                                          

 

             URN BARBITURATE (test code = NEGATIVE     <200 ng/mL               

 



             BARBITURN)                                          

 

             URN BENZODIAZEPINE (test code = NEGATIVE     <200 ng/mL            

    



             BENZOURN)                                           

 

             URN OPIATES (test code = OPIATURN) NEGATIVE     <300 ng/mL         

       

 

             URN PHENCYCLIDINE (PCP) (test code = NEGATIVE     <25 ng/mL        

         



             PHENCURN)                                           

 

             URN METHADONE (test code = METHAURN) NEGATIVE     <300 ng/mL       

         



Urine Source? Clean CatchURINALYSIS [id number] 12:39:00





             Test Item    Value        Reference Range Interpretation Comments

 

             UA COLOR (test code = COLU)              YELLOW                    

 

             UA APPEARANCE (test code = APPU)              CLEAR                

     

 

             UA BILIRUBIN DIPSTICK (test code =              NEGATIVE           

       



             BILU)                                               

 

             UA SPECIFIC GRAVITY (test code =              1.001-1.035          

     



             SGU)                                                

 

             UA PH DIPSTICK (test code = GERRY)              5.0-8.0              

     

 

             UA UROBILINIOGEN DIPSTICK (test code  mg/dL       0.0-0.2          

         



             = URO)                                              

 

             UA NITRITE DIPSTICK (test code =              NEGATIVE             

     



             KRISTIN)                                               

 

             UA LEUKOCYTE ESTERASE W REFLEX (test              NEGATIVE         

         



             code = LEUUR)                                        

 

             UA WBC (test code = WBCU)  per HPF     0-5                       

 

             UA RBC (test code = RBCU)  per HPF     0-5                       

 

             UA EPITHELIAL CELLS (test code =  per HPF     Few                  

     



             EPIU)                                               

 

             UA BACTERIA (test code = BACU)  per HPF     NONE                   

   



Urine Source? Clean CatchDRUGS OF ABUSE SCREEN [id number] 12:39:00





             Test Item    Value        Reference Range Interpretation Comments

 

             URN COCAINE (test code = COCAURN) NEGATIVE     <300 ng/mL          

      

 

             URN CANNABINOIDS (test code = NEGATIVE     <50 ng/mL               

  



             CANNABURN)                                          

 

             URN AMPHETAMINE (test code = NEGATIVE     <1000 ng/mL              

 



             AMPHETURN)                                          

 

             URN BARBITURATE (test code = NEGATIVE     <200 ng/mL               

 



             BARBITURN)                                          

 

             URN BENZODIAZEPINE (test code = NEGATIVE     <200 ng/mL            

    



             BENZOURN)                                           

 

             URN OPIATES (test code = OPIATURN) NEGATIVE     <300 ng/mL         

       

 

             URN PHENCYCLIDINE (PCP) (test code = NEGATIVE     <25 ng/mL        

         



             PHENCURN)                                           

 

             URN METHADONE (test code = METHAURN) NEGATIVE     <300 ng/mL       

         



Urine Source? Clean CatchBASIC METABOLIC [id number] 11:59:00





             Test Item    Value        Reference Range Interpretation Comments

 

             SODIUM (test code = 134 mmol/L   136-145      L            



             NA)                                                 

 

             POTASSIUM (test code 4.9 mmol/L   3.5-5.1      N            



             = K)                                                

 

             CHLORIDE (test code = 101.0 mmol/L        N            



             CL)                                                 

 

             CARBON DIOXIDE (test 23.0 mmol/L  21-32        N            



             code = CO2)                                         

 

             ANION GAP (test code 14.9         10-20        N            



             = GAP)                                              

 

             GLUCOSE (test code = 251 mg/dL           H            



             GLU)                                                

 

             BLOOD UREA NITROGEN 9 mg/dL      7-18         N            



             (test code = BUN)                                        

 

             GLOMERULAR FILTRATION > 60 mL/min  >=60                      Estima

abelardo GFR by



             RATE (test code =                                        using Madi

fied MDRD



             GFR)                                                formula.Chronic



                                                                 kidney disease 

is



                                                                 defined as [facility]

er



                                                                 kidney damageor

 GFR



                                                                 <60 mL/min/1.73

 m2



                                                                 for >3 months.

 

             CREATININE (test code 1.10 mg/dL   0.7-1.3      N            



             = CREAT)                                            

 

             BUN/CREATININE RATIO 8.2          10-20        L            



             (test code =                                        



             BUN/CREA)                                           

 

             CALCIUM (test code = 9.3 mg/dL    8.5-10.1     N            



             CA)                                                 



HEPATIC FUNCTION [id number] 11:59:00





             Test Item    Value        Reference Range Interpretation Comments

 

             TOTAL PROTEIN (test 8.2 gram/dL  6.4-8.2      N            



             code = PROT)                                        

 

             ALBUMIN (test code = 3.9 g/dL     3.4-5.0      N            



             ALB)                                                

 

             GLOBULIN (test code = 4.3 gram/dL  2.7-4.2      H            



             GLOB)                                               

 

             ALBUMIN/GLOBULIN RATIO 0.9          0.75-1.50    N            



             (test code = A/G)                                        

 

             BILIRUBIN TOTAL (test 0.70 mg/dL   0.0-1.0      N            



             code = BILT)                                        

 

             BILIRUBIN DIRECT (test 0.08 mg/dL   0.0-0.20     N            



             code = BILD)                                        

 

             SGOT/AST (test code = 92 IUnit/L   15-37        H            



             AST)                                                

 

             SGPT/ALT (test code = 84 IUnit/L   12-78        H            



             ALT)                                                

 

             ALKALINE PHOSPHATASE 70 IUnit/L          N            **Note 

change in



             TOTAL (test code =                                        reference

 range due



             ALKP)                                               to change in



                                                                 reagent.**



[id number] 11:59:00





             Test Item    Value        Reference Range Interpretation Comments

 

             LIPASE (test code = LIP) 129 U/L      73.0-393.0   N            



BASIC METABOLIC [id number] 11:58:00





             Test Item    Value        Reference Range Interpretation Comments

 

             SODIUM (test code = 134 mmol/L   136-145      L            



             NA)                                                 

 

             POTASSIUM (test code 4.9 mmol/L   3.5-5.1      N            



             = K)                                                

 

             CHLORIDE (test code = 101.0 mmol/L        N            



             CL)                                                 

 

             CARBON DIOXIDE (test 23.0 mmol/L  21-32        N            



             code = CO2)                                         

 

             ANION GAP (test code 14.9         10-20        N            



             = GAP)                                              

 

             GLUCOSE (test code = 251 mg/dL           H            



             GLU)                                                

 

             BLOOD UREA NITROGEN 9 mg/dL      7-18         N            



             (test code = BUN)                                        

 

             GLOMERULAR FILTRATION > 60 mL/min  >=60                      Estima

abelardo GFR by



             RATE (test code =                                        using Madi

fied MDRD



             GFR)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                    (CONTINUED)   Name: THOM MELISSA                    : 1985 Age/S: 34  / M         [address] [place] [address]         Unit #: [id number]     Loc:               [place] [address]     Phys: Onur Mendez MD                                                 
      Acct: [id number]  Dis Date:               Status: REG ER                
                 PHONE #: [phone]     ExamDate: 2019           
         FAX #: [phone]      Reason: HEADACHE                           
EXAMS:                                               CPT CODE:      [id number] CT
HEAD/BRAIN W/O CONT                     37194               &lt;Continued&gt;   
    The lungapices reveal no acute process.                   IMPRESSION:       
           CT HEAD:      1.  There is no acute intracranial process.         2. 
There is decreased chronic paranasal sinus disease.         3.  There is a 
stable moderate left mastoid air cell and middle ear         effusion.          
        CT CERVICAL SPINE:                   1.  There is no acute cervical 
spine fracture or dislocation.         2.  There is moderate palatine tonsillar 
hypertrophy, likely reactive.                                       ** 
Electronically Signed by NAV Minor **            **            on 
2019 at 194            **                      Reported and signed by: 
NAV Mullins                    CC: Onur Mendez MD                           
                                           Technologist:George Garrido, RT(R); 
Marisela BEAN  CTDI:        DLP:        Trnscb Date/Time: 2019 () 
[id number]        Orig Print D/T: S: 2019 ()      PAGE  2             
         Signed Report- CT C-SPINE W/O [id number] 19:41:00  Name: 
THOM MELISSA                    : 
1985 Age/S: 34  / M         [address] [place] [address]         Unit #: 
[id number]     Loc:               [place], [id number]                 Phys: 
Onur Mendez MD                                                        Acct: 
[id number]  Dis Date:               Status: REG ER                            
     PHONE #: [phone]     Exam Date: 2019                     
FAX #: [phone]      Reason: NECK PAIN                                      
    EXAMS:                                               CPTCODE:      [id number]
CT C-SPINE W/O CONT                        27524                    UNENHANCED C
T HEAD, UNENHANCED CT CERVICAL SPINE               INDICATION: Head pain and 
neck pain after an injury.  Altered mental       status               TECHNIQUE:
Unenhanced CT was performed from the skull vertex to the       foramen magnum 
with axial, coronal and sagittal reconstructions.        Radiationdose length 
product 472 mGy-cm.  Unenhanced CT was performed       of the cervical spine 
with axial,coronal and sagittal       reconstructions.  CT imaging performed at 
this location utilizes       radiation dose optimization technique which 
includes one or more of       the followin) Automated exposure control; 2) 
Adjustment of the mA       and/or kV according to patient's size; 3) Use of 
iterative       reconstruction techniques.  DLP (mGy-cm): 330               
COMPARISONS: CT head and cervical spine 2010               FINDINGS:       
       CT HEAD:       There is decreased paranasalsinus disease with mild 
mucosal       thickening of the maxillary, ethmoid and frontal sinuses but no 
fluid       level.               There chronic surgical changes of right 
mastoidectomy.  There is a      stable moderate left mastoid air cell and middle
ear effusion.                There is no acute depressed skull fracture.        
      The cerebral ventricles are normal caliber. There is no cerebral mass     
 effect, midline shift, intracranial hemorrhage or acute large vessel       
territorycerebral cortical edema.                 CT CERVICAL SPINE:       There
is an incidental pseudoarthrosis of the left craniocervical       junction, a 
developmental segmentation anomaly.  There is no acute       cervical spine 
fracture or dislocation.  There is no spinal canal       stenosis.    There is 
moderate palatine tonsillar hypertrophy.  There is no       peritonsillar 
abscess detected.               There is no cervical lymphadenopathy, mass or 
organized fluid       collection.         PAGE  1                       Signed 
Report                    (CONTINUED)   Name: THOM MELISSA                  
[facility]                    : 1985 Age/S: 34  / M         [address] [place] [address]         Unit #: [id number]     Loc:               [place] [address]     Phys: Onur Mendez MD                                                 
      Acct: [id number]  Dis Date:               Status: REG ER                
                 PHONE #: [phone]     ExamDate: 2019           
         FAX #: [phone]      Reason: NECK PAIN                           
EXAMS:                                               CPT CODE:      [id number] CT
C-SPINE W/O CONT                        19456               &lt;Continued&gt;   
    The lungapices reveal no acute process.                   IMPRESSION:       
           CT HEAD:      1.  There is no acute intracranial process.         2. 
There is decreased chronic paranasal sinus disease.         3.  There is a 
stable moderate left mastoid air cell and middle ear         effusion.          
        CT CERVICAL SPINE:                   1.  There is no acute cervical 
spine fracture or dislocation.         2.  There is moderate palatine tonsillar 
hypertrophy, likely reactive.                                       ** 
Electronically Signed by NAV Minor **            **            on 
2019 at 1941            **                      Reported and signed by: 
NAV Mullins                    CC: Onur Mendez MD                           
                                           Technologist:George Garrido, RT(R); 
Marisela BEAN  CTDI:        DLP:        Trnscb Date/Time: 2019 () 
[id number]        Orig Print D/T: S: 2019 ()      PAGE  2             
         Signed [id number] 18:52:00





             Test Item    Value        Reference Range Interpretation Comments

 

             ALCOHOL (test code < 0.003 G/dL <0.003                    Ethyl Alc

ohol



             = ALC)                                              Interpretation:



                                                                  0.100 gm/dL   

    -



                                                                 Legally Intoxic

ated



                                                                      0.300-0.40

0 gm/dL



                                                                 - Severely Into

xicated



                                                                         >0.400 

gm/dL



                                                                   - Potentially



                                                                 LethalResults a

re for



                                                                 Medical purpose

s only,



                                                                 and not for Leg

al



                                                                 orEmployment ev

aluation



                                                                 purposes.



CHEMISTRY 8 [id number] 18:37:00





             Test Item    Value        Reference Range Interpretation Comments

 

             ISTAT-SODIUM (test code = NAP)  MMOL/L      134-147                

   

 

             ISTAT-POTASSIUM (test code = KP)  MMOL/L      3.4-5.0              

     

 

             ISTAT-CHLORIDE (test code = CLP)  MMOL/L      100-108              

     

 

             ISTAT CARBON DIOXIDE (test code =  mmol/L      21-33        N      

      



             ISTAT-CO2)                                          

 

             ISTAT CALCIUM IONIZED (test code =  MG/DL       1.12-1.32          

       



             ISTAT-CARRINGTON)                                          

 

             ISTAT-GLUCOSE (test code = GLUP)  MG/DL              H       

     

 

             ISTAT-BUN (test code = BUNP)  MG/DL       7-18         N           

 

 

             BEDSIDE CREATININE (test code =  MG/DL       0.6-1.3      N        

    



             CREATBED)                                           

 

             GLOMERULAR FILTRATION RATE POC 82 ML/MIN                           

   



             (test code = GFRBED)                                        



CHEMISTRY 8 [id number] 18:37:00





             Test Item    Value        Reference Range Interpretation Comments

 

             ISTAT-SODIUM (test 133 MMOL/L   134-147      L            



             code = NAP)                                         

 

             ISTAT-POTASSIUM (test 4.2 MMOL/L   3.4-5.0      N            



             code = KP)                                          

 

             ISTAT-CHLORIDE (test 101 MMOL/L   100-108      N            Perform

ed by



             code = CLP)                                         certified opera

tor



                                                                 at  [facility]

 

             ISTAT CARBON DIOXIDE 22.0 mmol/L  21-33        N            



             (test code =                                        



             ISTAT-CO2)                                          

 

             ISTAT CALCIUM IONIZED 1.15 MG/DL   1.12-1.32    N            



             (test code =                                        



             ISTAT-CARRINGTON)                                          

 

             ISTAT-GLUCOSE (test 174 MG/DL           H            



             code = GLUP)                                        

 

             ISTAT-BUN (test code = 9 MG/DL      7-18         N            



             BUNP)                                               

 

             BEDSIDE CREATININE 1.1 MG/DL    0.6-1.3      N            



             (test code = CREATBED)                                        

 

             GLOMERULAR FILTRATION 82 ML/MIN                              



             RATE POC (test code =                                        



             GFRBED)                                             



- XR CHEST 1 -82-30 18:37:00 FAX: Onur Kapoor MD         [phone]
   [place]:   St: REG-------------------------------
------------------------------------------------  Name:   THOM MELISSA      
          [facility]                    : 1985  Age/S: 34/M       
   [address] [place] [address]         Unit#: [id number]      Loc: [place] [address]                 Phys: Onur Mendez MD                            
               Acct: [id number] Dis Date:               Status: REG ER        
                        PHONE #: [phone]     Exam Date:     2019     
1836           FAX #: [phone]     Reason: CHEST PAIN                       
                 EXAMS:                                              CPT CODE:  
   [id number] XR CHEST 1 V             90753                    Single portable 
AP chest               INDICATION: Acute chestpain post injury.               
COMPARISON: 2015 chest radiographs               FINDINGS: Examlimited by 
patient body habitus and portable technique.        The cardiomediastinal 
silhouette is upper limits of normal for size       or mildly enlarged.  The 
lungs are underinflated crowding the   bronchovascular markings but otherwise 
appear clear.  No pleural       effusion identified.  No acute bony finding 
identified.                 IMPRESSION: No acute findings.   SL: SG-H           
        ** Electronically Signed by JED Quiroz **            **       
on 2019 at 1837            **                      Reported and signed by:
Celestino Quiroz M.D.                       CC: Onur Mendez MD                      
                                              Technologist: MIKEL Martins(R)R                                Trnscrd Date/Time/By: 2019 () : 
By: [email or web address]  Orig Print D/T: S: 2019 (4320)                         PAGE
 1                       Signed ReportCBC W/AUTO [id number] 18:36:00





             Test Item    Value        Reference Range Interpretation Comments

 

             WHITE BLOOD CELL (test code = 9.94 x10 3/uL 4.5-11.0     N         

   



             WBC)                                                

 

             RED BLOOD CELL (test code = 4.65 x10 6/uL 4.00-5.60    N           

 



             RBC)                                                

 

             HEMOGLOBIN (test code = HGB) 14.4 g/dL    12.5-16.9    N           

 

 

             HEMATOCRIT (test code = HCT) 41.7 %       37.5-50.7    N           

 

 

             MEAN CELL VOLUME (test code = 89.7 fL      81.0-99.0    N          

  



             MCV)                                                

 

             MEAN CELL HGB (test code = MCH) 31.0 pg      27.0-33.0    N        

    

 

             MEAN CELL HGB CONCETRATION 34.5 g/dL    33.0-37.0    N            



             (test code = MCHC)                                        

 

             RED CELL DISTRIBUTION WIDTH CV 11.9 %       11.5-14.5    N         

   



             (test code = RDW)                                        

 

             RED CELL DISTRIBUTION WIDTH SD 39.2 fL      37.0-54.0    N         

   



             (test code = RDW-SD)                                        

 

             PLATELET COUNT (test code = 214 x10 3/uL 150-400      N            



             PLT)                                                

 

             MEAN PLATELET VOLUME (test code 10.9 fL      7.0-9.0      H        

    



             = MPV)                                              

 

             NEUTROPHIL % (test code = NT%) 54.4 %       56.0-77.0    L         

   

 

             IMMATURE GRANULOCYTE % (test 0.8 %        0.0-2.0      N           

 



             code = IG%)                                         

 

             LYMPHOCYTE % (test code = LY%) 34.4 %       14.0-32.0    H         

   

 

             MONOCYTE % (test code = MO%) 7.1 %        4.8-9.0      N           

 

 

             EOSINOPHIL % (test code = EO%) 2.1 %        0.3-3.7      N         

   

 

             BASOPHIL % (test code = BA%) 1.2 %        0.0-2.0      N           

 

 

             NUCLEATED RBC % (test code = 0.0 %        0-0          N           

 



             NRBC%)                                              

 

             NEUTROPHIL # (test code = NT#) 5.40 x10 3/uL 2.0-7.6      N        

    

 

             IMMATURE GRANULOCYTE # (test 0.08 x10 3/uL 0.00-0.03    H          

  



             code = IG#)                                         

 

             LYMPHOCYTE # (test code = LY#) 3.42 x10 3/uL 1.0-3.8      N        

    

 

             MONOCYTE # (test code = MO#) 0.71 x10 3/uL 0.1-0.8      N          

  

 

             EOSINOPHIL # (test code = EO#) 0.21 x10 3/uL 0.0-0.2      H        

    

 

             BASOPHIL # (test code = BA#) 0.12 x10 3/uL 0.0-0.2      N          

  

 

             NUCLEATED RBC # (test code = 0.00 x10 3/uL 0.0-0.1      N          

  



             NRBC#)                                              

 

             MANUAL DIFF REQUIRED (test code NO                                 

    



             = MDIFF)                                            



COMPREHENSIVE METABOLIC [id number] 11:48:00





             Test Item    Value        Reference Range Interpretation Comments

 

             SODIUM (test code = 138 mmol/L   135-148      N            



             NA)                                                 

 

             POTASSIUM (test code = 4.0 mmol/L   3.5-5.1      N            



             K)                                                  

 

             CHLORIDE (test code = 103 mmol/L   101-109      N            



             CL)                                                 

 

             CARBON DIOXIDE (test 25.3 mmol/L  21-32        N            



             code = CO2)                                         

 

             ANION GAP (test code = 14 mmol/L    10-20        N            



             GAP)                                                

 

             GLUCOSE (test code = 185 mg/dL           H            



             GLU)                                                

 

             BLOOD UREA NITROGEN 12 mg/dL     3-21         N            



             (test code = BUN)                                        

 

             CREATININE (test code 1.32 mg/dL   0.55-1.3     H            



             = CREAT)                                            

 

             BUN/CREATININE RATIO 9.1          10-20        L            



             (test code = BUN/CREA)                                        

 

             TOTAL PROTEIN (test 7.8 g/dL     6.5-8.4      N            



             code = PROT)                                        

 

             ALBUMIN (test code = 4.0 g/dL     3.4-4.8      N            



             ALB)                                                

 

             GLOBULIN (test code = 3.8 G/DL     1-10         N            



             GLOB)                                               

 

             ALBUMIN/GLOBULIN RATIO 1.1 RATIO    0.75-1.50    N            



             (test code = A/G)                                        

 

             CALCIUM (test code = 8.4 mg/dL    8.4-10.2     N            



             CA)                                                 

 

             BILIRUBIN TOTAL (test 0.50 mg/dL   0.0-1.0      N            



             code = BILT)                                        

 

             SGOT/AST (test code = 27 U/L       6-32         N            



             AST)                                                

 

             SGPT/ALT (test code = 53 U/L       12-78        N            Note: 

Change in



             ALT)                                                REFERENCE RANGE

 due



                                                                 to new reagent



                                                                 method.

 

             ALKALINE PHOSPHATASE 55 U/L              N            



             TOTAL (test code =                                        



             ALKP)                                               



[id number] 11:48:00





             Test Item    Value        Reference Range Interpretation Comments

 

             LIPASE (test code = LIP) 168 U/L      128-270      N            



[id number] 11:48:00





             Test Item    Value        Reference Range Interpretation Comments

 

             TROPONIN-I (test code = TROPI) <0.015 ng/mL 0.00-0.056   N         

   



COMPREHENSIVE METABOLIC [id number] 11:23:00





             Test Item    Value        Reference Range Interpretation Comments

 

             SODIUM (test code = 138 mmol/L   135-148      N            



             NA)                                                 

 

             POTASSIUM (test code = 4.0 mmol/L   3.5-5.1      N            



             K)                                                  

 

             CHLORIDE (test code = 103 mmol/L   101-109      N            



             CL)                                                 

 

             CARBON DIOXIDE (test 25.3 mmol/L  21-32        N            



             code = CO2)                                         

 

             ANION GAP (test code = 14 mmol/L    10-20        N            



             GAP)                                                

 

             GLUCOSE (test code = 185 mg/dL           H            



             GLU)                                                

 

             BLOOD UREA NITROGEN 12 mg/dL     3-21         N            



             (test code = BUN)                                        

 

             CREATININE (test code 1.32 mg/dL   0.55-1.3     H            



             = CREAT)                                            

 

             BUN/CREATININE RATIO 9.1          10-20        L            



             (test code = BUN/CREA)                                        

 

             TOTAL PROTEIN (test 7.8 g/dL     6.5-8.4      N            



             code = PROT)                                        

 

             ALBUMIN (test code = 4.0 g/dL     3.4-4.8      N            



             ALB)                                                

 

             GLOBULIN (test code = 3.8 G/DL     1-10         N            



             GLOB)                                               

 

             ALBUMIN/GLOBULIN RATIO 1.1 RATIO    0.75-1.50    N            



             (test code = A/G)                                        

 

             CALCIUM (test code = 8.4 mg/dL    8.4-10.2     N            



             CA)                                                 

 

             BILIRUBIN TOTAL (test 0.50 mg/dL   0.0-1.0      N            



             code = BILT)                                        

 

             SGOT/AST (test code = 27 U/L       6-32         N            



             AST)                                                

 

             SGPT/ALT (test code = 53 U/L       12-78        N            Note: 

Change in



             ALT)                                                REFERENCE RANGE

 due



                                                                 to new reagent



                                                                 method.

 

             ALKALINE PHOSPHATASE 55 U/L              N            



             TOTAL (test code =                                        



             ALKP)                                               



[id number] 11:23:00





             Test Item    Value        Reference Range Interpretation Comments

 

             LIPASE (test code = LIP) 168 U/L      128-270      N            



[id number] 11:23:00





             Test Item    Value        Reference Range Interpretation Comments

 

             TROPONIN-I (test code = TROPI)  ng/mL       0.00-0.056   N         

   



- CT ABD PELVIS W/O [id number] 11:21:00  [facility]     : 1985 Age/S: 33  / M         
[address] [facility]           Unit #: [id number]     Loc:               
[place] [id number]                Phys: Elise Batres MD                      
                           Acct: [id number]  Dis Date:               Status: 
REG ER                                  PHONE #: [phone]     Exam Date: 
2019  1107                     FAX #: [phone]      Reason: severe l
eft sided pain radiating to abdomen         EXAMS:                              
                CPTCODE:      [id number] CT ABD PELVIS W/O CONT                  
  [id number]                    HISTORY: Severe left-sided pain radiating to the 
abdomen.               COMPARISON: February 3, 2019.   CT abdomen and pelvis: 
Stone protocol. Automated exposure control.               CT ABDOMEN:         
The lung bases are clear.               Noncontrast liver is within normal 
limits. No discrete mass is noted.       The liver is measuring 25.4 cm in 
length. Gallbladder is without       radiopaque stones.               
Unremarkable spleen. The stomach distended incompletely but it is       normal 
in appearance.               Noncontrast pancreas is unremarkable. Adrenals are 
normal.      Right kidney is free from hydroureteronephrosis. No calyceal 
stones.       Worsening moderate left hydroureteronephrosis from previous exam. 
             No pathologic adenopathy. No bowel obstruction or colitis or       
diverticulitis or enteritis.               CT PELVIS:               Appendix is 
normal. Pelvic bowel loops are unobstructed.                Left hydroureter 
with 4.8 mm obstructing stone in the mid iliac level       noted again is 
unchanged from previous examination with worsening       hydroureter. 
Unremarkable incompletely distended urinary bladder. The       prostate is not 
enlarged. No pelvic pathologic adenopathy. No free       fluid or free air.     
         Subcutaneous tissues and the musculature are normal in appearance. No  
    lytic or blastic lesions are noted within the bony skeleton.                
IMPRESSION:                   Worsening moderate and lefthydroureteronephrosis 
with obstructing         stone measuring 4.8 mm in the distal left ureter at the
mid iliac         level which is also unchanged in position. No calyceal stones.
No         hydroureteronephrosis on the right. Unremarkable incompletely        
distended urinary bladder.     PAGE  1                       Signed Report      
             (CONTINUED)   Name: WORKIZERTHOM         [facility]     : 1985 Age/S: 33  / M         [address] [facility]       
   Unit #: [id number]     Loc:               [place] [address]                
Phys: Elise Braxton MD                                                  Acct: 
[id number]  Dis Date:      Status: REG ER                                  
PHONE #: [phone]     Exam Date: 2019  1107                     FAX #:
[phone]      Reason: severe left sided pain radiating to abdomen         
EXAMS:                                               CPT CODE:      [id number] CT
ABD PELVIS W/O CONT                     [id number]               &lt;Continued&gt;   
                Normal appendix without bowel obstruction or colitis or 
diverticulitis         or enteritis. Hepatomegaly.  ** Electronically Signed by 
JED Cleary on 2019 at 1121 **                      Reported and 
signed by: Rc Cleary M.D.                                   CC: Elise Batres MD        Technologist:SHEMAR GILLIS RT(R),CT       CTDI:        
DLP:        Trnscb Date/Time: 2019 (1121) t.SDR.TH4                       
Orig Print D/T: S: 2019 (1124)       CTDI:   DLP:          PAGE  2        
              Signed ReportCOMPREHENSIVE METABOLIC [id number] 11:12:00





             Test Item    Value        Reference Range Interpretation Comments

 

             SODIUM (test code = NA) 138 mmol/L   135-148      N            

 

             POTASSIUM (test code = K) 4.0 mmol/L   3.5-5.1      N            

 

             CHLORIDE (test code = CL) 103 mmol/L   101-109      N            

 

             CARBON DIOXIDE (test code = CO2) 25.3 mmol/L  21-32        N       

     

 

             ANION GAP (test code = GAP) 14 mmol/L    10-20        N            

 

             GLUCOSE (test code = GLU) 185 mg/dL           H            

 

             BLOOD UREA NITROGEN (test code = 12 mg/dL     3-21         N       

     



             BUN)                                                

 

             CREATININE (test code = CREAT) 1.32 mg/dL   0.55-1.3     H         

   

 

             BUN/CREATININE RATIO (test code = 9.1          10-20        L      

      



             BUN/CREA)                                           

 

             TOTAL PROTEIN (test code = PROT)  gram/dL     6.4-8.2              

     

 

             ALBUMIN (test code = ALB)  g/dL        3.4-5.0                   

 

             GLOBULIN (test code = GLOB)  g/dL        2.7-4.2                   

 

             ALBUMIN/GLOBULIN RATIO (test code              0.75-1.50           

      



             = A/G)                                              

 

             CALCIUM (test code = CA) 8.4 mg/dL    8.4-10.2     N            

 

             BILIRUBIN TOTAL (test code =  mg/dL       0.2-1.2                  

 



             BILT)                                               

 

             SGOT/AST (test code = AST)  IUnit/L     15-37                     

 

             SGPT/ALT (test code = ALT)  U/L         10-69                     

 

             ALKALINE PHOSPHATASE TOTAL (test  IUnit/L                    

     



             code = ALKP)                                        



[id number] 11:12:00





             Test Item    Value        Reference Range Interpretation Comments

 

             LIPASE (test code = LIP)  Unit/L      144-286                   



[id number] 11:12:00





             Test Item    Value        Reference Range Interpretation Comments

 

             TROPONIN-I (test code = TROPI)  ng/mL       0-0.045                

   



URINALYSIS [id number] 11:04:00





             Test Item    Value        Reference Range Interpretation Comments

 

             UA COLOR (test code STRAW        YELLOW                    



             = COLU)                                             

 

             UA APPEARANCE (test CLEAR        CLEAR                     



             code = APPU)                                        

 

             UA GLUCOSE DIPSTICK NORMAL mg/dL NEGATIVE                  



             (test code = DGLUU)                                        

 

             UA BILIRUBIN NEGATIVE mg/dL NEGATIVE                  



             DIPSTICK (test code                                        



             = BILU)                                             

 

             UA KETONE DIPSTICK neg mg/dL    NEGATIVE                  



             (test code = KETU)                                        

 

             UA SPECIFIC GRAVITY 1.010        1.001-1.035               



             (test code = SGU)                                        

 

             UA BLOOD DIPSTICK 150 (3+) Gume/uL NEGATIVE     A            



             (test code = INDIO)                                        

 

             UA PH DIPSTICK (test 5.0          5.0-8.0                   



             code = GERRY)                                         

 

             UA PROTEIN DIPSTICK neg mg/dL    Neg-15                    



             (test code = PROU)                                        

 

             UA UROBILINIOGEN norm mg/dL   0.0-0.2                   



             DIPSTICK (test code                                        



             = URO)                                              

 

             UA NITRITE DIPSTICK NEGATIVE     NEGATIVE                  



             (test code = KRISTIN)                                        

 

             UA LEUKOCYTE NEGATIVE uL  NEGATIVE                  



             ESTERASE DIPSTICK                                        



             (test code = LEUU)                                        

 

             UA WBC (test code = 0-5 per HPF  0-5                       IN SOME 

URINARY



             WBCU)                                               TRACT INFECTION

S



                                                                 THERE MAY NOT B

E



                                                                 ENOUGHWBCs IN T

HE



                                                                 URINE TO TRIGGE

R AN



                                                                 AUTOMATIC (REFL

EX)



                                                                 URINECULTURE. A



                                                                 SEPERATE ORDER 

FOR



                                                                 URINE CULTURE I

S



                                                                 RECOMMENDEDIF T

HERE



                                                                 IS STRONG SUPPO

RT



                                                                 FOR A URINARY T

RACT



                                                                 INFECTIONCLINIC

ALLY



                                                                 .

 

             UA RBC (test code = 6-10 per HPF 0-5                       



             RBCU)                                               

 

             UA EPITHELIAL CELLS Rare (0-1/hpf) Few                       



             (test code = EPIU) per HPF                                

 

             UA BACTERIA (test TRACE per HPF NONE                      



             code = BACU)                                        

 

             UA MUCUS (test code FEW per LPF  NONE-FEW                  



             = MUCU)                                             



Urine Source? Clean CatchDRUGS OF ABUSE SCREEN [id number] 11:04:00





             Test Item    Value        Reference Range Interpretation Comments

 

             URN COCAINE (test code = COCAURN) NEGATIVE     NEGATIVE            

      

 

             URN CANNABINOIDS (test code = NEGATIVE     NEGATIVE                

  



             CANNABURN)                                          

 

             URN AMPHETAMINE (test code = NEGATIVE     NEGATIVE                 

 



             AMPHETURN)                                          

 

             URN BARBITURATE (test code = NEGATIVE     NEGATIVE                 

 



             BARBITURN)                                          

 

             URN BENZODIAZEPINE (test code = NEGATIVE     NEGATIVE              

    



             BENZOURN)                                           

 

             URN OPIATES (test code = OPIATURN) NEGATIVE     NEGATIVE           

       

 

             URN PHENCYCLIDINE (PCP) (test code = NEGATIVE     NEGATIVE         

         



             PHENCURN)                                           



Urine Source? Clean CatchCBC W/AUTO [id number] 11:00:00





             Test Item    Value        Reference Range Interpretation Comments

 

             WHITE BLOOD CELL (test code = 7.7 K/mm3    4.5-12.5     N          

  



             WBC)                                                

 

             RED BLOOD CELL (test code = 4.26 mill/mm3 4.0-5.8      N           

 



             RBC)                                                

 

             HEMOGLOBIN (test code = HGB) 13.1 gram/dL 13.0-17.5    N           

 

 

             HEMATOCRIT (test code = HCT) 38.5 %       42.0-52.0    L           

 

 

             MEAN CELL VOLUME (test code = 90.4 fL      80-98        N          

  



             MCV)                                                

 

             MEAN CELL HGB (test code = MCH) 30.8 picogram 27.0-33.0    N       

     

 

             MEAN CELL HGB CONCETRATION 34.0 gram/dL 33.0-36.0    N            



             (test code = MCHC)                                        

 

             RED CELL DISTRIBUTION WIDTH 12.3 %       11.6-16.2    N            



             (test code = RDW)                                        

 

             RED CELL DISTRIBUTION WIDTH SD 39.8 fL      39.2-49.5    N         

   



             (test code = RDW-SD)                                        

 

             PLATELET COUNT (test code = 178 K/mm3    150-450      N            



             PLT)                                                

 

             MEAN PLATELET VOLUME (test code 10.6 fL      6.7-11.0     N        

    



             = MPV)                                              

 

             NEUTROPHIL % (test code = NT%) 53.9 %       39.0-69.0    N         

   

 

             LYMPHOCYTE % (test code = LY%) 33.6 %       25.0-55.0    N         

   

 

             MONOCYTE % (test code = MO%) 7.8 %        0.0-10.0     N           

 

 

             EOSINOPHIL % (test code = EO%) 3.9 %        0.0-5.0      N         

   

 

             BASOPHIL % (test code = BA%) 0.8 %        0.0-1.0      N           

 

 

             NEUTROPHIL # (test code = NT#) 4.17 K/mm3   1.8-7.7      N         

   

 

             LYMPHOCYTE # (test code = LY#) 2.60 K/mm3   1.0-5.0      N         

   

 

             MONOCYTE # (test code = MO#) 0.60 K/mm3   0-0.8        N           

 

 

             EOSINOPHIL # (test code = EO#) 0.30 K/mm3   0.0-0.5      N         

   

 

             BASOPHIL # (test code = BA#) 0.06 K/mm3   0.0-0.2      N           

 

 

             MANUAL DIFF REQUIRED (test code NO                                 

    



             = MDIFF)                                            



URINALYSIS [id number] 10:59:00





             Test Item    Value        Reference Range Interpretation Comments

 

             UA COLOR (test code STRAW        YELLOW                    



             = COLU)                                             

 

             UA APPEARANCE (test CLEAR        CLEAR                     



             code = APPU)                                        

 

             UA GLUCOSE DIPSTICK NORMAL mg/dL NEGATIVE                  



             (test code = DGLUU)                                        

 

             UA BILIRUBIN NEGATIVE mg/dL NEGATIVE                  



             DIPSTICK (test code                                        



             = BILU)                                             

 

             UA KETONE DIPSTICK neg mg/dL    NEGATIVE                  



             (test code = KETU)                                        

 

             UA SPECIFIC GRAVITY 1.010        1.001-1.035               



             (test code = SGU)                                        

 

             UA BLOOD DIPSTICK 150 (3+) Gume/uL NEGATIVE     A            



             (test code = INDIO)                                        

 

             UA PH DIPSTICK (test 5.0          5.0-8.0                   



             code = GERRY)                                         

 

             UA PROTEIN DIPSTICK neg mg/dL    Neg-15                    



             (test code = PROU)                                        

 

             UA UROBILINIOGEN norm mg/dL   0.0-0.2                   



             DIPSTICK (test code                                        



             = URO)                                              

 

             UA NITRITE DIPSTICK NEGATIVE     NEGATIVE                  



             (test code = KRISTIN)                                        

 

             UA LEUKOCYTE NEGATIVE uL  NEGATIVE                  



             ESTERASE DIPSTICK                                        



             (test code = LEUU)                                        

 

             UA WBC (test code = 0-5 per HPF  0-5                       IN SOME 

URINARY



             WBCU)                                               TRACT INFECTION

S



                                                                 THERE MAY NOT B

E



                                                                 ENOUGHWBCs IN T

HE



                                                                 URINE TO TRIGGE

R AN



                                                                 AUTOMATIC (REFL

EX)



                                                                 URINECULTURE. A



                                                                 SEPERATE ORDER 

FOR



                                                                 URINE CULTURE I

S



                                                                 RECOMMENDEDIF T

HERE



                                                                 IS STRONG SUPPO

RT



                                                                 FOR A URINARY T

RACT



                                                                 INFECTIONCLINIC

ALLY



                                                                 .

 

             UA RBC (test code = 6-10 per HPF 0-5                       



             RBCU)                                               

 

             UA EPITHELIAL CELLS Rare (0-1/hpf) Few                       



             (test code = EPIU) per HPF                                

 

             UA BACTERIA (test TRACE per HPF NONE                      



             code = BACU)                                        

 

             UA MUCUS (test code FEW per LPF  NONE-FEW                  



             = MUCU)                                             



Urine Source? Clean CatchDRUGS OF ABUSE SCREEN [id number] 10:59:00





             Test Item    Value        Reference Range Interpretation Comments

 

             URN COCAINE (test code = COCAURN)              NEGATIVE            

      

 

             URN CANNABINOIDS (test code =              NEGATIVE                

  



             CANNABURN)                                          

 

             URN AMPHETAMINE (test code = AMPHETURN)              NEGATIVE      

            

 

             URN BARBITURATE (test code = BARBITURN)              NEGATIVE      

            

 

             URN BENZODIAZEPINE (test code =              NEGATIVE              

    



             BENZOURN)                                           

 

             URN OPIATES (test code = OPIATURN)              NEGATIVE           

       

 

             URN PHENCYCLIDINE (PCP) (test code =              NEGATIVE         

         



             PHENCURN)                                           



Urine Source? Clean CatchURINALYSIS [id number] 10:56:00





             Test Item    Value        Reference Range Interpretation Comments

 

             UA COLOR (test code = COLU) STRAW        YELLOW                    

 

             UA APPEARANCE (test code = CLEAR        CLEAR                     



             APPU)                                               

 

             UA GLUCOSE DIPSTICK (test NORMAL mg/dL NEGATIVE                  



             code = DGLUU)                                        

 

             UA BILIRUBIN DIPSTICK (test NEGATIVE mg/dL NEGATIVE                

  



             code = BILU)                                        

 

             UA KETONE DIPSTICK (test code neg mg/dL    NEGATIVE                

  



             = KETU)                                             

 

             UA SPECIFIC GRAVITY (test 1.010        1.001-1.035               



             code = SGU)                                         

 

             UA BLOOD DIPSTICK (test code 150 (3+) Gume/uL NEGATIVE     A        

    



             = INDIO)                                              

 

             UA PH DIPSTICK (test code = 5.0          5.0-8.0                   



             GERYR)                                                

 

             UA PROTEIN DIPSTICK (test neg mg/dL    Neg-15                    



             code = PROU)                                        

 

             UA UROBILINIOGEN DIPSTICK norm mg/dL   0.0-0.2                   



             (test code = URO)                                        

 

             UA NITRITE DIPSTICK (test NEGATIVE     NEGATIVE                  



             code = KRISTIN)                                        

 

             UA LEUKOCYTE ESTERASE NEGATIVE uL  NEGATIVE                  



             DIPSTICK (test code = LEUU)                                        

 

             UA WBC (test code = WBCU)  per HPF     0-5                       



Urine Source? Clean CatchDRUGS OF ABUSE SCREEN [id number] 10:56:00





             Test Item    Value        Reference Range Interpretation Comments

 

             URN COCAINE (test code = COCAURN)              NEGATIVE            

      

 

             URN CANNABINOIDS (test code =              NEGATIVE                

  



             CANNABURN)                                          

 

             URN AMPHETAMINE (test code = AMPHETURN)              NEGATIVE      

            

 

             URN BARBITURATE (test code = BARBITURN)              NEGATIVE      

            

 

             URN BENZODIAZEPINE (test code =              NEGATIVE              

    



             BENZOURN)                                           

 

             URN OPIATES (test code = OPIATURN)              NEGATIVE           

       

 

             URN PHENCYCLIDINE (PCP) (test code =              NEGATIVE         

         



             PHENCURN)                                           



Urine Source? Clean CatchURINALYSIS [id number] 20:23:00





             Test Item    Value        Reference Range Interpretation Comments

 

             UA COLOR (test code LIGHT YELLOW YELLOW                    



             = COLU)                                             

 

             UA APPEARANCE (test HAZY         CLEAR        A            



             code = APPU)                                        

 

             UA GLUCOSE DIPSTICK 50 (Trace)   NEGATIVE     A            



             (test code = DGLUU) mg/dL                                  

 

             UA BILIRUBIN NEGATIVE mg/dL NEGATIVE                  



             DIPSTICK (test code                                        



             = BILU)                                             

 

             UA KETONE DIPSTICK neg mg/dL    NEGATIVE                  



             (test code = KETU)                                        

 

             UA SPECIFIC GRAVITY 1.020        1.001-1.035               



             (test code = SGU)                                        

 

             UA BLOOD DIPSTICK 250 (4+) Gume/uL NEGATIVE     A            



             (test code = INDIO)                                        

 

             UA PH DIPSTICK (test 5.0          5.0-8.0                   



             code = GERRY)                                         

 

             UA PROTEIN DIPSTICK 100 (2+) mg/dL Neg-15       A            



             (test code = PROU)                                        

 

             UA UROBILINIOGEN norm mg/dL   0.0-0.2                   



             DIPSTICK (test code                                        



             = URO)                                              

 

             UA NITRITE DIPSTICK NEGATIVE     NEGATIVE                  



             (test code = KRISTIN)                                        

 

             UA LEUKOCYTE 25 (Trace) uL NEGATIVE     A            



             ESTERASE DIPSTICK                                        



             (test code = LEUU)                                        

 

             UA WBC (test code = 0-5 per HPF  0-5                       IN SOME 

URINARY



             WBCU)                                               TRACT INFECTION

S



                                                                 THERE MAY NOT B

E



                                                                 ENOUGHWBCs IN T

HE



                                                                 URINE TO TRIGGE

R AN



                                                                 AUTOMATIC (REFL

EX)



                                                                 URINECULTURE. A



                                                                 SEPERATE ORDER 

FOR



                                                                 URINE CULTURE I

S



                                                                 RECOMMENDEDIF T

HERE



                                                                 IS STRONG SUPPO

RT



                                                                 FOR A URINARY T

RACT



                                                                 INFECTIONCLINIC

ALLY



                                                                 .

 

             UA RBC (test code = 25-50 per HPF 0-5          A            



             RBCU)                                               

 

             UA EPITHELIAL CELLS Rare (0-1/hpf) Few                       



             (test code = EPIU) per HPF                                

 

             UA BACTERIA (test TRACE per HPF NONE                      



             code = BACU)                                        

 

             UA URIC ACID MANY per LPF NONE         A            



             CRYSTALS (test code                                        



             = URIU)                                             

 

             UA TRIPLE PHOSPHATE MODERATE per NONE         A            



             CRYSTALS (test code LPF                                    



             = TRPHOSU)                                          



Urine Source? Clean CatchURINALYSIS [id number] 20:09:00





             Test Item    Value        Reference Range Interpretation Comments

 

             UA COLOR (test code = COLU) LIGHT YELLOW YELLOW                    

 

             UA APPEARANCE (test code = HAZY         CLEAR        A            



             APPU)                                               

 

             UA GLUCOSE DIPSTICK (test 50 (Trace) mg/dL NEGATIVE     A          

  



             code = DGLUU)                                        

 

             UA BILIRUBIN DIPSTICK (test NEGATIVE mg/dL NEGATIVE                

  



             code = BILU)                                        

 

             UA KETONE DIPSTICK (test neg mg/dL    NEGATIVE                  



             code = KETU)                                        

 

             UA SPECIFIC GRAVITY (test 1.020        1.001-1.035               



             code = SGU)                                         

 

             UA BLOOD DIPSTICK (test code 250 (4+) Gume/uL NEGATIVE     A        

    



             = INDIO)                                              

 

             UA PH DIPSTICK (test code = 5.0          5.0-8.0                   



             GERRY)                                                

 

             UA PROTEIN DIPSTICK (test 100 (2+) mg/dL Neg-15       A            



             code = PROU)                                        

 

             UA UROBILINIOGEN DIPSTICK norm mg/dL   0.0-0.2                   



             (test code = URO)                                        

 

             UA NITRITE DIPSTICK (test NEGATIVE     NEGATIVE                  



             code = KRISTIN)                                        

 

             UA LEUKOCYTE ESTERASE 25 (Trace) uL NEGATIVE     A            



             DIPSTICK (test code = LEUU)                                        

 

             UA WBC (test code = WBCU)  per HPF     0-5                       



Urine Source? Clean CatchURINALYSIS [id number] 13:10:00





             Test Item    Value        Reference Range Interpretation Comments

 

             UA COLOR (test code YELLOW       YELLOW                    



             = COLU)                                             

 

             UA APPEARANCE (test CLEAR        CLEAR                     



             code = APPU)                                        

 

             UA GLUCOSE DIPSTICK NORMAL mg/dL NEGATIVE                  



             (test code = DGLUU)                                        

 

             UA BILIRUBIN NEGATIVE mg/dL NEGATIVE                  



             DIPSTICK (test code                                        



             = BILU)                                             

 

             UA KETONE DIPSTICK neg mg/dL    NEGATIVE                  



             (test code = KETU)                                        

 

             UA SPECIFIC GRAVITY 1.025        1.001-1.035               



             (test code = SGU)                                        

 

             UA BLOOD DIPSTICK 250 (4+) Gume/uL NEGATIVE     A            



             (test code = INDIO)                                        

 

             UA PH DIPSTICK (test 5.0          5.0-8.0                   



             code = GERRY)                                         

 

             UA PROTEIN DIPSTICK 100 (2+) mg/dL Neg-15       A            



             (test code = PROU)                                        

 

             UA UROBILINIOGEN norm mg/dL   0.0-0.2                   



             DIPSTICK (test code                                        



             = URO)                                              

 

             UA NITRITE DIPSTICK NEGATIVE     NEGATIVE                  



             (test code = KRISTIN)                                        

 

             UA LEUKOCYTE 25 (Trace) uL NEGATIVE     A            



             ESTERASE DIPSTICK                                        



             (test code = LEUU)                                        

 

             UA WBC (test code = 0-5 per HPF  0-5                       IN SOME 

URINARY



             WBCU)                                               TRACT INFECTION

S



                                                                 THERE MAY NOT B

E



                                                                 ENOUGHWBCs IN T

HE



                                                                 URINE TO TRIGGE

R AN



                                                                 AUTOMATIC (REFL

EX)



                                                                 URINECULTURE. A



                                                                 SEPERATE ORDER 

FOR



                                                                 URINE CULTURE I

S



                                                                 RECOMMENDEDIF T

HERE



                                                                 IS STRONG SUPPO

RT



                                                                 FOR A URINARY T

RACT



                                                                 INFECTIONCLINIC

ALLY



                                                                 .

 

             UA RBC (test code = 11-20 per HPF 0-5                       



             RBCU)                                               

 

             UA EPITHELIAL CELLS None seen per Few                       



             (test code = EPIU) HPF                                    

 

             UA BACTERIA (test FEW per HPF  NONE                      



             code = BACU)                                        

 

             UA URIC ACID MANY per LPF NONE                      



             CRYSTALS (test code                                        



             = URIU)                                             



URINALYSIS W/O [id number] 13:10:00





             Test Item    Value        Reference Range Interpretation Comments

 

             UA MICROSCOPIC NEEDED? (test code = YES                            

        



             UAMICRO)                                            



URINALYSIS [id number] 13:05:00





             Test Item    Value        Reference Range Interpretation Comments

 

             UA COLOR (test code = COLU) YELLOW       YELLOW                    

 

             UA APPEARANCE (test code = CLEAR        CLEAR                     



             APPU)                                               

 

             UA GLUCOSE DIPSTICK (test NORMAL mg/dL NEGATIVE                  



             code = DGLUU)                                        

 

             UA BILIRUBIN DIPSTICK (test NEGATIVE mg/dL NEGATIVE                

  



             code = BILU)                                        

 

             UA KETONE DIPSTICK (test code neg mg/dL    NEGATIVE                

  



             = KETU)                                             

 

             UA SPECIFIC GRAVITY (test 1.025        1.001-1.035               



             code = SGU)                                         

 

             UA BLOOD DIPSTICK (test code 250 (4+) Gume/uL NEGATIVE     A        

    



             = INDIO)                                              

 

             UA PH DIPSTICK (test code = 5.0          5.0-8.0                   



             GERRY)                                                

 

             UA PROTEIN DIPSTICK (test 100 (2+) mg/dL Neg-15       A            



             code = PROU)                                        

 

             UA UROBILINIOGEN DIPSTICK norm mg/dL   0.0-0.2                   



             (test code = URO)                                        

 

             UA NITRITE DIPSTICK (test NEGATIVE     NEGATIVE                  



             code = KRISTIN)                                        

 

             UA LEUKOCYTE ESTERASE 25 (Trace) uL NEGATIVE     A            



             DIPSTICK (test code = LEUU)                                        

 

             UA WBC (test code = WBCU)  per HPF     0-5                       



URINALYSIS W/O [id number] 13:05:00





             Test Item    Value        Reference Range Interpretation Comments

 

             UA MICROSCOPIC NEEDED? (test code = YES                            

        



             UAMICRO)                                            



URINALYSIS [id number] 13:05:00





             Test Item    Value        Reference Range Interpretation Comments

 

             UA COLOR (test code = COLU) YELLOW       YELLOW                    

 

             UA APPEARANCE (test code = CLEAR        CLEAR                     



             APPU)                                               

 

             UA GLUCOSE DIPSTICK (test NORMAL mg/dL NEGATIVE                  



             code = DGLUU)                                        

 

             UA BILIRUBIN DIPSTICK (test NEGATIVE mg/dL NEGATIVE                

  



             code = BILU)                                        

 

             UA KETONE DIPSTICK (test code neg mg/dL    NEGATIVE                

  



             = KETU)                                             

 

             UA SPECIFIC GRAVITY (test 1.025        1.001-1.035               



             code = SGU)                                         

 

             UA BLOOD DIPSTICK (test code 250 (4+) Gume/uL NEGATIVE     A        

    



             = INDIO)                                              

 

             UA PH DIPSTICK (test code = 5.0          5.0-8.0                   



             GERRY)                                                

 

             UA PROTEIN DIPSTICK (test 100 (2+) mg/dL Neg-15       A            



             code = PROU)                                        

 

             UA UROBILINIOGEN DIPSTICK norm mg/dL   0.0-0.2                   



             (test code = URO)                                        

 

             UA NITRITE DIPSTICK (test NEGATIVE     NEGATIVE                  



             code = KRISTIN)                                        

 

             UA LEUKOCYTE ESTERASE 25 (Trace) uL NEGATIVE     A            



             DIPSTICK (test code = LEUU)                                        

 

             UA WBC (test code = WBCU)  per HPF     0-5                       



URINALYSIS W/O [id number] 13:05:00





             Test Item    Value        Reference Range Interpretation Comments

 

             UA MICROSCOPIC NEEDED? (test code = YES                            

        



             UAMICRO)                                            



[id number] 12:37:00





             Test Item    Value        Reference Range Interpretation Comments

 

             LIPASE (test code = LIP) 214 U/L      128-270      N            



[id number] 12:37:00





             Test Item    Value        Reference Range Interpretation Comments

 

             MAGNESIUM (test code = MAG) 1.9 mg/dL    1.6-2.3      N            



[id number] 12:37:00





             Test Item    Value        Reference Range Interpretation Comments

 

             TROPONIN-I (test code = TROPI) <0.015 ng/mL 0.00-0.056   N         

   



COMPREHENSIVE METABOLIC [id number] 12:30:00





             Test Item    Value        Reference Range Interpretation Comments

 

             SODIUM (test code = 138 mmol/L   135-148      N            



             NA)                                                 

 

             POTASSIUM (test code = 4.1 mmol/L   3.5-5.1      N            



             K)                                                  

 

             CHLORIDE (test code = 100 mmol/L   101-109      L            



             CL)                                                 

 

             CARBON DIOXIDE (test 23.4 mmol/L  21-32        N            



             code = CO2)                                         

 

             ANION GAP (test code = 19 mmol/L    10-20        N            



             GAP)                                                

 

             GLUCOSE (test code = 177 mg/dL           H            



             GLU)                                                

 

             BLOOD UREA NITROGEN 18 mg/dL     3-21         N            



             (test code = BUN)                                        

 

             CREATININE (test code 1.79 mg/dL   0.55-1.3     H            



             = CREAT)                                            

 

             BUN/CREATININE RATIO 10.1         10-20        N            



             (test code = BUN/CREA)                                        

 

             TOTAL PROTEIN (test 8.4 g/dL     6.5-8.4      N            



             code = PROT)                                        

 

             ALBUMIN (test code = 4.2 g/dL     3.4-4.8      N            



             ALB)                                                

 

             GLOBULIN (test code = 4.2 G/DL     1-10         N            



             GLOB)                                               

 

             ALBUMIN/GLOBULIN RATIO 1.0 RATIO    0.75-1.50    N            



             (test code = A/G)                                        

 

             CALCIUM (test code = 8.2 mg/dL    8.4-10.2     L            



             CA)                                                 

 

             BILIRUBIN TOTAL (test 0.40 mg/dL   0.0-1.0      N            



             code = BILT)                                        

 

             SGOT/AST (test code = 52 U/L       6-32         H            



             AST)                                                

 

             SGPT/ALT (test code = 75 U/L       12-78        N            Note: 

Change in



             ALT)                                                REFERENCE RANGE

 due



                                                                 to new reagent



                                                                 method.

 

             ALKALINE PHOSPHATASE 69 U/L              N            



             TOTAL (test code =                                        



             ALKP)                                               



[id number] 12:20:00





             Test Item    Value        Reference Range Interpretation Comments

 

             D-DIMER (test code = DDIMER) < 100 ng/ml  < 600                    

 



- CT ABD PELVIS W/O [id number] 12:16:00  Name: THOM MELISSA [facility]     : 1985 Age/S: 33  / M         
[facility]           Unit #: [id number]     Loc:               
[place] [address]                Phys: Tommie Das MD                  
                           Acct: [id number]  Dis Date:               Status: 
REG ER                                  PHONE #: [phone]     Exam Date: 
2019  1208                     FAX #: [phone]      Reason: L flank
pain, h/o kidney stone                      EXAMS:                              
                CPTCODE:      [id number] CT ABD PELVIS W/O CONT                  
  [address]                    HISTORY: Left flank pain, h/o kidney stone           
   TECHNIQUE:  5mm axial CT images were obtained through the abdomen and       
pelvis without contrast. Sagittal and coronal reformatted images were       
generated. Automated exposure control for dose reduction.                
COMPARISON: 18 FINDINGS:                Mild right basilar dependent 
subsegmental atelectasis. Normal heart       size.               Hepatomegaly 
with diffuse fatty infiltration. Nonenhanced gallbladder,       pancreas, 
spleen, and adrenal glands are unremarkable.               Obstructing 3 mm left
ureteral calculus at the level of the iliac       vessels; mild left 
hydronephrosis and renal parenchymal edema.           Nonenhanced right kidney 
and collecting system unremarkable.               Limited evaluation the GI 
tract without oral contrast. Stomach, small       bowel, appendix, and colon are
unremarkable.               No free air or free fluid. No lymphadenopathy. 
Abdominal aorta is       normal incaliber.               Urinary bladder is 
unremarkable. Seminal vesicles and prostate gland       are unremarkable. No 
pelvic free fluid.               Mild degenerative changes of the spine, 
sacroiliac joints, and hips.                         IMPRESSION:                
   Obstructing 3 mm left ureteral calculus at the level of the iliac         
vessels; mild left hydronephrosis and renal parenchymal edema                   
          ** Electronically Signed by Adwoa Reyes D.O. on 2019 at 1216 **   
                  Reported and signed by: Adwoa Reyes D.O.       PAGE  1          
            Signed Report                    (CONTINUED)   Name: 
THOM MELISSA [facility]     : 
1985 Age/S: 33  / M         [facility]           Unit #: [id number]
[id number]     Loc:               [place] [address]                Phys: 
Tommie Das MD                                       Acct: [id number] 
Dis Date:               Status: REG ER                              PHONE #: 
[phone]     Exam Date: 2019  1208   FAX #: [phone]      Reason: 
L flank pain, h/o kidney stone                      EXAMS:                      
                 CPT CODE:      [id number] CT ABD PELVIS W/O CONT       [address]    
          &lt;Continued&gt;                                       CC: 
Tommie Das MD                     Technologist:Alfreda Muro             
       CTDI:        DLP:        Trnscb Date/Time: 2019 (3486) [id number]  
                    Orig Print D/T: S: 2019 (9573) CTDI:          DLP:    
     PAGE  2                       Signed ReportCBC W/AUTO [id number] 
12:07:00





             Test Item    Value        Reference Range Interpretation Comments

 

             WHITE BLOOD CELL (test code = 8.0 K/mm3    4.5-12.5     N          

  



             WBC)                                                

 

             RED BLOOD CELL (test code = 4.73 mill/mm3 4.0-5.8      N           

 



             RBC)                                                

 

             HEMOGLOBIN (test code = HGB) 14.7 gram/dL 13.0-17.5    N           

 

 

             HEMATOCRIT (test code = HCT) 42.4 %       42.0-52.0    N           

 

 

             MEAN CELL VOLUME (test code = 89.6 fL      80-98        N          

  



             MCV)                                                

 

             MEAN CELL HGB (test code = MCH) 31.1 picogram 27.0-33.0    N       

     

 

             MEAN CELL HGB CONCETRATION 34.7 gram/dL 33.0-36.0    N            



             (test code = MCHC)                                        

 

             RED CELL DISTRIBUTION WIDTH 12.5 %       11.6-16.2    N            



             (test code = RDW)                                        

 

             RED CELL DISTRIBUTION WIDTH SD 40.2 fL      39.2-49.5    N         

   



             (test code = RDW-SD)                                        

 

             PLATELET COUNT (test code = 183 K/mm3    150-450      N            



             PLT)                                                

 

             MEAN PLATELET VOLUME (test code 10.7 fL      6.7-11.0     N        

    



             = MPV)                                              

 

             NEUTROPHIL % (test code = NT%) 55.3 %       39.0-69.0    N         

   

 

             LYMPHOCYTE % (test code = LY%) 30.6 %       25.0-55.0    N         

   

 

             MONOCYTE % (test code = MO%) 10.9 %       0.0-10.0     H           

 

 

             EOSINOPHIL % (test code = EO%) 2.9 %        0.0-5.0      N         

   

 

             BASOPHIL % (test code = BA%) 0.3 %        0.0-1.0      N           

 

 

             NEUTROPHIL # (test code = NT#) 4.41 K/mm3   1.8-7.7      N         

   

 

             LYMPHOCYTE # (test code = LY#) 2.44 K/mm3   1.0-5.0      N         

   

 

             MONOCYTE # (test code = MO#) 0.87 K/mm3   0-0.8        H           

 

 

             EOSINOPHIL # (test code = EO#) 0.23 K/mm3   0.0-0.5      N         

   

 

             BASOPHIL # (test code = BA#) 0.02 K/mm3   0.0-0.2      N           

 

 

             MANUAL DIFF REQUIRED (test code NO                                 

    



             = MDIFF)